# Patient Record
Sex: MALE | Race: WHITE | NOT HISPANIC OR LATINO | Employment: FULL TIME | URBAN - METROPOLITAN AREA
[De-identification: names, ages, dates, MRNs, and addresses within clinical notes are randomized per-mention and may not be internally consistent; named-entity substitution may affect disease eponyms.]

---

## 2017-08-31 ENCOUNTER — APPOINTMENT (OUTPATIENT)
Dept: GENERAL RADIOLOGY | Facility: HOSPITAL | Age: 27
End: 2017-08-31

## 2017-08-31 ENCOUNTER — APPOINTMENT (OUTPATIENT)
Dept: CT IMAGING | Facility: HOSPITAL | Age: 27
End: 2017-08-31

## 2017-08-31 ENCOUNTER — HOSPITAL ENCOUNTER (EMERGENCY)
Facility: HOSPITAL | Age: 27
Discharge: HOME | End: 2017-08-31
Attending: EMERGENCY MEDICINE | Admitting: EMERGENCY MEDICINE

## 2017-08-31 VITALS
DIASTOLIC BLOOD PRESSURE: 69 MMHG | TEMPERATURE: 97.1 F | SYSTOLIC BLOOD PRESSURE: 128 MMHG | WEIGHT: 247 LBS | HEIGHT: 65 IN | HEART RATE: 82 BPM | BODY MASS INDEX: 41.15 KG/M2 | RESPIRATION RATE: 16 BRPM | OXYGEN SATURATION: 96 %

## 2017-08-31 DIAGNOSIS — IMO0001 ELEVATED BLOOD PRESSURE: Primary | ICD-10-CM

## 2017-08-31 LAB
ALBUMIN SERPL-MCNC: 4.5 G/DL (ref 3.2–4.8)
ALBUMIN/GLOB SERPL: 1.4 G/DL (ref 1.5–2.5)
ALP SERPL-CCNC: 87 U/L (ref 25–100)
ALT SERPL W P-5'-P-CCNC: 39 U/L (ref 7–40)
ANION GAP SERPL CALCULATED.3IONS-SCNC: 6 MMOL/L (ref 3–11)
AST SERPL-CCNC: 27 U/L (ref 0–33)
BASOPHILS # BLD AUTO: 0.05 10*3/MM3 (ref 0–0.2)
BASOPHILS NFR BLD AUTO: 0.4 % (ref 0–1)
BILIRUB SERPL-MCNC: 0.4 MG/DL (ref 0.3–1.2)
BUN BLD-MCNC: 18 MG/DL (ref 9–23)
BUN/CREAT SERPL: 22.5 (ref 7–25)
CALCIUM SPEC-SCNC: 9.8 MG/DL (ref 8.7–10.4)
CHLORIDE SERPL-SCNC: 101 MMOL/L (ref 99–109)
CO2 SERPL-SCNC: 29 MMOL/L (ref 20–31)
CREAT BLD-MCNC: 0.8 MG/DL (ref 0.6–1.3)
DEPRECATED RDW RBC AUTO: 38.6 FL (ref 37–54)
EOSINOPHIL # BLD AUTO: 0.35 10*3/MM3 (ref 0–0.3)
EOSINOPHIL NFR BLD AUTO: 3 % (ref 0–3)
ERYTHROCYTE [DISTWIDTH] IN BLOOD BY AUTOMATED COUNT: 12.8 % (ref 11.3–14.5)
GFR SERPL CREATININE-BSD FRML MDRD: 118 ML/MIN/1.73
GLOBULIN UR ELPH-MCNC: 3.2 GM/DL
GLUCOSE BLD-MCNC: 95 MG/DL (ref 70–100)
HCT VFR BLD AUTO: 47.2 % (ref 38.9–50.9)
HGB BLD-MCNC: 16.1 G/DL (ref 13.1–17.5)
IMM GRANULOCYTES # BLD: 0.1 10*3/MM3 (ref 0–0.03)
IMM GRANULOCYTES NFR BLD: 0.9 % (ref 0–0.6)
LYMPHOCYTES # BLD AUTO: 2.69 10*3/MM3 (ref 0.6–4.8)
LYMPHOCYTES NFR BLD AUTO: 23.2 % (ref 24–44)
MCH RBC QN AUTO: 28 PG (ref 27–31)
MCHC RBC AUTO-ENTMCNC: 34.1 G/DL (ref 32–36)
MCV RBC AUTO: 82.2 FL (ref 80–99)
MONOCYTES # BLD AUTO: 0.73 10*3/MM3 (ref 0–1)
MONOCYTES NFR BLD AUTO: 6.3 % (ref 0–12)
NEUTROPHILS # BLD AUTO: 7.66 10*3/MM3 (ref 1.5–8.3)
NEUTROPHILS NFR BLD AUTO: 66.2 % (ref 41–71)
PLATELET # BLD AUTO: 177 10*3/MM3 (ref 150–450)
PMV BLD AUTO: 13.1 FL (ref 6–12)
POTASSIUM BLD-SCNC: 3.7 MMOL/L (ref 3.5–5.5)
PROT SERPL-MCNC: 7.7 G/DL (ref 5.7–8.2)
RBC # BLD AUTO: 5.74 10*6/MM3 (ref 4.2–5.76)
SODIUM BLD-SCNC: 136 MMOL/L (ref 132–146)
TROPONIN I SERPL-MCNC: 0 NG/ML (ref 0–0.07)
WBC NRBC COR # BLD: 11.58 10*3/MM3 (ref 3.5–10.8)

## 2017-08-31 PROCEDURE — 99284 EMERGENCY DEPT VISIT MOD MDM: CPT

## 2017-08-31 PROCEDURE — 36415 COLL VENOUS BLD VENIPUNCTURE: CPT

## 2017-08-31 PROCEDURE — 71020 HC CHEST PA AND LATERAL: CPT

## 2017-08-31 PROCEDURE — 80053 COMPREHEN METABOLIC PANEL: CPT | Performed by: PHYSICIAN ASSISTANT

## 2017-08-31 PROCEDURE — 85025 COMPLETE CBC W/AUTO DIFF WBC: CPT | Performed by: PHYSICIAN ASSISTANT

## 2017-08-31 PROCEDURE — 84484 ASSAY OF TROPONIN QUANT: CPT

## 2017-08-31 PROCEDURE — 93005 ELECTROCARDIOGRAM TRACING: CPT | Performed by: PHYSICIAN ASSISTANT

## 2017-08-31 PROCEDURE — 70450 CT HEAD/BRAIN W/O DYE: CPT

## 2017-08-31 RX ORDER — DULOXETIN HYDROCHLORIDE 60 MG/1
60 CAPSULE, DELAYED RELEASE ORAL DAILY
COMMUNITY
End: 2020-11-06

## 2017-08-31 RX ORDER — QUETIAPINE FUMARATE 200 MG/1
200 TABLET, FILM COATED ORAL NIGHTLY
COMMUNITY

## 2017-08-31 RX ORDER — ATENOLOL 25 MG/1
10 TABLET ORAL DAILY
COMMUNITY
End: 2020-11-06 | Stop reason: SDUPTHER

## 2020-11-06 ENCOUNTER — APPOINTMENT (OUTPATIENT)
Dept: GENERAL RADIOLOGY | Facility: HOSPITAL | Age: 30
End: 2020-11-06

## 2020-11-06 ENCOUNTER — HOSPITAL ENCOUNTER (EMERGENCY)
Facility: HOSPITAL | Age: 30
Discharge: HOME | End: 2020-11-06
Attending: EMERGENCY MEDICINE | Admitting: EMERGENCY MEDICINE

## 2020-11-06 VITALS
SYSTOLIC BLOOD PRESSURE: 152 MMHG | HEIGHT: 67 IN | OXYGEN SATURATION: 94 % | HEART RATE: 96 BPM | RESPIRATION RATE: 18 BRPM | DIASTOLIC BLOOD PRESSURE: 94 MMHG | WEIGHT: 270 LBS | BODY MASS INDEX: 42.38 KG/M2 | TEMPERATURE: 99.1 F

## 2020-11-06 DIAGNOSIS — I10 HYPERTENSION, UNSPECIFIED TYPE: ICD-10-CM

## 2020-11-06 DIAGNOSIS — R07.89 ATYPICAL CHEST PAIN: Primary | ICD-10-CM

## 2020-11-06 LAB
ALBUMIN SERPL-MCNC: 4.9 G/DL (ref 3.5–5.2)
ALBUMIN/GLOB SERPL: 1.8 G/DL
ALP SERPL-CCNC: 92 U/L (ref 39–117)
ALT SERPL W P-5'-P-CCNC: 41 U/L (ref 1–41)
ANION GAP SERPL CALCULATED.3IONS-SCNC: 11 MMOL/L (ref 5–15)
AST SERPL-CCNC: 30 U/L (ref 1–40)
BASOPHILS # BLD AUTO: 0.1 10*3/MM3 (ref 0–0.2)
BASOPHILS NFR BLD AUTO: 0.8 % (ref 0–1.5)
BILIRUB SERPL-MCNC: 0.4 MG/DL (ref 0–1.2)
BUN SERPL-MCNC: 21 MG/DL (ref 6–20)
BUN/CREAT SERPL: 25.9 (ref 7–25)
CALCIUM SPEC-SCNC: 10.1 MG/DL (ref 8.6–10.5)
CHLORIDE SERPL-SCNC: 105 MMOL/L (ref 98–107)
CO2 SERPL-SCNC: 25 MMOL/L (ref 22–29)
CREAT SERPL-MCNC: 0.81 MG/DL (ref 0.76–1.27)
D DIMER PPP FEU-MCNC: <0.27 MCGFEU/ML (ref 0–0.56)
DEPRECATED RDW RBC AUTO: 39.1 FL (ref 37–54)
EOSINOPHIL # BLD AUTO: 0.5 10*3/MM3 (ref 0–0.4)
EOSINOPHIL NFR BLD AUTO: 4 % (ref 0.3–6.2)
ERYTHROCYTE [DISTWIDTH] IN BLOOD BY AUTOMATED COUNT: 13.3 % (ref 12.3–15.4)
GFR SERPL CREATININE-BSD FRML MDRD: 113 ML/MIN/1.73
GLOBULIN UR ELPH-MCNC: 2.7 GM/DL
GLUCOSE SERPL-MCNC: 100 MG/DL (ref 65–99)
HCT VFR BLD AUTO: 50 % (ref 37.5–51)
HGB BLD-MCNC: 16.2 G/DL (ref 13–17.7)
HOLD SPECIMEN: NORMAL
HOLD SPECIMEN: NORMAL
IMM GRANULOCYTES # BLD AUTO: 0.15 10*3/MM3 (ref 0–0.05)
IMM GRANULOCYTES NFR BLD AUTO: 1.2 % (ref 0–0.5)
LYMPHOCYTES # BLD AUTO: 2.38 10*3/MM3 (ref 0.7–3.1)
LYMPHOCYTES NFR BLD AUTO: 19 % (ref 19.6–45.3)
MCH RBC QN AUTO: 26.9 PG (ref 26.6–33)
MCHC RBC AUTO-ENTMCNC: 32.4 G/DL (ref 31.5–35.7)
MCV RBC AUTO: 82.9 FL (ref 79–97)
MONOCYTES # BLD AUTO: 0.91 10*3/MM3 (ref 0.1–0.9)
MONOCYTES NFR BLD AUTO: 7.3 % (ref 5–12)
NEUTROPHILS NFR BLD AUTO: 67.7 % (ref 42.7–76)
NEUTROPHILS NFR BLD AUTO: 8.47 10*3/MM3 (ref 1.7–7)
NRBC BLD AUTO-RTO: 0 /100 WBC (ref 0–0.2)
PLATELET # BLD AUTO: 207 10*3/MM3 (ref 140–450)
PMV BLD AUTO: 12.3 FL (ref 6–12)
POTASSIUM SERPL-SCNC: 4.3 MMOL/L (ref 3.5–5.2)
PROT SERPL-MCNC: 7.6 G/DL (ref 6–8.5)
RBC # BLD AUTO: 6.03 10*6/MM3 (ref 4.14–5.8)
SODIUM SERPL-SCNC: 141 MMOL/L (ref 136–145)
TROPONIN T SERPL-MCNC: <0.01 NG/ML (ref 0–0.03)
TROPONIN T SERPL-MCNC: <0.01 NG/ML (ref 0–0.03)
WBC # BLD AUTO: 12.51 10*3/MM3 (ref 3.4–10.8)
WHOLE BLOOD HOLD SPECIMEN: NORMAL
WHOLE BLOOD HOLD SPECIMEN: NORMAL

## 2020-11-06 PROCEDURE — 71045 X-RAY EXAM CHEST 1 VIEW: CPT

## 2020-11-06 PROCEDURE — 85379 FIBRIN DEGRADATION QUANT: CPT | Performed by: PHYSICIAN ASSISTANT

## 2020-11-06 PROCEDURE — 99284 EMERGENCY DEPT VISIT MOD MDM: CPT

## 2020-11-06 PROCEDURE — 93005 ELECTROCARDIOGRAM TRACING: CPT | Performed by: PHYSICIAN ASSISTANT

## 2020-11-06 PROCEDURE — 85025 COMPLETE CBC W/AUTO DIFF WBC: CPT | Performed by: PHYSICIAN ASSISTANT

## 2020-11-06 PROCEDURE — 80053 COMPREHEN METABOLIC PANEL: CPT | Performed by: PHYSICIAN ASSISTANT

## 2020-11-06 PROCEDURE — 84484 ASSAY OF TROPONIN QUANT: CPT | Performed by: PHYSICIAN ASSISTANT

## 2020-11-06 PROCEDURE — 93005 ELECTROCARDIOGRAM TRACING: CPT | Performed by: EMERGENCY MEDICINE

## 2020-11-06 RX ORDER — LURASIDONE HYDROCHLORIDE 40 MG/1
40 TABLET, FILM COATED ORAL DAILY
COMMUNITY
End: 2022-03-15

## 2020-11-06 RX ORDER — ATENOLOL 25 MG/1
25 TABLET ORAL DAILY
Qty: 30 TABLET | Refills: 0 | Status: SHIPPED | OUTPATIENT
Start: 2020-11-06 | End: 2020-12-22 | Stop reason: SDUPTHER

## 2020-11-06 RX ORDER — SODIUM CHLORIDE 0.9 % (FLUSH) 0.9 %
10 SYRINGE (ML) INJECTION AS NEEDED
Status: DISCONTINUED | OUTPATIENT
Start: 2020-11-06 | End: 2020-11-06 | Stop reason: HOSPADM

## 2020-11-06 RX ORDER — ASPIRIN 81 MG/1
324 TABLET, CHEWABLE ORAL ONCE
Status: DISCONTINUED | OUTPATIENT
Start: 2020-11-06 | End: 2020-11-06

## 2020-11-06 NOTE — ED PROVIDER NOTES
Subjective   Mr. Grey is a 22-year-old male comes to the [facility] today complaining of having chest pain.  Patient reports about an hour before arriving here had a what felt like a muscle spasm the left side of his chest felt like a muscle spasm up and used had a lot of pain.  He did not radiate into his neck or down to his arm.  He denies any shortness of breath no nausea vomiting fevers chills.  States he is not lost smell or taste.  States he has no other complaints.  He had a previous stress test about 5 ago that was negative.  He has a history of hypertension, and a family history as his father had a heart attack in his 30s.  He does not smoke he does not drink does not use recreational drugs.      History provided by:  Patient   used: No    Chest Pain  Pain location:  L chest  Pain quality comment:  Spasm  Pain radiates to:  Does not radiate  Pain severity:  Severe  Onset quality:  Sudden  Timing:  Constant  Progression:  Resolved  Chronicity:  New  Context: not breathing, not drug use, not eating, not intercourse, not lifting, not movement, not raising an arm, not at rest, not stress and not trauma    Relieved by:  Nothing  Worsened by:  Nothing  Ineffective treatments:  None tried  Associated symptoms: no abdominal pain, no AICD problem, no anxiety, no back pain, no claudication, no cough, no diaphoresis, no dysphagia, no fatigue, no headache, no heartburn, no lower extremity edema, no nausea, no near-syncope, no orthopnea, no palpitations, no shortness of breath, no syncope, no vomiting and no weakness    Risk factors: hypertension and male sex    Risk factors: no aortic disease, no coronary artery disease, no diabetes mellitus, no Daily-Danlos syndrome, no immobilization, no Marfan's syndrome, not obese, no prior DVT/PE, no smoking and no surgery        Review of Systems   Constitutional: Negative for diaphoresis and fatigue.   HENT: Negative for trouble swallowing.    Respiratory:  Negative for cough and shortness of breath.    Cardiovascular: Positive for chest pain. Negative for palpitations, orthopnea, claudication, syncope and near-syncope.   Gastrointestinal: Negative for abdominal pain, heartburn, nausea and vomiting.   Musculoskeletal: Negative for back pain.   Neurological: Negative for weakness and headaches.   All other systems reviewed and are negative.      Past Medical History:   Diagnosis Date   • Bipolar 1 disorder (CMS/HCC)    • Hypertension    • Schizophrenia, schizo-affective (CMS/HCC)        Allergies   Allergen Reactions   • Albuterol Palpitations and Other (See Comments)       Past Surgical History:   Procedure Laterality Date   • EAR TUBES     • EXTERNAL FIXATION WRIST FRACTURE     • MOUTH SURGERY         History reviewed. No pertinent family history.    Social History     Socioeconomic History   • Marital status: Single     Spouse name: Not on file   • Number of children: Not on file   • Years of education: Not on file   • Highest education level: Not on file   Tobacco Use   • Smoking status: Never Smoker   • Smokeless tobacco: Never Used   Substance and Sexual Activity   • Alcohol use: No   • Drug use: No   • Sexual activity: Defer           Objective   Physical Exam  Vitals signs and nursing note reviewed.   Constitutional:       Appearance: He is well-developed.      Comments: Warm pink dry nontoxic afebrile   HENT:      Head: Normocephalic and atraumatic.      Right Ear: External ear normal.      Left Ear: External ear normal.      Nose: Nose normal.   Eyes:      General: No scleral icterus.     Conjunctiva/sclera: Conjunctivae normal.      Pupils: Pupils are equal, round, and reactive to light.   Neck:      Musculoskeletal: Normal range of motion.      Thyroid: No thyromegaly.   Cardiovascular:      Rate and Rhythm: Normal rate and regular rhythm.      Heart sounds: Normal heart sounds.      Comments: Chest wall is tender to palpation there is no rash no lesions no  crepitus.  Pulmonary:      Effort: Pulmonary effort is normal. No respiratory distress.      Breath sounds: Normal breath sounds. No wheezing or rales.   Chest:      Chest wall: No tenderness.   Abdominal:      General: Bowel sounds are normal. There is no distension.      Palpations: Abdomen is soft.      Tenderness: There is no abdominal tenderness.   Musculoskeletal: Normal range of motion.   Lymphadenopathy:      Cervical: No cervical adenopathy.   Skin:     General: Skin is warm and dry.   Neurological:      Mental Status: He is alert and oriented to person, place, and time.      Cranial Nerves: No cranial nerve deficit.      Coordination: Coordination normal.      Deep Tendon Reflexes: Reflexes are normal and symmetric. Reflexes normal.   Psychiatric:         Behavior: Behavior normal.         Thought Content: Thought content normal.         Judgment: Judgment normal.         Procedures           ED Course                                   Recent Results (from the past 24 hour(s))   Troponin    Collection Time: 11/06/20  2:37 PM    Specimen: Blood   Result Value Ref Range    Troponin T <0.010 0.000 - 0.030 ng/mL     Note: In addition to lab results from this visit, the labs listed above may include labs taken at another facility or during a different encounter within the last 24 hours. Please correlate lab times with ED admission and discharge times for further clarification of the services performed during this visit.    XR Chest 1 View   Final Result   No acute cardiopulmonary disease.       D:  11/06/2020   E:  11/06/2020       This report was finalized on 11/6/2020 3:18 PM by Dr. Rohini King MD.            Vitals:    11/06/20 1330 11/06/20 1400 11/06/20 1415 11/06/20 1517   BP: 160/92 152/94     BP Location:       Patient Position:       Pulse: 102 105 103 96   Resp:       Temp:       TempSrc:       SpO2: 93% 95% 95% 94%   Weight:       Height:         Medications - No data to display  ECG/EMG  Results (last 24 hours)     Procedure Component Value Units Date/Time    ECG 12 Lead [[id number]] Collected: 11/06/20 1209     Updated: 11/06/20 1232    ECG 12 Lead [[id number]] Collected: 11/06/20 1422     Updated: 11/06/20 1430        ECG 12 Lead         ECG 12 Lead         ECG 12 Lead    (Results Pending)     Discussed the patient's findings.  Also discussed the follow-up and likely this is costochondritis with his pain being reproducible with palpation.  We discussed his laboratory data as well as x-rays.  He verbalized understanding of these.  Recent Results (from the past 24 hour(s))   Troponin    Collection Time: 11/06/20  2:37 PM    Specimen: Blood   Result Value Ref Range    Troponin T <0.010 0.000 - 0.030 ng/mL     Note: In addition to lab results from this visit, the labs listed above may include labs taken at another facility or during a different encounter within the last 24 hours. Please correlate lab times with ED admission and discharge times for further clarification of the services performed during this visit.    XR Chest 1 View   Final Result   No acute cardiopulmonary disease.       D:  11/06/2020   E:  11/06/2020       This report was finalized on 11/6/2020 3:18 PM by Dr. Rohini King MD.            Vitals:    11/06/20 1330 11/06/20 1400 11/06/20 1415 11/06/20 1517   BP: 160/92 152/94     BP Location:       Patient Position:       Pulse: 102 105 103 96   Resp:       Temp:       TempSrc:       SpO2: 93% 95% 95% 94%   Weight:       Height:         Medications - No data to display  ECG/EMG Results (last 24 hours)     Procedure Component Value Units Date/Time    ECG 12 Lead [[id number]] Collected: 11/06/20 1209     Updated: 11/06/20 1232    ECG 12 Lead [[id number]] Collected: 11/06/20 1422     Updated: 11/06/20 1430        ECG 12 Lead         ECG 12 Lead         ECG 12 Lead    (Results Pending)               HEART Score (for prediction of 6-week risk of major adverse cardiac event) reviewed  and/or performed as part of the patient evaluation and treatment planning process.  The result associated with this review/performance is: 1       MDM  Number of Diagnoses or Management Options  Atypical chest pain: new and requires workup  Hypertension, unspecified type: new and requires workup     Amount and/or Complexity of Data Reviewed  Clinical lab tests: reviewed and ordered  Tests in the radiology section of CPT®: reviewed and ordered  Tests in the medicine section of CPT®: ordered and reviewed  Discuss the patient with other providers: yes    Patient Progress  Patient progress: stable      Final diagnoses:   Atypical chest pain   Hypertension, unspecified type            Ant Byers PA  11/07/20 1110       Ant Byers PA  11/07/20 1221

## 2020-11-09 LAB
QT INTERVAL: 366 MS
QT INTERVAL: 380 MS
QTC INTERVAL: 469 MS
QTC INTERVAL: 482 MS

## 2020-11-10 ENCOUNTER — OFFICE VISIT (OUTPATIENT)
Dept: CARDIOLOGY | Facility: HOSPITAL | Age: 30
End: 2020-11-10

## 2020-11-10 VITALS
WEIGHT: 275.5 LBS | BODY MASS INDEX: 43.24 KG/M2 | DIASTOLIC BLOOD PRESSURE: 75 MMHG | HEART RATE: 82 BPM | TEMPERATURE: 98.2 F | RESPIRATION RATE: 22 BRPM | OXYGEN SATURATION: 97 % | HEIGHT: 67 IN | SYSTOLIC BLOOD PRESSURE: 127 MMHG

## 2020-11-10 DIAGNOSIS — R07.89 CHEST PAIN, ATYPICAL: Primary | ICD-10-CM

## 2020-11-10 DIAGNOSIS — I10 ESSENTIAL HYPERTENSION: ICD-10-CM

## 2020-11-10 DIAGNOSIS — Z82.49 FAMILY HISTORY OF PREMATURE CAD: ICD-10-CM

## 2020-11-10 DIAGNOSIS — R00.2 PALPITATIONS: ICD-10-CM

## 2020-11-10 PROCEDURE — 99214 OFFICE O/P EST MOD 30 MIN: CPT | Performed by: NURSE PRACTITIONER

## 2020-11-10 RX ORDER — ATOMOXETINE 80 MG/1
80 CAPSULE ORAL DAILY
COMMUNITY
Start: 2020-11-09

## 2020-11-10 NOTE — PROGRESS NOTES
[facility]  [facility]      Encounter Date:11/10/2020     Gregoriobeto ALBA Que  [address] [place] [address]  [unfilled]    1990    Sam Shepard MD    Stef Grey is a 30 y.o. male.      Subjective:     Chief Complaint:  Chest Pain (ED follow up.)       HPI     30-year-old male presented to [facility] ED on 11/6/2020 with complaints of left-sided chest pain.  At times can radiate to left arm.  Can occur 1-2 times per week.  Noted at rest and exertion.  Duration, seconds to minutes.    Mild worsening dyspnea  Occasional palpitations.  Worse  over the last 3 months.   Denies nausea, vomiting, diarrhea, abdominal pain, diaphoresis.  Denies  near syncope, syncope, palpitations.  Patient had a previous stress test reported several years ago ([facility]).  Hx of a heart murmur.  History of hypertension.  Reports history of father having a heart attack in his 30s.    There are no active problems to display for this patient.          Past Surgical History:   Procedure Laterality Date   • EAR TUBES     • EXTERNAL FIXATION WRIST FRACTURE      x2   • MOUTH SURGERY         Allergies   Allergen Reactions   • Albuterol Palpitations and Other (See Comments)         Current Outpatient Medications:   •  Acetaminophen 325 MG capsule, Take 650 mg by mouth As Needed., Disp: , Rfl:   •  atenolol (TENORMIN) 25 MG tablet, Take 1 tablet by mouth Daily., Disp: 30 tablet, Rfl: 0  •  atomoxetine (STRATTERA) 80 MG capsule, Take 80 mg by mouth Daily., Disp: , Rfl:   •  lurasidone (LATUDA) 40 MG tablet tablet, Take 40 mg by mouth Daily., Disp: , Rfl:   •  QUEtiapine (SEROQUEL) 200 MG tablet, Take 200 mg by mouth Every Night., Disp: , Rfl:     The following portions of the patient's history were reviewed and updated today during office visit as appropriate: allergies, current medications, past family history, past medical history, past social history, past surgical history and  "problem list.    Review of Systems   Cardiovascular: Positive for chest pain and palpitations.   Respiratory: Positive for shortness of breath.    All other systems reviewed and are negative.      Objective:     Vitals:    11/10/20 1402 11/10/20 1407 11/10/20 1408   BP: 130/72 132/79 127/75   BP Location: Right arm Left arm Left arm   Patient Position: Sitting Standing Sitting   Cuff Size: Large Adult Large Adult Large Adult   Pulse: 82 87 82   Resp:   22   Temp:   98.2 °F (36.8 °C)   TempSrc:   Temporal   SpO2: 97% 97% 97%   Weight:   125 kg (275 lb 8 oz)   Height:   170.2 cm (67\")     Body mass index is 43.15 kg/m².      Vitals signs reviewed.   Constitutional:       General: Not in acute distress.     Appearance: Well-developed and not in distress. Morbidly obese.   Eyes:      General: No scleral icterus.     Conjunctiva/sclera: Conjunctivae normal.   HENT:      Head: Normocephalic and atraumatic.   Neck:      Thyroid: No thyromegaly.      Vascular: No carotid bruit, hepatojugular reflux or JVD.      Trachea: No tracheal deviation.      Lymphadenopathy: No cervical adenopathy.   Pulmonary:      Effort: Pulmonary effort is normal.      Breath sounds: Normal breath sounds.   Cardiovascular:      Normal rate. Regular rhythm.   Pulses:     Intact distal pulses.   Edema:     Peripheral edema absent.   Abdominal:      General: Bowel sounds are normal. There is no distension.      Palpations: Abdomen is soft.      Tenderness: There is no abdominal tenderness.   Skin:     General: Skin is warm and dry. There is no cyanosis.      Coloration: Skin is not pale.      Findings: No erythema or rash.   Neurological:      Mental Status: Alert, oriented to person, place, and time and oriented to person, place and time.   Psychiatric:         Behavior: Behavior normal.         Thought Content: Thought content normal.         Lab and Diagnostic Review:  Lab Results   Component Value Date    WBC 12.51 (H) 11/06/2020    HGB 16.2 " 11/06/2020    HCT 50.0 11/06/2020    MCV 82.9 11/06/2020     11/06/2020     Lab Results   Component Value Date    GLUCOSE 100 (H) 11/06/2020    CALCIUM 10.1 11/06/2020     11/06/2020    K 4.3 11/06/2020    CO2 25.0 11/06/2020     11/06/2020    BUN 21 (H) 11/06/2020    CREATININE 0.81 11/06/2020    EGFRIFNONA 113 11/06/2020    BCR 25.9 (H) 11/06/2020    ANIONGAP 11.0 11/06/2020       Assessment and Plan:         1. Chest pain, atypical  Obesity, HTN, family hx of premature CAD    - Treadmill Stress Test; Future    2. Palpitations  May need holter if s/s continue  Currently on BB    3.  Family hx of premature CAD.   Reported father having MI 30's.     4.  HTN  On Attenolol    F/u 6 weeks or sooner if needed.        *Please note that portions of this note were completed with a voice recognition program. Efforts were made to edit the dictations, but occasionally words are mistranscribed.

## 2020-11-13 ENCOUNTER — APPOINTMENT (OUTPATIENT)
Dept: PREADMISSION TESTING | Facility: HOSPITAL | Age: 30
End: 2020-11-13

## 2020-11-13 PROCEDURE — C9803 HOPD COVID-19 SPEC COLLECT: HCPCS

## 2020-11-13 PROCEDURE — U0004 COV-19 TEST NON-CDC HGH THRU: HCPCS

## 2020-11-14 LAB — SARS-COV-2 RNA RESP QL NAA+PROBE: NOT DETECTED

## 2020-11-16 ENCOUNTER — HOSPITAL ENCOUNTER (OUTPATIENT)
Dept: CARDIOLOGY | Facility: HOSPITAL | Age: 30
Discharge: HOME | End: 2020-11-16
Admitting: NURSE PRACTITIONER

## 2020-11-16 VITALS
BODY MASS INDEX: 43.16 KG/M2 | HEART RATE: 90 BPM | DIASTOLIC BLOOD PRESSURE: 82 MMHG | HEIGHT: 67 IN | OXYGEN SATURATION: 97 % | SYSTOLIC BLOOD PRESSURE: 138 MMHG | WEIGHT: 275 LBS

## 2020-11-16 DIAGNOSIS — R07.89 CHEST PAIN, ATYPICAL: ICD-10-CM

## 2020-11-16 LAB
BH CV STRESS BP STAGE 1: NORMAL
BH CV STRESS BP STAGE 2: NORMAL
BH CV STRESS DURATION MIN STAGE 1: 3
BH CV STRESS DURATION MIN STAGE 2: 3
BH CV STRESS DURATION SEC STAGE 1: 0
BH CV STRESS DURATION SEC STAGE 2: 0
BH CV STRESS DURATION SEC STAGE 3: 22
BH CV STRESS GRADE STAGE 1: 10
BH CV STRESS GRADE STAGE 2: 12
BH CV STRESS GRADE STAGE 3: 14
BH CV STRESS HR STAGE 1: 136
BH CV STRESS HR STAGE 2: 166
BH CV STRESS HR STAGE 3: 166
BH CV STRESS METS STAGE 1: 5
BH CV STRESS METS STAGE 2: 7.5
BH CV STRESS METS STAGE 3: 10
BH CV STRESS O2 STAGE 1: 96
BH CV STRESS O2 STAGE 2: 96
BH CV STRESS O2 STAGE 3: 97
BH CV STRESS PROTOCOL 1: NORMAL
BH CV STRESS RECOVERY BP: NORMAL MMHG
BH CV STRESS RECOVERY HR: 107 BPM
BH CV STRESS RECOVERY O2: 97 %
BH CV STRESS SPEED STAGE 1: 1.7
BH CV STRESS SPEED STAGE 2: 2.5
BH CV STRESS SPEED STAGE 3: 3.4
BH CV STRESS STAGE 1: 1
BH CV STRESS STAGE 2: 2
BH CV STRESS STAGE 3: 3
MAXIMAL PREDICTED HEART RATE: 192 BPM
PERCENT MAX PREDICTED HR: 98.96 %
STRESS BASELINE BP: NORMAL MMHG
STRESS BASELINE HR: 90 BPM
STRESS O2 SAT REST: 97 %
STRESS PERCENT HR: 116 %
STRESS POST EXERCISE DUR MIN: 6 MIN
STRESS POST EXERCISE DUR SEC: 22 SEC
STRESS POST O2 SAT PEAK: 97 %
STRESS POST PEAK BP: NORMAL MMHG
STRESS POST PEAK HR: 190 BPM
STRESS TARGET HR: 163 BPM

## 2020-11-16 PROCEDURE — 93018 CV STRESS TEST I&R ONLY: CPT | Performed by: INTERNAL MEDICINE

## 2020-11-16 PROCEDURE — 93017 CV STRESS TEST TRACING ONLY: CPT

## 2020-11-17 ENCOUNTER — TELEPHONE (OUTPATIENT)
Dept: CARDIOLOGY | Facility: HOSPITAL | Age: 30
End: 2020-11-17

## 2020-11-17 NOTE — TELEPHONE ENCOUNTER
Attempted to call patient to review treadmill stress test results.Unable to reach patient. Will try at a later time.

## 2020-11-18 ENCOUNTER — TELEPHONE (OUTPATIENT)
Dept: CARDIOLOGY | Facility: HOSPITAL | Age: 30
End: 2020-11-18

## 2020-11-18 NOTE — TELEPHONE ENCOUNTER
Reviewed stress test with patient. Keep scheduled appointment in [facility] December 2020.

## 2020-11-19 PROBLEM — R07.9 CHEST PAIN: Status: ACTIVE | Noted: 2020-11-19

## 2020-12-22 ENCOUNTER — TELEMEDICINE (OUTPATIENT)
Dept: CARDIOLOGY | Facility: HOSPITAL | Age: 30
End: 2020-12-22

## 2020-12-22 VITALS
SYSTOLIC BLOOD PRESSURE: 133 MMHG | BODY MASS INDEX: 42.38 KG/M2 | HEART RATE: 88 BPM | DIASTOLIC BLOOD PRESSURE: 97 MMHG | HEIGHT: 67 IN | WEIGHT: 270 LBS

## 2020-12-22 DIAGNOSIS — R07.89 CHEST PAIN, ATYPICAL: Primary | ICD-10-CM

## 2020-12-22 DIAGNOSIS — I10 ESSENTIAL HYPERTENSION: ICD-10-CM

## 2020-12-22 DIAGNOSIS — R00.2 PALPITATIONS: ICD-10-CM

## 2020-12-22 PROCEDURE — 99214 OFFICE O/P EST MOD 30 MIN: CPT | Performed by: NURSE PRACTITIONER

## 2020-12-22 RX ORDER — ATENOLOL 25 MG/1
25 TABLET ORAL 2 TIMES DAILY
Qty: 60 TABLET | Refills: 3 | Status: SHIPPED | OUTPATIENT
Start: 2020-12-22 | End: 2021-03-31 | Stop reason: SDUPTHER

## 2020-12-22 NOTE — PROGRESS NOTES
[facility]  Heart and Valve Center      Encounter Date:12/22/2020     Stef Grey  [address] [place] [address]  [unfilled]    1990    Sam Shepard MD    Stef Grey is a 30 y.o. male.    This was an audio and video enabled telemedicine encounter.    Subjective:     Chief Complaint:  Chest Pain, Palpitations, and Follow-up       HPI     30-year-old male follow-up on chest discomfort and palpitations.  Treadmill stress test completed on 11/16/2020: No evidence of ischemia.  History of obesity, hypertension, family history of premature CAD.  Reported father having MI in his 30s.  Hypertension managed with atenolol.    Pt reports chest discomfort intermittent, but improved on atenolol.  NO dyspnea, dizziness, syncope, edema.  NO fatigue on BB.  Home log 130-140/90's, no HA, vision changes, TIA/CVA like s/s.     Patient Active Problem List    Diagnosis Date Noted   • Chest pain 11/19/2020     Note Last Updated: 11/19/2020     · Treadmill stress test 11/16/2020: No evidence of ischemia.  Significant exercise impairment.           Past Surgical History:   Procedure Laterality Date   • EAR TUBES     • EXTERNAL FIXATION WRIST FRACTURE      x2   • MOUTH SURGERY         Allergies   Allergen Reactions   • Albuterol Palpitations and Other (See Comments)         Current Outpatient Medications:   •  Acetaminophen 325 MG capsule, Take 650 mg by mouth As Needed., Disp: , Rfl:   •  atenolol (TENORMIN) 25 MG tablet, Take 1 tablet by mouth 2 (two) times a day., Disp: 60 tablet, Rfl: 3  •  atomoxetine (STRATTERA) 80 MG capsule, Take 80 mg by mouth Daily., Disp: , Rfl:   •  QUEtiapine (SEROQUEL) 200 MG tablet, Take 200 mg by mouth Every Night., Disp: , Rfl:   •  lurasidone (LATUDA) 40 MG tablet tablet, Take 40 mg by mouth Daily., Disp: , Rfl:     The following portions of the patient's history were reviewed and updated today during office visit as appropriate:  "allergies, current medications, past family history, past medical history, past social history, past surgical history and problem list.    Review of Systems   Cardiovascular: Positive for chest pain and palpitations.   Respiratory: Positive for shortness of breath.    All other systems reviewed and are negative.      Objective:     Vitals:    12/22/20 0805   BP: 133/97   BP Location: Left arm   Patient Position: Sitting   Cuff Size: Adult   Pulse: 88   Weight: 122 kg (270 lb)   Height: 170.2 cm (67\")       Body mass index is 42.29 kg/m².      Vitals signs reviewed.   Constitutional:       Appearance: Not in distress. Morbidly obese.   Pulmonary:      Effort: Pulmonary effort is normal.   Skin:     Coloration: Skin is not pale.   Neurological:      Mental Status: Alert and oriented to person, place and time.   Psychiatric:         Behavior: Behavior is cooperative.         Lab and Diagnostic Review:    Treadmill stress test 11/16/2020: No evidence of ischemia.  Decreased exercise impairment.  No arrhythmias.  Assessment and Plan:         1. Chest pain, atypical  Improved on BB    Acceptable treadmill stress    Increase physical activity and modify diet for weight loss    2. Palpitations  ocassional  No arrythmias on stress  Improved on BB    3. Essential hypertension  Increase atenolol 25 mg BID    Fu 6 weeks, telehealth visit.             *Please note that portions of this note were completed with a voice recognition program. Efforts were made to edit the dictations, but occasionally words are mistranscribed.    "

## 2021-04-01 RX ORDER — ATENOLOL 25 MG/1
25 TABLET ORAL 2 TIMES DAILY
Qty: 60 TABLET | Refills: 3 | Status: SHIPPED | OUTPATIENT
Start: 2021-04-01 | End: 2021-12-08 | Stop reason: SDUPTHER

## 2021-04-01 NOTE — TELEPHONE ENCOUNTER
Pt will need to scheduled a f/u in [facility] for continued management and refills or f/u with PCP

## 2021-12-08 RX ORDER — ATENOLOL 25 MG/1
25 TABLET ORAL 2 TIMES DAILY
Qty: 60 TABLET | Refills: 0 | Status: SHIPPED | OUTPATIENT
Start: 2021-12-08 | End: 2021-12-23

## 2021-12-08 NOTE — TELEPHONE ENCOUNTER
I have given 1 month supply.  Pt will need a f/u office visit in [facility] for continued refills or he may choose to see his PCP for continued management.

## 2021-12-23 RX ORDER — ATENOLOL 25 MG/1
TABLET ORAL
Qty: 60 TABLET | Refills: 0 | Status: SHIPPED | OUTPATIENT
Start: 2021-12-23 | End: 2022-03-15 | Stop reason: SDUPTHER

## 2022-01-28 RX ORDER — ATENOLOL 25 MG/1
25 TABLET ORAL 2 TIMES DAILY
Qty: 60 TABLET | Refills: 0 | OUTPATIENT
Start: 2022-01-28

## 2022-02-24 RX ORDER — ATENOLOL 25 MG/1
TABLET ORAL
Qty: 60 TABLET | Refills: 0 | OUTPATIENT
Start: 2022-02-24

## 2022-02-24 RX ORDER — ATENOLOL 25 MG/1
25 TABLET ORAL 2 TIMES DAILY
Qty: 60 TABLET | Refills: 0 | OUTPATIENT
Start: 2022-02-24

## 2022-03-15 ENCOUNTER — OFFICE VISIT (OUTPATIENT)
Dept: CARDIOLOGY | Facility: HOSPITAL | Age: 32
End: 2022-03-15

## 2022-03-15 ENCOUNTER — HOSPITAL ENCOUNTER (OUTPATIENT)
Dept: CARDIOLOGY | Facility: HOSPITAL | Age: 32
Discharge: HOME | End: 2022-03-15

## 2022-03-15 VITALS
WEIGHT: 270.13 LBS | RESPIRATION RATE: 19 BRPM | OXYGEN SATURATION: 94 % | HEIGHT: 65 IN | HEART RATE: 77 BPM | DIASTOLIC BLOOD PRESSURE: 70 MMHG | SYSTOLIC BLOOD PRESSURE: 125 MMHG | TEMPERATURE: 98.3 F | BODY MASS INDEX: 45.01 KG/M2

## 2022-03-15 DIAGNOSIS — I10 ESSENTIAL HYPERTENSION: ICD-10-CM

## 2022-03-15 DIAGNOSIS — R00.2 PALPITATIONS: ICD-10-CM

## 2022-03-15 DIAGNOSIS — R07.89 CHEST PRESSURE: ICD-10-CM

## 2022-03-15 DIAGNOSIS — R06.83 SNORING: ICD-10-CM

## 2022-03-15 PROCEDURE — 99214 OFFICE O/P EST MOD 30 MIN: CPT | Performed by: NURSE PRACTITIONER

## 2022-03-15 PROCEDURE — 93246 EXT ECG>7D<15D RECORDING: CPT

## 2022-03-15 RX ORDER — ATENOLOL 25 MG/1
25 TABLET ORAL 2 TIMES DAILY
Qty: 60 TABLET | Refills: 6 | Status: SHIPPED | OUTPATIENT
Start: 2022-03-15

## 2022-03-15 NOTE — PROGRESS NOTES
"[facility] Heart [facility] Vascular    Chief Complaint  Hypertension    Subjective    History of Present Illness {CC  Problem List  Visit  Diagnosis   Encounters  Notes  Medications  Labs  Result Review Imaging  Media :23}     Stef Grey presents to [facility] CARDIOLOGY for   History of Present Illness       32-year-old male with history of palpitations, chest discomfort.  Treadmill stress test completed 11/16/2020: No ischemia.  History of obesity, hypertension, family history of premature CAD reporting father having MIs in his 30s.    Last seen in the heart valve center on 12/22/2020.  At that time atenolol has been increased to twice a day dosing.  Patient's palpitations and blood pressure had been better controlled.  Patient presents today for follow-up of blood pressure.    Pt reports waking from sleep with chest pain.  Pt went to ED ([facility]).  Patient reports CP improved with NTG.  BP was elevated which he reports improved.  NO further CP.  NO change in dyspnea, dizziness, syncope.  Pt reports mild intermittent palpitations.      Reports a hx of snoring.  Daytime fatigue felt related to Seroquel.      No acid reflux.        Objective     Vital Signs:   Vitals:    03/15/22 1224   BP: 125/70   BP Location: Left arm   Patient Position: Sitting   Cuff Size: Adult   Pulse: 77   Resp: 19   Temp: 98.3 °F (36.8 °C)   TempSrc: Temporal   SpO2: 94%   Weight: 123 kg (270 lb 2 oz)   Height: 165.1 cm (65\")     Body mass index is 44.95 kg/m².  Physical Exam  Vitals reviewed.   Constitutional:       General: He is not in acute distress.     Appearance: Normal appearance. He is morbidly obese.   Cardiovascular:      Rate and Rhythm: Normal rate and regular rhythm.      Pulses:           Radial pulses are 2+ on the right side.        Dorsalis pedis pulses are 2+ on the right side.        Posterior tibial pulses are 2+ on the right side.      Heart sounds: " Normal heart sounds.   Pulmonary:      Effort: Pulmonary effort is normal.      Breath sounds: Normal breath sounds.   Abdominal:      Palpations: Abdomen is soft.      Tenderness: There is no abdominal tenderness.   Musculoskeletal:      Right lower leg: No edema.      Left lower leg: No edema.   Skin:     General: Skin is warm and dry.   Neurological:      Mental Status: He is alert.   Psychiatric:         Mood and Affect: Mood normal.         Behavior: Behavior is cooperative.              Result Review  Data Reviewed:{ Labs  Result Review  Imaging  Med Tab  Media :23}   · Treadmill stress test 11/16/2020: No evidence of ischemia.  Significant exercise impairment.      Assessment and Plan {CC Problem List  Visit Diagnosis  ROS  Review (Popup)  Health Maintenance  Quality  BestPractice  Medications  SmartSets  SnapShot Encounters  Media :23}   1. Essential hypertension  Controlled today  Refill:  - atenolol (TENORMIN) 25 MG tablet; Take 1 tablet by mouth 2 (Two) Times a Day.  Dispense: 60 tablet; Refill: 6    2. Palpitations    - atenolol (TENORMIN) 25 MG tablet; Take 1 tablet by mouth 2 (Two) Times a Day.  Dispense: 60 tablet; Refill: 6  - Holter Monitor - 72 Hour Up To 15 Days; Future  - Ambulatory Referral to Sleep Medicine    3. Snoring  Body mass index is 44.95 kg/m².    - Ambulatory Referral to Sleep Medicine    4. Chest pressure  X1 that woke patient from sleep  Pt at risk for IFEANYI to be scheduled for sleep study  Pt denies exertional CP or pressure  Normal stress 2020  If s/s continue will consider repeat stress or coronary artery CTA.           Follow Up {Instructions Charge Capture  Follow-up Communications :23}   Return in about 6 weeks (around 4/26/2022) for Office visit, monitor results, HTN.    Patient was given instructions and counseling regarding his condition or for health maintenance advice. Please see specific information pulled into the AVS if appropriate.  Patient was  instructed to call the [facility] with any questions, concerns, or worsening symptoms.

## 2022-03-15 NOTE — PROGRESS NOTES
[facility] Heart Monitor Documentation    Stef Grey  1990  [id number]  03/15/22      [] ZIO XT Patch  Model [id number] Prescribed for N/A Days    · Serial Number: (N + 9 Digits) N   · Apply-By Date on Box:   · USPS Tracking Number:   · USPS Tracking        [] [employer] MINI PLUS Mobile Cardiac Telemetry  Model BGMINIPLUS Prescribed for N/A Days    · Serial Number: (BGM + 7 Digits) BGM  · Shipped-By Date on Box:   · UPS Tracking Number: [id number]  · UPS Tracking      [] [employer] MINI Holter Monitor  Model BGMINIEL Prescribed for 14 Days    · Serial Number: (7 Digits) [id number]  · Shipped-By Date on Box: [id number]  · UPS Tracking Number: [id number]  · UPS Tracking        This monitor was applied to the patient's chest and checked for proper functioning.  Mr. Stef Grey was instructed in the proper use of this monitor.  He was given the opportunity to ask questions and left the office with the device 's instruction manual.    Judit Mcdaniel MA, 13:19 EDT, 03/15/22                  [facility] 8.8.2019

## 2022-05-04 ENCOUNTER — TELEMEDICINE (OUTPATIENT)
Dept: CARDIOLOGY | Facility: HOSPITAL | Age: 32
End: 2022-05-04

## 2022-05-04 VITALS
DIASTOLIC BLOOD PRESSURE: 70 MMHG | WEIGHT: 272 LBS | HEIGHT: 65 IN | HEART RATE: 89 BPM | SYSTOLIC BLOOD PRESSURE: 125 MMHG | BODY MASS INDEX: 45.32 KG/M2

## 2022-05-04 DIAGNOSIS — R07.89 CHEST PAIN, ATYPICAL: ICD-10-CM

## 2022-05-04 DIAGNOSIS — R00.2 PALPITATIONS: Primary | ICD-10-CM

## 2022-05-04 DIAGNOSIS — I10 ESSENTIAL HYPERTENSION: ICD-10-CM

## 2022-05-04 DIAGNOSIS — E66.01 MORBID OBESITY WITH BMI OF 40.0-44.9, ADULT: ICD-10-CM

## 2022-05-04 DIAGNOSIS — R06.83 SNORING: ICD-10-CM

## 2022-05-04 PROCEDURE — 99213 OFFICE O/P EST LOW 20 MIN: CPT | Performed by: NURSE PRACTITIONER

## 2022-05-04 NOTE — PROGRESS NOTES
[facility], [facility] Heart and Vascular    This was an audio and video enabled telemedicine encounter.    You have chosen to receive care through the use of telemedicine. Telemedicine enables health care providers at different locations to provide safe, effective, and convenient care through the use of technology. As with any health care service, there are risks associated with the use of telemedicine, including equipment failure, poor connections, and  issues.    • Do you understand the risks and benefits of telemedicine as I have explained them to you? Yes  • Have your questions regarding telemedicine been answered? Yes  • Trouble with heartburn or indigestion do you consent to the use of telemedicine in your medical care today? Yes    Chief Complaint  No chief complaint on file.    Subjective    History of Present Illness {CC  Problem List  Visit  Diagnosis   Encounters  Notes  Medications  Labs  Result Review Imaging  Media :23}     Stef Grey presents to [facility] CARDIOLOGY for   History of Present Illness     32-year-old male with history of palpitation, chest discomfort.  History of morbid obesity, hypertension, family history of premature CAD reporting father had MIs in his 30s    History of treadmill stress test 11/16/2020: No ischemia.    Patient with palpitations.  Currently on atenolol.    Patient with history of snoring.  Had awakened at night with chest pressure while sleeping.  Referred for sleep medicine.       Had discussed his symptoms continued with normal stress test in 2020 to consider coronary artery CTA or repeat stress.    Extended Holter 3/15/2022: Duration 13 days.  Analyzable data 9 days.  Average heart rate 89 bpm, PACs and PVCs less than 1%.  Patient symptoms associated with sinus tachycardia and sinus.  Heart rate controlled 80%, 20% tachycardia.  No concerning arrhythmias.    No further CP or  "pressure having any unusual shortness of breath.  No dizziness, near syncope, syncope.  Occassional palpitations.      Pt reports he is still interested in sleep study but having difficulty contacting office to scheduled.       Objective     Vital Signs:   Vitals:    05/04/22 0939   BP: 125/70   Pulse: 89   Weight: 123 kg (272 lb)   Height: 165.1 cm (65\")     Body mass index is 45.26 kg/m².  Physical Exam  Vitals reviewed.   Constitutional:       General: He is not in acute distress.     Appearance: He is morbidly obese.   Pulmonary:      Effort: Pulmonary effort is normal.   Skin:     Coloration: Skin is not pale.   Neurological:      Mental Status: He is alert.   Psychiatric:         Mood and Affect: Mood normal.         Behavior: Behavior normal. Behavior is cooperative.              Result Review  Data Reviewed:{ Labs  Result Review  Imaging  Med Tab  Media :23}   · Treadmill stress test 11/16/2020: No evidence of ischemia.  Significant exercise impairment.    Extended Holter 3/15/2022: Duration 13 days.  Analyzable data 9 days.  Average heart rate 89 bpm, PACs and PVCs less than 1%.  Patient symptoms associated with sinus tachycardia and sinus.  Heart rate controlled 80%, 20% tachycardia.  No concerning arrhythmias.      Assessment and Plan {CC Problem List  Visit Diagnosis  ROS  Review (Popup)  Health Maintenance  Quality  BestPractice  Medications  SmartSets  SnapShot Encounters  Media :23}   1. Palpitations  Heart monitor acceptable.  No concerning arrhythmias.  Continue atenolol      2. Essential hypertension  Blood pressure has been well controlled on atenolol.  Continue to monitor    3. Chest pain, atypical  Normal stress in 2020.  No further chest discomfort.  Family history of premature CAD.  Continue to monitor at this time.  If symptoms worsen or continue we will discussed repeat stress test versus coronary artery CTA.    4. Snoring  Patient still interested in sleep study.  Unable to " "make contact with sleep center to schedule.  \"Missing calls\"  Heart valve  will attempt to assist patient with scheduling sleep study.  5. Morbid obesity with BMI of 40.0-44.9, adult (HCC)  Body mass index is 45.26 kg/m².  Diet and exercise modifications for weight loss.      I spent 20 minutes caring for Stef on this date of service. This time includes time spent by me in the following activities:preparing for the visit, reviewing tests, performing a medically appropriate examination and/or evaluation , counseling and educating the patient/family/caregiver, documenting information in the medical record and care coordination    Follow Up {Instructions Charge Capture  Follow-up Communications :23}   Return in about 3 months (around 8/4/2022) for Video visit, palpitations, Chest pain.    Patient was given instructions and counseling regarding his condition or for health maintenance advice. Please see specific information pulled into the AVS if appropriate.  Patient was instructed to call the [facility] with any questions, concerns, or worsening symptoms.  "

## 2022-05-19 PROCEDURE — 93248 EXT ECG>7D<15D REV&INTERPJ: CPT | Performed by: INTERNAL MEDICINE

## 2022-06-02 ENCOUNTER — OFFICE VISIT (OUTPATIENT)
Dept: SLEEP MEDICINE | Facility: HOSPITAL | Age: 32
End: 2022-06-02

## 2022-06-02 VITALS
SYSTOLIC BLOOD PRESSURE: 148 MMHG | WEIGHT: 282.8 LBS | BODY MASS INDEX: 47.12 KG/M2 | DIASTOLIC BLOOD PRESSURE: 76 MMHG | HEART RATE: 86 BPM | OXYGEN SATURATION: 97 % | HEIGHT: 65 IN

## 2022-06-02 DIAGNOSIS — E66.01 MORBID (SEVERE) OBESITY DUE TO EXCESS CALORIES: ICD-10-CM

## 2022-06-02 DIAGNOSIS — R06.83 SNORING: Primary | ICD-10-CM

## 2022-06-02 DIAGNOSIS — G47.33 OBSTRUCTIVE SLEEP APNEA, ADULT: ICD-10-CM

## 2022-06-02 PROCEDURE — 99203 OFFICE O/P NEW LOW 30 MIN: CPT | Performed by: INTERNAL MEDICINE

## 2022-06-21 NOTE — PROGRESS NOTES
Chief Complaint  Snoring and possible sleep disordered breathing    Subjective        Stef Grey presents to [facility] SLEEP MEDICINE for the evaluation of snoring and possible sleep disordered breathing. he is referred by Albin SALAS.  His primary care physician is Dr. Shepard.  He is seen in person in the sleep clinic.  History of Present Illness  Patient's had palpitations noted for 5 years.  He has been seen by cardiology and referred for evaluation of possible sleep disordered breathing as a contributing factor.  He says he has had snoring noted since he was a teenager.  He denies any noted apneas.  He has awaken gasping for breath but says is taken to sleep medications.  He says he usually is rested on arising in the morning.  He may have a morning headache but only once or twice during the year.    He denies awakening with a dry mouth.  He denies breaking his nose or having trouble breathing through his nose.  He will occasionally fall asleep with he sitting quietly during the day is very tired.  He denies any problems driving.  He denies kicking or jerking of his legs at night.  He has a history of scoliosis and has occasional back pain.    Goes to bed about 9 PM.  He will fall asleep in 15 to 30 minutes.  He awakens once or twice during the night.  He thinks he gets 8 to 9 hours of sleep and says he usually feels rested.  He has had hypertension known for 13 years.  He denies any history of diabetes.  He is has a history of palpitations.  He denies any history of coronary artery disease.    Past medical history:    Allergies: Albuterol    Habits: Smoking: Without    Ethanol: Without    Caffeine: Without    Medical illnesses: He has a history of palpitations, hypertension, schizophrenia.    Medications: He is on acetaminophen, atenolol, Strattera, Seroquel.    Surgeries: Had wisdom teeth extraction, right arm fracture repair    Family history: Positives include  "diabetes, hypertension, heart disease, stroke, COPD, asthma, cancer.    Review of systems: Positives include dental problems, hearing loss, back pain, hallucinations.  Other systems were reviewed and reported as negative.    Dailey score is 2/24  Objective   Vital Signs:  /76   Pulse 86   Ht 165.1 cm (65\")   Wt 128 kg (282 lb 12.8 oz)   SpO2 97%   BMI 47.06 kg/m²   Estimated body mass index is 47.06 kg/m² as calculated from the following:    Height as of this encounter: 165.1 cm (65\").    Weight as of this encounter: 128 kg (282 lb 12.8 oz).          Physical Exam patient appears to be awake and alert.  He does not appear to be in acute respiratory distress.    He is normocephalic.  He has Mallampati class IV anatomy.    Lungs are clear.    Cardiac exam revealed normal S1-S2.    Extremities showed no edema.  Result Review :         Assessment and Plan   Diagnoses and all orders for this visit:    1. Snoring (Primary)  -     Home Sleep Study; Future    2. Obstructive sleep apnea, adult  -     Home Sleep Study; Future    3. Morbid (severe) obesity due to excess calories (HCC)  -     Ambulatory Referral to Nutrition Services    Patient has a history of snoring and sometimes nonrestorative sleep.  He has a fair history for obstructive sleep apnea.  We will plan to proceed to home sleep testing.  We have discussed potential therapies including CPAP, weight control, oral appliance, and surgery.  We have discussed the consequences of long-term untreated obstructive sleep apnea.  These include hypertension, diabetes, heart disease, stroke, and dementia.  He is encouraged to lose weight.  He is encouraged to avoid alcohol and sedatives close to bedtime.  He is encouraged to practice lateral position sleep.  We will plan to see him back after his study.  He is willing to have a discussion with the dietitian regarding weight loss.       I spent 35 minutes caring for Stef on this date of service. This time " includes time spent by me in the following activities:obtaining and/or reviewing a separately obtained history, performing a medically appropriate examination and/or evaluation , counseling and educating the patient/family/caregiver, ordering medications, tests, or procedures and documenting information in the medical record  Follow Up   Return for Follow up after study, Next scheduled follow-up.  Patient was given instructions and counseling regarding his condition or for health maintenance advice. Please see specific information pulled into the AVS if appropriate.   Ant Dan MD [facility]  Sleep Medicine  Pulmonary and Critical Care Medicine

## 2022-08-10 ENCOUNTER — TELEMEDICINE (OUTPATIENT)
Dept: CARDIOLOGY | Facility: HOSPITAL | Age: 32
End: 2022-08-10

## 2022-08-10 VITALS — DIASTOLIC BLOOD PRESSURE: 74 MMHG | SYSTOLIC BLOOD PRESSURE: 128 MMHG | HEART RATE: 77 BPM

## 2022-08-10 DIAGNOSIS — E66.01 MORBID OBESITY WITH BMI OF 40.0-44.9, ADULT: ICD-10-CM

## 2022-08-10 DIAGNOSIS — R00.2 PALPITATIONS: Primary | ICD-10-CM

## 2022-08-10 DIAGNOSIS — R07.89 CHEST PAIN, ATYPICAL: ICD-10-CM

## 2022-08-10 DIAGNOSIS — R06.83 SNORING: ICD-10-CM

## 2022-08-10 DIAGNOSIS — I10 ESSENTIAL HYPERTENSION: ICD-10-CM

## 2022-08-10 PROCEDURE — 99213 OFFICE O/P EST LOW 20 MIN: CPT | Performed by: NURSE PRACTITIONER

## 2022-08-10 NOTE — PROGRESS NOTES
[facility], [facility] Heart and Vascular    This was an audio and video enabled telemedicine encounter.    You have chosen to receive care through the use of telemedicine. Telemedicine enables health care providers at different locations to provide safe, effective, and convenient care through the use of technology. As with any health care service, there are risks associated with the use of telemedicine, including equipment failure, poor connections, and  issues.    • Do you understand the risks and benefits of telemedicine as I have explained them to you? Yes  • Have your questions regarding telemedicine been answered? Yes  • Do you consent to the use of telemedicine in your medical care today? Yes    Chief Complaint  No chief complaint on file.    Subjective    History of Present Illness {CC  Problem List  Visit  Diagnosis   Encounters  Notes  Medications  Labs  Result Review Imaging  Media :23}     Stef Grey presents to [facility] CARDIOLOGY for   History of Present Illness     32-year-old male with history of palpitations, chest discomfort, morbid obesity, hypertension, atypical chest pain, family history of premature CAD reporting father had MIs in his 30s.    Treadmill stress test completed 11/16/202020: No ischemia    Palpitations currently treated with atenolol.    Referral been completed to sleep medicine for history of snoring and nighttime awakening.    Extended Holter 3/15/2022: Duration 13 days.  Analyzable data 9 days.  Average heart rate 89 bpm, PACs and PVCs less than 1%.  Patient symptoms associated with sinus tachycardia and sinus.  Heart rate controlled 80%, 20% tachycardia.  No concerning arrhythmias    Patient reports he is doing well.  He has been less anxious about his palpitations.  They are fairly well controlled with his beta-blocker.  He has not had any chest pain or pressure, worsening dyspnea,  dizziness, near syncope, syncope.  He is waiting to have a at home sleep study being arranged by the sleep center.  No concerns reported today.      Objective     Vital Signs:   Vitals:    08/10/22 1109   BP: 128/74   Pulse: 77     There is no height or weight on file to calculate BMI.  Physical Exam  Vitals reviewed.   Constitutional:       General: He is not in acute distress.  Pulmonary:      Effort: Pulmonary effort is normal.   Skin:     Coloration: Skin is not pale.   Neurological:      Mental Status: He is alert.   Psychiatric:         Mood and Affect: Mood normal.         Behavior: Behavior normal. Behavior is cooperative.              Result Review  Data Reviewed:{ Labs  Result Review  Imaging  Med Tab  Media :23}   · Treadmill stress test 11/16/2020: No evidence of ischemia.  Significant exercise impairment.     Extended Holter 3/15/2022: Duration 13 days.  Analyzable data 9 days.  Average heart rate 89 bpm, PACs and PVCs less than 1%.  Patient symptoms associated with sinus tachycardia and sinus.  Heart rate controlled 80%, 20% tachycardia.  No concerning arrhythmias              Assessment and Plan {CC Problem List  Visit Diagnosis  ROS  Review (Popup)  Health Maintenance  Quality  BestPractice  Medications  SmartSets  SnapShot Encounters  Media :23}   1. Palpitations  Occasional, fairly well controlled on beta-blocker.  No concerns at this time    2. Essential hypertension  Reported home blood pressure log well-controlled.    3. Chest pain, atypical  No further chest pain, pressure, worsening dyspnea on exertion    4. Snoring  Plans to complete home sleep study being arranged by the sleep medicine    5. Morbid obesity with BMI of 40.0-44.9, adult (HCC)  Continue diet and exercise modifications for weight loss.    There is no height or weight on file to calculate BMI.        I spent 15 minutes caring for Stef on this date of service. This time includes time spent by me in the  following activities:preparing for the visit, reviewing tests, performing a medically appropriate examination and/or evaluation , counseling and educating the patient/family/caregiver and documenting information in the medical record    Follow Up {Instructions Charge Capture  Follow-up Communications :23}   Return if symptoms worsen or fail to improve.    Patient was given instructions and counseling regarding his condition or for health maintenance advice. Please see specific information pulled into the AVS if appropriate.  Patient was instructed to call the [facility] with any questions, concerns, or worsening symptoms.

## 2023-10-02 ENCOUNTER — APPOINTMENT (OUTPATIENT)
Dept: GENERAL RADIOLOGY | Facility: HOSPITAL | Age: 33
End: 2023-10-02
Payer: COMMERCIAL

## 2023-10-02 ENCOUNTER — HOSPITAL ENCOUNTER (OUTPATIENT)
Facility: HOSPITAL | Age: 33
Discharge: NURSING, REHAB OR LONG-TERM CARE | End: 2023-10-08
Attending: EMERGENCY MEDICINE | Admitting: ORTHOPAEDIC SURGERY
Payer: COMMERCIAL

## 2023-10-02 ENCOUNTER — APPOINTMENT (OUTPATIENT)
Dept: CT IMAGING | Facility: HOSPITAL | Age: 33
End: 2023-10-02
Payer: COMMERCIAL

## 2023-10-02 DIAGNOSIS — S80.01XA CONTUSION OF RIGHT KNEE, INITIAL ENCOUNTER: ICD-10-CM

## 2023-10-02 DIAGNOSIS — G47.34 NOCTURNAL HYPOXIA: ICD-10-CM

## 2023-10-02 DIAGNOSIS — S62.102A CLOSED FRACTURE OF LEFT WRIST, INITIAL ENCOUNTER: Primary | ICD-10-CM

## 2023-10-02 DIAGNOSIS — I10 ESSENTIAL HYPERTENSION: ICD-10-CM

## 2023-10-02 DIAGNOSIS — S52.572A INTRA-ARTICULAR FRACTURE OF DISTAL END OF LEFT RADIUS WITH VOLAR ANGULATION, INITIAL ENCOUNTER: ICD-10-CM

## 2023-10-02 DIAGNOSIS — R00.2 PALPITATIONS: ICD-10-CM

## 2023-10-02 DIAGNOSIS — S50.811A ABRASION OF RIGHT FOREARM, INITIAL ENCOUNTER: ICD-10-CM

## 2023-10-02 DIAGNOSIS — S82.301A CLOSED EXTRA-ARTICULAR FRACTURE OF DISTAL TIBIA, RIGHT, INITIAL ENCOUNTER: ICD-10-CM
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
## 2023-10-02 DIAGNOSIS — S82.209A TIBIAL FRACTURE: ICD-10-CM

## 2023-10-02 DIAGNOSIS — S80.01XA TRAUMATIC HEMATOMA OF RIGHT KNEE, INITIAL ENCOUNTER: ICD-10-CM

## 2023-10-02 PROBLEM — S52.209A ULNAR FRACTURE: Status: ACTIVE | Noted: 2023-10-02

## 2023-10-02 PROBLEM — F20.9 SCHIZOPHRENIA: Status: ACTIVE | Noted: 2023-10-02

## 2023-10-02 PROBLEM — S52.90XA RADIAL FRACTURE: Status: ACTIVE | Noted: 2023-10-02

## 2023-10-02 LAB
ABO GROUP BLD: NORMAL
ABO GROUP BLD: NORMAL
BLD GP AB SCN SERPL QL: NEGATIVE
RH BLD: POSITIVE
RH BLD: POSITIVE
T&S EXPIRATION DATE: NORMAL

## 2023-10-02 PROCEDURE — 86900 BLOOD TYPING SEROLOGIC ABO: CPT

## 2023-10-02 PROCEDURE — 99284 EMERGENCY DEPT VISIT MOD MDM: CPT

## 2023-10-02 PROCEDURE — 86850 RBC ANTIBODY SCREEN: CPT

## 2023-10-02 PROCEDURE — 25010000002 ONDANSETRON PER 1 MG: Performed by: EMERGENCY MEDICINE

## 2023-10-02 PROCEDURE — 73560 X-RAY EXAM OF KNEE 1 OR 2: CPT

## 2023-10-02 PROCEDURE — 85610 PROTHROMBIN TIME: CPT

## 2023-10-02 PROCEDURE — 73700 CT LOWER EXTREMITY W/O DYE: CPT

## 2023-10-02 PROCEDURE — G0378 HOSPITAL OBSERVATION PER HR: HCPCS

## 2023-10-02 PROCEDURE — 96376 TX/PRO/DX INJ SAME DRUG ADON: CPT

## 2023-10-02 PROCEDURE — 73100 X-RAY EXAM OF WRIST: CPT

## 2023-10-02 PROCEDURE — 96375 TX/PRO/DX INJ NEW DRUG ADDON: CPT

## 2023-10-02 PROCEDURE — 73610 X-RAY EXAM OF ANKLE: CPT

## 2023-10-02 PROCEDURE — 86901 BLOOD TYPING SEROLOGIC RH(D): CPT

## 2023-10-02 PROCEDURE — 25010000002 MORPHINE PER 10 MG: Performed by: EMERGENCY MEDICINE

## 2023-10-02 PROCEDURE — 85025 COMPLETE CBC W/AUTO DIFF WBC: CPT

## 2023-10-02 PROCEDURE — 73110 X-RAY EXAM OF WRIST: CPT

## 2023-10-02 PROCEDURE — 80048 BASIC METABOLIC PNL TOTAL CA: CPT

## 2023-10-02 PROCEDURE — 25010000002 HYDROMORPHONE PER 4 MG: Performed by: INTERNAL MEDICINE

## 2023-10-02 PROCEDURE — 83735 ASSAY OF MAGNESIUM: CPT

## 2023-10-02 PROCEDURE — 96374 THER/PROPH/DIAG INJ IV PUSH: CPT

## 2023-10-02 RX ORDER — HYDROCODONE BITARTRATE AND ACETAMINOPHEN 5; 325 MG/1; MG/1
1 TABLET ORAL EVERY 6 HOURS PRN
Status: DISCONTINUED | OUTPATIENT
Start: 2023-10-02 | End: 2023-10-05 | Stop reason: ALTCHOICE

## 2023-10-02 RX ORDER — SODIUM CHLORIDE 9 MG/ML
75 INJECTION, SOLUTION INTRAVENOUS CONTINUOUS
Status: ACTIVE | OUTPATIENT
Start: 2023-10-03 | End: 2023-10-03

## 2023-10-02 RX ORDER — HYDROMORPHONE HYDROCHLORIDE 1 MG/ML
0.5 INJECTION, SOLUTION INTRAMUSCULAR; INTRAVENOUS; SUBCUTANEOUS EVERY 4 HOURS PRN
Status: DISCONTINUED | OUTPATIENT
Start: 2023-10-02 | End: 2023-10-03

## 2023-10-02 RX ORDER — ACETAMINOPHEN 650 MG/1
650 SUPPOSITORY RECTAL EVERY 4 HOURS PRN
Status: DISCONTINUED | OUTPATIENT
Start: 2023-10-02 | End: 2023-10-05 | Stop reason: ALTCHOICE

## 2023-10-02 RX ORDER — ATENOLOL 25 MG/1
25 TABLET ORAL 2 TIMES DAILY
Status: DISCONTINUED | OUTPATIENT
Start: 2023-10-02 | End: 2023-10-07

## 2023-10-02 RX ORDER — LIDOCAINE HYDROCHLORIDE 10 MG/ML
30 INJECTION, SOLUTION EPIDURAL; INFILTRATION; INTRACAUDAL; PERINEURAL ONCE
Status: COMPLETED | OUTPATIENT
Start: 2023-10-02 | End: 2023-10-02

## 2023-10-02 RX ORDER — HYDROCODONE BITARTRATE AND ACETAMINOPHEN 5; 325 MG/1; MG/1
1 TABLET ORAL ONCE
Status: COMPLETED | OUTPATIENT
Start: 2023-10-02 | End: 2023-10-02

## 2023-10-02 RX ORDER — SODIUM CHLORIDE 0.9 % (FLUSH) 0.9 %
10 SYRINGE (ML) INJECTION EVERY 12 HOURS SCHEDULED
Status: DISCONTINUED | OUTPATIENT
Start: 2023-10-02 | End: 2023-10-08 | Stop reason: HOSPADM

## 2023-10-02 RX ORDER — QUETIAPINE FUMARATE 100 MG/1
200 TABLET, FILM COATED ORAL NIGHTLY
Status: DISCONTINUED | OUTPATIENT
Start: 2023-10-02 | End: 2023-10-08 | Stop reason: HOSPADM

## 2023-10-02 RX ORDER — ACETAMINOPHEN 160 MG/5ML
650 SOLUTION ORAL EVERY 4 HOURS PRN
Status: DISCONTINUED | OUTPATIENT
Start: 2023-10-02 | End: 2023-10-05 | Stop reason: ALTCHOICE

## 2023-10-02 RX ORDER — ACETAMINOPHEN 325 MG/1
650 TABLET ORAL EVERY 4 HOURS PRN
Status: DISCONTINUED | OUTPATIENT
Start: 2023-10-02 | End: 2023-10-05 | Stop reason: ALTCHOICE

## 2023-10-02 RX ORDER — ONDANSETRON 2 MG/ML
4 INJECTION INTRAMUSCULAR; INTRAVENOUS
Status: DISCONTINUED | OUTPATIENT
Start: 2023-10-02 | End: 2023-10-08 | Stop reason: HOSPADM

## 2023-10-02 RX ORDER — DIAPER,BRIEF,INFANT-TODD,DISP
1 EACH MISCELLANEOUS EVERY 12 HOURS SCHEDULED
Status: DISCONTINUED | OUTPATIENT
Start: 2023-10-02 | End: 2023-10-08 | Stop reason: HOSPADM

## 2023-10-02 RX ORDER — SODIUM CHLORIDE 9 MG/ML
40 INJECTION, SOLUTION INTRAVENOUS AS NEEDED
Status: DISCONTINUED | OUTPATIENT
Start: 2023-10-02 | End: 2023-10-06 | Stop reason: SDUPTHER

## 2023-10-02 RX ORDER — MORPHINE SULFATE 4 MG/ML
4 INJECTION, SOLUTION INTRAMUSCULAR; INTRAVENOUS
Status: DISCONTINUED | OUTPATIENT
Start: 2023-10-02 | End: 2023-10-02

## 2023-10-02 RX ORDER — SODIUM CHLORIDE 0.9 % (FLUSH) 0.9 %
10 SYRINGE (ML) INJECTION AS NEEDED
Status: DISCONTINUED | OUTPATIENT
Start: 2023-10-02 | End: 2023-10-06 | Stop reason: SDUPTHER

## 2023-10-02 RX ADMIN — Medication 10 ML: at 21:54

## 2023-10-02 RX ADMIN — QUETIAPINE FUMARATE 200 MG: 100 TABLET ORAL at 21:54

## 2023-10-02 RX ADMIN — ONDANSETRON 4 MG: 2 INJECTION INTRAMUSCULAR; INTRAVENOUS at 20:16

## 2023-10-02 RX ADMIN — HYDROCODONE BITARTRATE AND ACETAMINOPHEN 1 TABLET: 5; 325 TABLET ORAL at 13:40

## 2023-10-02 RX ADMIN — HYDROCODONE BITARTRATE AND ACETAMINOPHEN 1 TABLET: 5; 325 TABLET ORAL at 15:26

## 2023-10-02 RX ADMIN — MORPHINE SULFATE 4 MG: 4 INJECTION, SOLUTION INTRAMUSCULAR; INTRAVENOUS at 19:35

## 2023-10-02 RX ADMIN — BACITRACIN 0.9 G: 500 OINTMENT TOPICAL at 21:54

## 2023-10-02 RX ADMIN — LIDOCAINE HYDROCHLORIDE 30 ML: 10 INJECTION, SOLUTION EPIDURAL; INFILTRATION; INTRACAUDAL; PERINEURAL at 20:01

## 2023-10-02 RX ADMIN — ATENOLOL 25 MG: 25 TABLET ORAL at 21:54

## 2023-10-02 RX ADMIN — HYDROMORPHONE HYDROCHLORIDE 0.5 MG: 1 INJECTION, SOLUTION INTRAMUSCULAR; INTRAVENOUS; SUBCUTANEOUS at 20:16

## 2023-10-02 RX ADMIN — ONDANSETRON 4 MG: 2 INJECTION INTRAMUSCULAR; INTRAVENOUS at 19:35
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
## 2023-10-03 ENCOUNTER — APPOINTMENT (OUTPATIENT)
Dept: CT IMAGING | Facility: HOSPITAL | Age: 33
End: 2023-10-03
Payer: COMMERCIAL

## 2023-10-03 LAB
ANION GAP SERPL CALCULATED.3IONS-SCNC: 10 MMOL/L (ref 5–15)
ANION GAP SERPL CALCULATED.3IONS-SCNC: 10 MMOL/L (ref 5–15)
BASOPHILS # BLD AUTO: 0.06 10*3/MM3 (ref 0–0.2)
BASOPHILS # BLD AUTO: 0.09 10*3/MM3 (ref 0–0.2)
BASOPHILS NFR BLD AUTO: 0.5 % (ref 0–1.5)
BASOPHILS NFR BLD AUTO: 0.7 % (ref 0–1.5)
BUN SERPL-MCNC: 20 MG/DL (ref 6–20)
BUN SERPL-MCNC: 21 MG/DL (ref 6–20)
BUN/CREAT SERPL: 25 (ref 7–25)
BUN/CREAT SERPL: 26.9 (ref 7–25)
CALCIUM SPEC-SCNC: 8.8 MG/DL (ref 8.6–10.5)
CALCIUM SPEC-SCNC: 8.8 MG/DL (ref 8.6–10.5)
CHLORIDE SERPL-SCNC: 104 MMOL/L (ref 98–107)
CHLORIDE SERPL-SCNC: 106 MMOL/L (ref 98–107)
CO2 SERPL-SCNC: 24 MMOL/L (ref 22–29)
CO2 SERPL-SCNC: 25 MMOL/L (ref 22–29)
CREAT SERPL-MCNC: 0.78 MG/DL (ref 0.76–1.27)
CREAT SERPL-MCNC: 0.8 MG/DL (ref 0.76–1.27)
DEPRECATED RDW RBC AUTO: 40 FL (ref 37–54)
DEPRECATED RDW RBC AUTO: 41.6 FL (ref 37–54)
EGFRCR SERPLBLD CKD-EPI 2021: 121.3 ML/MIN/1.73
EGFRCR SERPLBLD CKD-EPI 2021: 122.3 ML/MIN/1.73
EOSINOPHIL # BLD AUTO: 0.11 10*3/MM3 (ref 0–0.4)
EOSINOPHIL # BLD AUTO: 0.19 10*3/MM3 (ref 0–0.4)
EOSINOPHIL NFR BLD AUTO: 0.8 % (ref 0.3–6.2)
EOSINOPHIL NFR BLD AUTO: 1.5 % (ref 0.3–6.2)
ERYTHROCYTE [DISTWIDTH] IN BLOOD BY AUTOMATED COUNT: 13.7 % (ref 12.3–15.4)
ERYTHROCYTE [DISTWIDTH] IN BLOOD BY AUTOMATED COUNT: 13.9 % (ref 12.3–15.4)
GLUCOSE SERPL-MCNC: 115 MG/DL (ref 65–99)
GLUCOSE SERPL-MCNC: 134 MG/DL (ref 65–99)
HCT VFR BLD AUTO: 42.9 % (ref 37.5–51)
HCT VFR BLD AUTO: 44.5 % (ref 37.5–51)
HGB BLD-MCNC: 14.2 G/DL (ref 13–17.7)
HGB BLD-MCNC: 14.9 G/DL (ref 13–17.7)
IMM GRANULOCYTES # BLD AUTO: 0.18 10*3/MM3 (ref 0–0.05)
IMM GRANULOCYTES # BLD AUTO: 0.27 10*3/MM3 (ref 0–0.05)
IMM GRANULOCYTES NFR BLD AUTO: 1.5 % (ref 0–0.5)
IMM GRANULOCYTES NFR BLD AUTO: 2 % (ref 0–0.5)
INR PPP: 1.06 (ref 0.89–1.12)
LYMPHOCYTES # BLD AUTO: 1.84 10*3/MM3 (ref 0.7–3.1)
LYMPHOCYTES # BLD AUTO: 1.94 10*3/MM3 (ref 0.7–3.1)
LYMPHOCYTES NFR BLD AUTO: 14.3 % (ref 19.6–45.3)
LYMPHOCYTES NFR BLD AUTO: 14.9 % (ref 19.6–45.3)
MAGNESIUM SERPL-MCNC: 1.9 MG/DL (ref 1.6–2.6)
MCH RBC QN AUTO: 27.3 PG (ref 26.6–33)
MCH RBC QN AUTO: 27.5 PG (ref 26.6–33)
MCHC RBC AUTO-ENTMCNC: 33.1 G/DL (ref 31.5–35.7)
MCHC RBC AUTO-ENTMCNC: 33.5 G/DL (ref 31.5–35.7)
MCV RBC AUTO: 81.7 FL (ref 79–97)
MCV RBC AUTO: 83.1 FL (ref 79–97)
MONOCYTES # BLD AUTO: 0.96 10*3/MM3 (ref 0.1–0.9)
MONOCYTES # BLD AUTO: 0.96 10*3/MM3 (ref 0.1–0.9)
MONOCYTES NFR BLD AUTO: 7.1 % (ref 5–12)
MONOCYTES NFR BLD AUTO: 7.8 % (ref 5–12)
NEUTROPHILS NFR BLD AUTO: 10.24 10*3/MM3 (ref 1.7–7)
NEUTROPHILS NFR BLD AUTO: 73.8 % (ref 42.7–76)
NEUTROPHILS NFR BLD AUTO: 75.1 % (ref 42.7–76)
NEUTROPHILS NFR BLD AUTO: 9.08 10*3/MM3 (ref 1.7–7)
NRBC BLD AUTO-RTO: 0 /100 WBC (ref 0–0.2)
NRBC BLD AUTO-RTO: 0 /100 WBC (ref 0–0.2)
PLATELET # BLD AUTO: 184 10*3/MM3 (ref 140–450)
PLATELET # BLD AUTO: 192 10*3/MM3 (ref 140–450)
PMV BLD AUTO: 12.4 FL (ref 6–12)
PMV BLD AUTO: 12.5 FL (ref 6–12)
POTASSIUM SERPL-SCNC: 3.8 MMOL/L (ref 3.5–5.2)
POTASSIUM SERPL-SCNC: 4.3 MMOL/L (ref 3.5–5.2)
PROTHROMBIN TIME: 14 SECONDS (ref 12.2–14.5)
RBC # BLD AUTO: 5.16 10*6/MM3 (ref 4.14–5.8)
RBC # BLD AUTO: 5.45 10*6/MM3 (ref 4.14–5.8)
SODIUM SERPL-SCNC: 139 MMOL/L (ref 136–145)
SODIUM SERPL-SCNC: 140 MMOL/L (ref 136–145)
WBC NRBC COR # BLD: 12.31 10*3/MM3 (ref 3.4–10.8)
WBC NRBC COR # BLD: 13.61 10*3/MM3 (ref 3.4–10.8)

## 2023-10-03 PROCEDURE — 25010000002 HYDROMORPHONE PER 4 MG: Performed by: INTERNAL MEDICINE

## 2023-10-03 PROCEDURE — 25810000003 SODIUM CHLORIDE 0.9 % SOLUTION

## 2023-10-03 PROCEDURE — G0378 HOSPITAL OBSERVATION PER HR: HCPCS

## 2023-10-03 PROCEDURE — 85025 COMPLETE CBC W/AUTO DIFF WBC: CPT

## 2023-10-03 PROCEDURE — 80048 BASIC METABOLIC PNL TOTAL CA: CPT

## 2023-10-03 PROCEDURE — 96376 TX/PRO/DX INJ SAME DRUG ADON: CPT

## 2023-10-03 PROCEDURE — 73200 CT UPPER EXTREMITY W/O DYE: CPT

## 2023-10-03 PROCEDURE — 76377 3D RENDER W/INTRP POSTPROCES: CPT

## 2023-10-03 RX ORDER — HYDROMORPHONE HYDROCHLORIDE 1 MG/ML
0.5 INJECTION, SOLUTION INTRAMUSCULAR; INTRAVENOUS; SUBCUTANEOUS
Status: DISCONTINUED | OUTPATIENT
Start: 2023-10-03 | End: 2023-10-05 | Stop reason: ALTCHOICE

## 2023-10-03 RX ADMIN — QUETIAPINE FUMARATE 200 MG: 100 TABLET ORAL at 21:27

## 2023-10-03 RX ADMIN — ATENOLOL 25 MG: 25 TABLET ORAL at 08:40

## 2023-10-03 RX ADMIN — HYDROMORPHONE HYDROCHLORIDE 0.5 MG: 1 INJECTION, SOLUTION INTRAMUSCULAR; INTRAVENOUS; SUBCUTANEOUS at 00:08

## 2023-10-03 RX ADMIN — Medication 10 ML: at 08:40

## 2023-10-03 RX ADMIN — HYDROCODONE BITARTRATE AND ACETAMINOPHEN 1 TABLET: 5; 325 TABLET ORAL at 08:40

## 2023-10-03 RX ADMIN — HYDROCODONE BITARTRATE AND ACETAMINOPHEN 1 TABLET: 5; 325 TABLET ORAL at 22:09

## 2023-10-03 RX ADMIN — BACITRACIN 0.9 G: 500 OINTMENT TOPICAL at 21:28

## 2023-10-03 RX ADMIN — HYDROMORPHONE HYDROCHLORIDE 0.5 MG: 1 INJECTION, SOLUTION INTRAMUSCULAR; INTRAVENOUS; SUBCUTANEOUS at 04:57

## 2023-10-03 RX ADMIN — SODIUM CHLORIDE 75 ML/HR: 9 INJECTION, SOLUTION INTRAVENOUS at 00:08

## 2023-10-03 RX ADMIN — BACITRACIN 0.9 G: 500 OINTMENT TOPICAL at 08:44

## 2023-10-03 RX ADMIN — Medication 10 ML: at 21:31

## 2023-10-03 RX ADMIN — HYDROCODONE BITARTRATE AND ACETAMINOPHEN 1 TABLET: 5; 325 TABLET ORAL at 16:24

## 2023-10-03 RX ADMIN — ATENOLOL 25 MG: 25 TABLET ORAL at 21:27

## 2023-10-03 RX ADMIN — ACETAMINOPHEN 650 MG: 325 TABLET ORAL at 21:27
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
## 2023-10-03 NOTE — NURSING NOTE
Pt arrived from ER, assessment completed upon arrival, VSS, oriented patient to room and unit policies,  A&O x4, dressing noted to right lower FA abrasion- nonadherent pad with gauze CDI, splint noted to left lower arm- c/di  numbness reported per patient with no numbness or tingling, right knee swelling and bruising noted- patient refused ice, elevation, and to allow RN to wrap leg, tenderness present to lower right extremity, pt stated pain was well controlled with PRN dilaudid, IV flushed- patient refused to allow RN to change EMS IV per unit policy,  bed alarm on, call light within reach, will continue to monitor.

## 2023-10-03 NOTE — PLAN OF CARE
Goal Outcome Evaluation:   Pt resting comfortably in bed, pain well controlled with PRN medications, Npo after MN for sx in morning, fall precautions in place, splint in place to left FA, neuro checks completed q4h with numbness to left fingers present, pt continues to refuse lower extremity wrapping/elevation/ice, IVF infusing per order, will continue to monitor   Problem: Fall Injury Risk  Goal: Absence of Fall and Fall-Related Injury  Outcome: Ongoing, Not Progressing  Intervention: Identify and Manage Contributors  Recent Flowsheet Documentation  Taken 10/2/2023 2206 by Lovely Hollins RN  Medication Review/Management: medications reviewed  Intervention: Promote Injury-Free Environment  Recent Flowsheet Documentation  Taken 10/3/2023 0226 by Lovely Hollins RN  Safety Promotion/Fall Prevention: activity supervised  Taken 10/3/2023 0010 by Lovely Hollins RN  Safety Promotion/Fall Prevention:   activity supervised   safety round/check completed   toileting scheduled   lighting adjusted  Taken 10/2/2023 2206 by Lovely Hollins RN  Safety Promotion/Fall Prevention:   toileting scheduled   safety round/check completed   lighting adjusted   activity supervised  Taken 10/2/2023 2046 by Lovely Hollins RN  Safety Promotion/Fall Prevention:   activity supervised   toileting scheduled   safety round/check completed   room organization consistent   nonskid shoes/slippers when out of bed   gait belt   lighting adjusted     Problem: Pain Acute  Goal: Acceptable Pain Control and Functional Ability  Outcome: Ongoing, Not Progressing  Intervention: Prevent or Manage Pain  Recent Flowsheet Documentation  Taken 10/2/2023 2206 by Lovely Hollins RN  Medication Review/Management: medications reviewed  Taken 10/2/2023 2046 by Lovely Hollins RN  Sensory Stimulation Regulation: lighting decreased  Bowel Elimination Promotion: adequate fluid intake promoted  Intervention: Optimize Psychosocial Wellbeing  Recent Flowsheet  Documentation  Taken 10/2/2023 2046 by Lovely Hollins, RN  Supportive Measures: active listening utilized
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
## 2023-10-04 ENCOUNTER — ANESTHESIA EVENT (OUTPATIENT)
Dept: PERIOP | Facility: HOSPITAL | Age: 33
End: 2023-10-04
Payer: COMMERCIAL

## 2023-10-04 ENCOUNTER — PREP FOR SURGERY (OUTPATIENT)
Dept: OTHER | Facility: HOSPITAL | Age: 33
End: 2023-10-04
Payer: COMMERCIAL

## 2023-10-04 ENCOUNTER — APPOINTMENT (OUTPATIENT)
Dept: GENERAL RADIOLOGY | Facility: HOSPITAL | Age: 33
End: 2023-10-04
Payer: COMMERCIAL

## 2023-10-04 DIAGNOSIS — S82.209A TIBIAL FRACTURE: Primary | ICD-10-CM

## 2023-10-04 DIAGNOSIS — S52.572A INTRA-ARTICULAR FRACTURE OF DISTAL END OF LEFT RADIUS WITH VOLAR ANGULATION, INITIAL ENCOUNTER: Primary | ICD-10-CM

## 2023-10-04 LAB
ANION GAP SERPL CALCULATED.3IONS-SCNC: 10 MMOL/L (ref 5–15)
BUN SERPL-MCNC: 17 MG/DL (ref 6–20)
BUN/CREAT SERPL: 23 (ref 7–25)
CALCIUM SPEC-SCNC: 8.8 MG/DL (ref 8.6–10.5)
CHLORIDE SERPL-SCNC: 105 MMOL/L (ref 98–107)
CO2 SERPL-SCNC: 25 MMOL/L (ref 22–29)
CREAT SERPL-MCNC: 0.74 MG/DL (ref 0.76–1.27)
DEPRECATED RDW RBC AUTO: 43.2 FL (ref 37–54)
EGFRCR SERPLBLD CKD-EPI 2021: 124.2 ML/MIN/1.73
ERYTHROCYTE [DISTWIDTH] IN BLOOD BY AUTOMATED COUNT: 14 % (ref 12.3–15.4)
GLUCOSE SERPL-MCNC: 157 MG/DL (ref 65–99)
HCT VFR BLD AUTO: 43.5 % (ref 37.5–51)
HGB BLD-MCNC: 14.1 G/DL (ref 13–17.7)
MCH RBC QN AUTO: 27.5 PG (ref 26.6–33)
MCHC RBC AUTO-ENTMCNC: 32.4 G/DL (ref 31.5–35.7)
MCV RBC AUTO: 85 FL (ref 79–97)
PLATELET # BLD AUTO: 174 10*3/MM3 (ref 140–450)
PMV BLD AUTO: 12.1 FL (ref 6–12)
POTASSIUM SERPL-SCNC: 3.9 MMOL/L (ref 3.5–5.2)
RBC # BLD AUTO: 5.12 10*6/MM3 (ref 4.14–5.8)
SODIUM SERPL-SCNC: 140 MMOL/L (ref 136–145)
WBC NRBC COR # BLD: 11.94 10*3/MM3 (ref 3.4–10.8)

## 2023-10-04 PROCEDURE — 73590 X-RAY EXAM OF LOWER LEG: CPT

## 2023-10-04 PROCEDURE — G0378 HOSPITAL OBSERVATION PER HR: HCPCS

## 2023-10-04 PROCEDURE — 80048 BASIC METABOLIC PNL TOTAL CA: CPT | Performed by: HOSPITALIST

## 2023-10-04 PROCEDURE — 85027 COMPLETE CBC AUTOMATED: CPT | Performed by: HOSPITALIST

## 2023-10-04 PROCEDURE — 25010000002 HYDROMORPHONE PER 4 MG: Performed by: HOSPITALIST

## 2023-10-04 PROCEDURE — 96376 TX/PRO/DX INJ SAME DRUG ADON: CPT

## 2023-10-04 RX ORDER — FAMOTIDINE 10 MG/ML
20 INJECTION, SOLUTION INTRAVENOUS ONCE
Status: CANCELLED | OUTPATIENT
Start: 2023-10-04 | End: 2023-10-04

## 2023-10-04 RX ORDER — CEFAZOLIN SODIUM IN 0.9 % NACL 3 G/100 ML
3000 INTRAVENOUS SOLUTION, PIGGYBACK (ML) INTRAVENOUS ONCE
Status: CANCELLED | OUTPATIENT
Start: 2023-10-04 | End: 2023-10-04

## 2023-10-04 RX ORDER — LIDOCAINE HYDROCHLORIDE 10 MG/ML
0.5 INJECTION, SOLUTION EPIDURAL; INFILTRATION; INTRACAUDAL; PERINEURAL ONCE AS NEEDED
Status: CANCELLED | OUTPATIENT
Start: 2023-10-04

## 2023-10-04 RX ADMIN — HYDROMORPHONE HYDROCHLORIDE 0.5 MG: 1 INJECTION, SOLUTION INTRAMUSCULAR; INTRAVENOUS; SUBCUTANEOUS at 05:57

## 2023-10-04 RX ADMIN — BACITRACIN 0.9 G: 500 OINTMENT TOPICAL at 10:04

## 2023-10-04 RX ADMIN — HYDROCODONE BITARTRATE AND ACETAMINOPHEN 1 TABLET: 5; 325 TABLET ORAL at 10:04

## 2023-10-04 RX ADMIN — ATENOLOL 25 MG: 25 TABLET ORAL at 19:28

## 2023-10-04 RX ADMIN — BACITRACIN 0.9 G: 500 OINTMENT TOPICAL at 19:29

## 2023-10-04 RX ADMIN — HYDROCODONE BITARTRATE AND ACETAMINOPHEN 1 TABLET: 5; 325 TABLET ORAL at 17:15

## 2023-10-04 RX ADMIN — Medication 10 ML: at 19:28

## 2023-10-04 RX ADMIN — ATENOLOL 25 MG: 25 TABLET ORAL at 10:04

## 2023-10-04 RX ADMIN — QUETIAPINE FUMARATE 200 MG: 100 TABLET ORAL at 19:28

## 2023-10-04 NOTE — PLAN OF CARE
Problem: Fall Injury Risk  Goal: Absence of Fall and Fall-Related Injury  Outcome: Ongoing, Progressing  Intervention: Identify and Manage Contributors  Recent Flowsheet Documentation  Taken 10/3/2023 1944 by Lovely Hollins RN  Medication Review/Management: medications reviewed  Intervention: Promote Injury-Free Environment  Recent Flowsheet Documentation  Taken 10/4/2023 0200 by Lovely Hollins RN  Safety Promotion/Fall Prevention: activity supervised  Taken 10/4/2023 0018 by Lovely Hollins RN  Safety Promotion/Fall Prevention: activity supervised  Taken 10/3/2023 2210 by Lovely Hollins RN  Safety Promotion/Fall Prevention:   activity supervised   safety round/check completed  Taken 10/3/2023 1944 by Lovely Hollins RN  Safety Promotion/Fall Prevention: activity supervised     Problem: Pain Acute  Goal: Acceptable Pain Control and Functional Ability  Outcome: Ongoing, Progressing  Intervention: Prevent or Manage Pain  Recent Flowsheet Documentation  Taken 10/3/2023 1944 by Lovely Hollins RN  Sensory Stimulation Regulation: lighting decreased  Medication Review/Management: medications reviewed  Intervention: Develop Pain Management Plan  Recent Flowsheet Documentation  Taken 10/3/2023 2210 by Lovely Hollins RN  Pain Management Interventions: see MAR  Taken 10/3/2023 1944 by Lovely Hollins RN  Pain Management Interventions: pillow support provided   Goal Outcome Evaluation:    Pt resting comfortably in bed, pain well controlled with PRN medications, fall precautions in place, splint in place to left FA, neuro checks completed q4h with no numbness/tingling, bilat foot pump for VTE, ice to right knee, pillow elevation provided, will continue to monitor
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
## 2023-10-05 ENCOUNTER — ANESTHESIA EVENT CONVERTED (OUTPATIENT)
Dept: ANESTHESIOLOGY | Facility: HOSPITAL | Age: 33
End: 2023-10-05
Payer: COMMERCIAL

## 2023-10-05 ENCOUNTER — APPOINTMENT (OUTPATIENT)
Dept: GENERAL RADIOLOGY | Facility: HOSPITAL | Age: 33
End: 2023-10-05
Payer: COMMERCIAL

## 2023-10-05 ENCOUNTER — ANESTHESIA (OUTPATIENT)
Dept: PERIOP | Facility: HOSPITAL | Age: 33
End: 2023-10-05
Payer: COMMERCIAL

## 2023-10-05 ENCOUNTER — TELEPHONE (OUTPATIENT)
Dept: ORTHOPEDIC SURGERY | Facility: CLINIC | Age: 33
End: 2023-10-05
Payer: COMMERCIAL

## 2023-10-05 PROCEDURE — 25810000003 LACTATED RINGERS PER 1000 ML: Performed by: ANESTHESIOLOGY

## 2023-10-05 PROCEDURE — 25010000002 CEFAZOLIN IN DEXTROSE 2-4 GM/100ML-% SOLUTION: Performed by: ORTHOPAEDIC SURGERY

## 2023-10-05 PROCEDURE — 25010000002 HYDROMORPHONE 1 MG/ML SOLUTION

## 2023-10-05 PROCEDURE — 25810000003 LACTATED RINGERS PER 1000 ML: Performed by: ORTHOPAEDIC SURGERY

## 2023-10-05 PROCEDURE — C1713 ANCHOR/SCREW BN/BN,TIS/BN: HCPCS | Performed by: ORTHOPAEDIC SURGERY

## 2023-10-05 PROCEDURE — 25010000002 HYDROMORPHONE PER 4 MG: Performed by: HOSPITALIST

## 2023-10-05 PROCEDURE — 25010000002 BUPIVACAINE (PF) 0.25 % SOLUTION: Performed by: NURSE ANESTHETIST, CERTIFIED REGISTERED

## 2023-10-05 PROCEDURE — 25010000002 FENTANYL CITRATE (PF) 50 MCG/ML SOLUTION: Performed by: NURSE ANESTHETIST, CERTIFIED REGISTERED

## 2023-10-05 PROCEDURE — 76000 FLUOROSCOPY <1 HR PHYS/QHP: CPT

## 2023-10-05 PROCEDURE — 25010000002 DROPERIDOL PER 5 MG

## 2023-10-05 PROCEDURE — 96376 TX/PRO/DX INJ SAME DRUG ADON: CPT

## 2023-10-05 PROCEDURE — 25010000002 ONDANSETRON PER 1 MG: Performed by: ANESTHESIOLOGY

## 2023-10-05 PROCEDURE — 25010000002 DEXAMETHASONE SODIUM PHOSPHATE 10 MG/ML SOLUTION: Performed by: NURSE ANESTHETIST, CERTIFIED REGISTERED

## 2023-10-05 PROCEDURE — 25010000002 CEFAZOLIN PER 500 MG: Performed by: ORTHOPAEDIC SURGERY

## 2023-10-05 PROCEDURE — 25010000002 ROPIVACAINE PER 1 MG: Performed by: ORTHOPAEDIC SURGERY

## 2023-10-05 PROCEDURE — 25010000002 PROPOFOL 10 MG/ML EMULSION

## 2023-10-05 PROCEDURE — 25010000002 SUGAMMADEX 200 MG/2ML SOLUTION: Performed by: ANESTHESIOLOGY

## 2023-10-05 PROCEDURE — 25010000002 MIDAZOLAM PER 1 MG: Performed by: NURSE ANESTHETIST, CERTIFIED REGISTERED

## 2023-10-05 PROCEDURE — G0378 HOSPITAL OBSERVATION PER HR: HCPCS

## 2023-10-05 PROCEDURE — 25810000003 SODIUM CHLORIDE 0.9 % SOLUTION: Performed by: ORTHOPAEDIC SURGERY

## 2023-10-05 PROCEDURE — 25010000002 ROPIVACAINE PER 1 MG: Performed by: NURSE ANESTHETIST, CERTIFIED REGISTERED

## 2023-10-05 PROCEDURE — 25010000002 PHENYLEPHRINE 10 MG/ML SOLUTION 1 ML VIAL: Performed by: ANESTHESIOLOGY

## 2023-10-05 PROCEDURE — 25010000002 DEXAMETHASONE PER 1 MG

## 2023-10-05 DEVICE — PLT TUB LK THRD 4H: Type: IMPLANTABLE DEVICE | Site: ANKLE | Status: FUNCTIONAL

## 2023-10-05 DEVICE — IMPLANTABLE DEVICE: Type: IMPLANTABLE DEVICE | Site: WRIST | Status: FUNCTIONAL

## 2023-10-05 DEVICE — IMPLANTABLE DEVICE: Type: IMPLANTABLE DEVICE | Site: ANKLE | Status: FUNCTIONAL

## 2023-10-05 DEVICE — SCRW GEMINUS PA NL TI 3.5X12MM: Type: IMPLANTABLE DEVICE | Site: WRIST | Status: FUNCTIONAL

## 2023-10-05 DEVICE — PEG GEMINUS THRD LK TI 2.3X18MM: Type: IMPLANTABLE DEVICE | Site: WRIST | Status: FUNCTIONAL

## 2023-10-05 DEVICE — PEG GEMINUS THRD LK TI 2.3X20MM: Type: IMPLANTABLE DEVICE | Site: WRIST | Status: FUNCTIONAL

## 2023-10-05 DEVICE — SCRW GEMINUS PA NL 3TI .5X13MM: Type: IMPLANTABLE DEVICE | Site: WRIST | Status: FUNCTIONAL

## 2023-10-05 DEVICE — SCRW CORT LP SS 3.5X14MM: Type: IMPLANTABLE DEVICE | Site: ANKLE | Status: FUNCTIONAL

## 2023-10-05 DEVICE — PEG GEMINUS THRD LK TI 2.3X19MM: Type: IMPLANTABLE DEVICE | Site: WRIST | Status: FUNCTIONAL

## 2023-10-05 RX ORDER — POLYETHYLENE GLYCOL 3350 17 G/17G
17 POWDER, FOR SOLUTION ORAL AS NEEDED
Status: DISCONTINUED | OUTPATIENT
Start: 2023-10-05 | End: 2023-10-08 | Stop reason: HOSPADM

## 2023-10-05 RX ORDER — BUPIVACAINE HYDROCHLORIDE 2.5 MG/ML
INJECTION, SOLUTION EPIDURAL; INFILTRATION; INTRACAUDAL
Status: COMPLETED | OUTPATIENT
Start: 2023-10-05 | End: 2023-10-05

## 2023-10-05 RX ORDER — FENTANYL CITRATE 50 UG/ML
INJECTION, SOLUTION INTRAMUSCULAR; INTRAVENOUS
Status: DISCONTINUED | OUTPATIENT
Start: 2023-10-05 | End: 2023-10-05

## 2023-10-05 RX ORDER — DROPERIDOL 2.5 MG/ML
INJECTION, SOLUTION INTRAMUSCULAR; INTRAVENOUS
Status: COMPLETED
Start: 2023-10-05 | End: 2023-10-05

## 2023-10-05 RX ORDER — NALOXONE HCL 0.4 MG/ML
0.1 VIAL (ML) INJECTION
Status: DISCONTINUED | OUTPATIENT
Start: 2023-10-05 | End: 2023-10-08 | Stop reason: HOSPADM

## 2023-10-05 RX ORDER — AMOXICILLIN 250 MG
2 CAPSULE ORAL NIGHTLY PRN
Status: DISCONTINUED | OUTPATIENT
Start: 2023-10-05 | End: 2023-10-08 | Stop reason: HOSPADM

## 2023-10-05 RX ORDER — SODIUM CHLORIDE 0.9 % (FLUSH) 0.9 %
10 SYRINGE (ML) INJECTION EVERY 12 HOURS SCHEDULED
Status: DISCONTINUED | OUTPATIENT
Start: 2023-10-05 | End: 2023-10-08 | Stop reason: HOSPADM

## 2023-10-05 RX ORDER — CEFAZOLIN SODIUM 2 G/100ML
2000 INJECTION, SOLUTION INTRAVENOUS EVERY 8 HOURS
Status: COMPLETED | OUTPATIENT
Start: 2023-10-05 | End: 2023-10-06

## 2023-10-05 RX ORDER — ONDANSETRON 2 MG/ML
4 INJECTION INTRAMUSCULAR; INTRAVENOUS ONCE AS NEEDED
Status: DISCONTINUED | OUTPATIENT
Start: 2023-10-05 | End: 2023-10-05 | Stop reason: HOSPADM

## 2023-10-05 RX ORDER — ACETAMINOPHEN 650 MG/1
650 SUPPOSITORY RECTAL EVERY 4 HOURS PRN
Status: DISCONTINUED | OUTPATIENT
Start: 2023-10-05 | End: 2023-10-06

## 2023-10-05 RX ORDER — MAGNESIUM HYDROXIDE 1200 MG/15ML
LIQUID ORAL AS NEEDED
Status: DISCONTINUED | OUTPATIENT
Start: 2023-10-05 | End: 2023-10-05 | Stop reason: HOSPADM

## 2023-10-05 RX ORDER — ASPIRIN 81 MG/1
81 TABLET ORAL DAILY
Qty: 30 TABLET | Refills: 0 | Status: CANCELLED | OUTPATIENT
Start: 2023-10-05 | End: 2023-11-04

## 2023-10-05 RX ORDER — SODIUM CHLORIDE, SODIUM LACTATE, POTASSIUM CHLORIDE, CALCIUM CHLORIDE 600; 310; 30; 20 MG/100ML; MG/100ML; MG/100ML; MG/100ML
9 INJECTION, SOLUTION INTRAVENOUS CONTINUOUS
Status: DISCONTINUED | OUTPATIENT
Start: 2023-10-05 | End: 2023-10-08 | Stop reason: HOSPADM

## 2023-10-05 RX ORDER — ACETAMINOPHEN 325 MG/1
650 TABLET ORAL EVERY 4 HOURS PRN
Status: DISCONTINUED | OUTPATIENT
Start: 2023-10-05 | End: 2023-10-06

## 2023-10-05 RX ORDER — ASPIRIN 81 MG/1
81 TABLET ORAL DAILY
Status: DISCONTINUED | OUTPATIENT
Start: 2023-10-05 | End: 2023-10-08 | Stop reason: HOSPADM

## 2023-10-05 RX ORDER — SODIUM CHLORIDE 0.9 % (FLUSH) 0.9 %
10 SYRINGE (ML) INJECTION AS NEEDED
Status: DISCONTINUED | OUTPATIENT
Start: 2023-10-05 | End: 2023-10-08 | Stop reason: HOSPADM

## 2023-10-05 RX ORDER — ONDANSETRON 2 MG/ML
INJECTION INTRAMUSCULAR; INTRAVENOUS AS NEEDED
Status: DISCONTINUED | OUTPATIENT
Start: 2023-10-05 | End: 2023-10-05 | Stop reason: SURG

## 2023-10-05 RX ORDER — ROPIVACAINE HYDROCHLORIDE 2 MG/ML
INJECTION, SOLUTION EPIDURAL; INFILTRATION; PERINEURAL CONTINUOUS
Status: DISCONTINUED | OUTPATIENT
Start: 2023-10-05 | End: 2023-10-08 | Stop reason: HOSPADM

## 2023-10-05 RX ORDER — DROPERIDOL 2.5 MG/ML
0.62 INJECTION, SOLUTION INTRAMUSCULAR; INTRAVENOUS ONCE
Status: COMPLETED | OUTPATIENT
Start: 2023-10-05 | End: 2023-10-05

## 2023-10-05 RX ORDER — DEXAMETHASONE SODIUM PHOSPHATE 4 MG/ML
INJECTION, SOLUTION INTRA-ARTICULAR; INTRALESIONAL; INTRAMUSCULAR; INTRAVENOUS; SOFT TISSUE AS NEEDED
Status: DISCONTINUED | OUTPATIENT
Start: 2023-10-05 | End: 2023-10-05 | Stop reason: SURG

## 2023-10-05 RX ORDER — SODIUM CHLORIDE 0.9 % (FLUSH) 0.9 %
10 SYRINGE (ML) INJECTION AS NEEDED
Status: DISCONTINUED | OUTPATIENT
Start: 2023-10-05 | End: 2023-10-05 | Stop reason: HOSPADM

## 2023-10-05 RX ORDER — BISACODYL 10 MG
10 SUPPOSITORY, RECTAL RECTAL DAILY PRN
Status: DISCONTINUED | OUTPATIENT
Start: 2023-10-05 | End: 2023-10-08 | Stop reason: HOSPADM

## 2023-10-05 RX ORDER — MIDAZOLAM HYDROCHLORIDE 1 MG/ML
INJECTION INTRAMUSCULAR; INTRAVENOUS
Status: COMPLETED | OUTPATIENT
Start: 2023-10-05 | End: 2023-10-05

## 2023-10-05 RX ORDER — SODIUM CHLORIDE 9 MG/ML
40 INJECTION, SOLUTION INTRAVENOUS AS NEEDED
Status: DISCONTINUED | OUTPATIENT
Start: 2023-10-05 | End: 2023-10-08 | Stop reason: HOSPADM

## 2023-10-05 RX ORDER — CHOLECALCIFEROL (VITAMIN D3) 125 MCG
5 CAPSULE ORAL NIGHTLY PRN
Status: DISCONTINUED | OUTPATIENT
Start: 2023-10-05 | End: 2023-10-08 | Stop reason: HOSPADM

## 2023-10-05 RX ORDER — SODIUM CHLORIDE 9 MG/ML
40 INJECTION, SOLUTION INTRAVENOUS AS NEEDED
Status: DISCONTINUED | OUTPATIENT
Start: 2023-10-05 | End: 2023-10-05 | Stop reason: HOSPADM

## 2023-10-05 RX ORDER — HYDROMORPHONE HYDROCHLORIDE 1 MG/ML
0.5 INJECTION, SOLUTION INTRAMUSCULAR; INTRAVENOUS; SUBCUTANEOUS
Status: DISCONTINUED | OUTPATIENT
Start: 2023-10-05 | End: 2023-10-07

## 2023-10-05 RX ORDER — SODIUM CHLORIDE 0.9 % (FLUSH) 0.9 %
10 SYRINGE (ML) INJECTION EVERY 12 HOURS SCHEDULED
Status: DISCONTINUED | OUTPATIENT
Start: 2023-10-05 | End: 2023-10-05 | Stop reason: HOSPADM

## 2023-10-05 RX ORDER — FAMOTIDINE 20 MG/1
20 TABLET, FILM COATED ORAL ONCE
Status: COMPLETED | OUTPATIENT
Start: 2023-10-05 | End: 2023-10-05

## 2023-10-05 RX ORDER — LIDOCAINE HYDROCHLORIDE 10 MG/ML
INJECTION, SOLUTION EPIDURAL; INFILTRATION; INTRACAUDAL; PERINEURAL AS NEEDED
Status: DISCONTINUED | OUTPATIENT
Start: 2023-10-05 | End: 2023-10-05 | Stop reason: SURG

## 2023-10-05 RX ORDER — MIDAZOLAM HYDROCHLORIDE 1 MG/ML
1 INJECTION INTRAMUSCULAR; INTRAVENOUS
Status: DISCONTINUED | OUTPATIENT
Start: 2023-10-05 | End: 2023-10-05 | Stop reason: HOSPADM

## 2023-10-05 RX ORDER — DEXAMETHASONE SODIUM PHOSPHATE 10 MG/ML
INJECTION, SOLUTION INTRAMUSCULAR; INTRAVENOUS
Status: COMPLETED | OUTPATIENT
Start: 2023-10-05 | End: 2023-10-05

## 2023-10-05 RX ORDER — HYDROMORPHONE HYDROCHLORIDE 1 MG/ML
0.5 INJECTION, SOLUTION INTRAMUSCULAR; INTRAVENOUS; SUBCUTANEOUS
Status: DISCONTINUED | OUTPATIENT
Start: 2023-10-05 | End: 2023-10-05 | Stop reason: HOSPADM

## 2023-10-05 RX ORDER — CEFAZOLIN SODIUM IN 0.9 % NACL 3 G/100 ML
3000 INTRAVENOUS SOLUTION, PIGGYBACK (ML) INTRAVENOUS ONCE
Status: COMPLETED | OUTPATIENT
Start: 2023-10-05 | End: 2023-10-05

## 2023-10-05 RX ORDER — BUPIVACAINE HCL/0.9 % NACL/PF 0.125 %
PLASTIC BAG, INJECTION (ML) EPIDURAL AS NEEDED
Status: DISCONTINUED | OUTPATIENT
Start: 2023-10-05 | End: 2023-10-05 | Stop reason: SURG

## 2023-10-05 RX ORDER — MIDAZOLAM HYDROCHLORIDE 1 MG/ML
INJECTION INTRAMUSCULAR; INTRAVENOUS
Status: DISCONTINUED | OUTPATIENT
Start: 2023-10-05 | End: 2023-10-05

## 2023-10-05 RX ORDER — PROPOFOL 10 MG/ML
VIAL (ML) INTRAVENOUS AS NEEDED
Status: DISCONTINUED | OUTPATIENT
Start: 2023-10-05 | End: 2023-10-05 | Stop reason: SURG

## 2023-10-05 RX ORDER — ROCURONIUM BROMIDE 10 MG/ML
INJECTION, SOLUTION INTRAVENOUS AS NEEDED
Status: DISCONTINUED | OUTPATIENT
Start: 2023-10-05 | End: 2023-10-05 | Stop reason: SURG

## 2023-10-05 RX ORDER — OXYCODONE HYDROCHLORIDE 5 MG/1
5 TABLET ORAL EVERY 4 HOURS PRN
Status: DISCONTINUED | OUTPATIENT
Start: 2023-10-05 | End: 2023-10-08 | Stop reason: HOSPADM

## 2023-10-05 RX ORDER — SODIUM CHLORIDE 9 MG/ML
100 INJECTION, SOLUTION INTRAVENOUS CONTINUOUS
Status: DISCONTINUED | OUTPATIENT
Start: 2023-10-05 | End: 2023-10-07

## 2023-10-05 RX ORDER — FENTANYL CITRATE 50 UG/ML
50 INJECTION, SOLUTION INTRAMUSCULAR; INTRAVENOUS
Status: DISCONTINUED | OUTPATIENT
Start: 2023-10-05 | End: 2023-10-05 | Stop reason: HOSPADM

## 2023-10-05 RX ORDER — OXYCODONE HYDROCHLORIDE 5 MG/1
5 TABLET ORAL EVERY 4 HOURS PRN
Qty: 40 TABLET | Refills: 0 | Status: CANCELLED | OUTPATIENT
Start: 2023-10-05

## 2023-10-05 RX ORDER — FENTANYL CITRATE 50 UG/ML
INJECTION, SOLUTION INTRAMUSCULAR; INTRAVENOUS
Status: COMPLETED | OUTPATIENT
Start: 2023-10-05 | End: 2023-10-05

## 2023-10-05 RX ORDER — BUPIVACAINE HYDROCHLORIDE 2.5 MG/ML
INJECTION, SOLUTION EPIDURAL; INFILTRATION; INTRACAUDAL
Status: DISCONTINUED | OUTPATIENT
Start: 2023-10-05 | End: 2023-10-05

## 2023-10-05 RX ADMIN — DROPERIDOL 0.62 MG: 2.5 INJECTION, SOLUTION INTRAMUSCULAR; INTRAVENOUS at 15:47

## 2023-10-05 RX ADMIN — Medication 100 MCG: at 13:23

## 2023-10-05 RX ADMIN — Medication 1000 MG: at 15:48

## 2023-10-05 RX ADMIN — ASPIRIN 81 MG: 81 TABLET, COATED ORAL at 19:12

## 2023-10-05 RX ADMIN — DEXAMETHASONE SODIUM PHOSPHATE 2 MG: 10 INJECTION, SOLUTION INTRAMUSCULAR; INTRAVENOUS at 12:38

## 2023-10-05 RX ADMIN — SUGAMMADEX 200 MG: 100 INJECTION, SOLUTION INTRAVENOUS at 14:54

## 2023-10-05 RX ADMIN — PHENYLEPHRINE HYDROCHLORIDE 0.2 MCG/KG/MIN: 10 INJECTION INTRAVENOUS at 13:43

## 2023-10-05 RX ADMIN — FENTANYL CITRATE 100 MCG: 50 INJECTION, SOLUTION INTRAMUSCULAR; INTRAVENOUS at 12:14

## 2023-10-05 RX ADMIN — HYDROMORPHONE HYDROCHLORIDE 0.5 MG: 1 INJECTION, SOLUTION INTRAMUSCULAR; INTRAVENOUS; SUBCUTANEOUS at 16:00

## 2023-10-05 RX ADMIN — Medication 100 MCG: at 13:26

## 2023-10-05 RX ADMIN — OXYCODONE HYDROCHLORIDE 5 MG: 5 TABLET ORAL at 19:12

## 2023-10-05 RX ADMIN — HYDROMORPHONE HYDROCHLORIDE 0.5 MG: 1 INJECTION, SOLUTION INTRAMUSCULAR; INTRAVENOUS; SUBCUTANEOUS at 06:30

## 2023-10-05 RX ADMIN — HYDROMORPHONE HYDROCHLORIDE 0.5 MG: 1 INJECTION, SOLUTION INTRAMUSCULAR; INTRAVENOUS; SUBCUTANEOUS at 15:47

## 2023-10-05 RX ADMIN — Medication 10 ML: at 11:59

## 2023-10-05 RX ADMIN — PROPOFOL 250 MG: 10 INJECTION, EMULSION INTRAVENOUS at 12:51

## 2023-10-05 RX ADMIN — MIDAZOLAM HYDROCHLORIDE 2 MG: 1 INJECTION, SOLUTION INTRAMUSCULAR; INTRAVENOUS at 12:14

## 2023-10-05 RX ADMIN — ONDANSETRON 4 MG: 2 INJECTION INTRAMUSCULAR; INTRAVENOUS at 14:44

## 2023-10-05 RX ADMIN — SODIUM CHLORIDE, POTASSIUM CHLORIDE, SODIUM LACTATE AND CALCIUM CHLORIDE 9 ML/HR: 600; 310; 30; 20 INJECTION, SOLUTION INTRAVENOUS at 11:59

## 2023-10-05 RX ADMIN — Medication 5 MG: at 22:03

## 2023-10-05 RX ADMIN — SODIUM CHLORIDE 100 ML/HR: 9 INJECTION, SOLUTION INTRAVENOUS at 19:07

## 2023-10-05 RX ADMIN — Medication 10 ML: at 11:58

## 2023-10-05 RX ADMIN — HYDROMORPHONE HYDROCHLORIDE 0.5 MG: 1 INJECTION, SOLUTION INTRAMUSCULAR; INTRAVENOUS; SUBCUTANEOUS at 09:50

## 2023-10-05 RX ADMIN — CEFAZOLIN 3000 MG: 10 INJECTION, POWDER, FOR SOLUTION INTRAVENOUS at 12:50

## 2023-10-05 RX ADMIN — Medication 10 ML: at 22:04

## 2023-10-05 RX ADMIN — BUPIVACAINE HYDROCHLORIDE 30 ML: 2.5 INJECTION, SOLUTION EPIDURAL; INFILTRATION; INTRACAUDAL at 12:23

## 2023-10-05 RX ADMIN — DEXAMETHASONE SODIUM PHOSPHATE 8 MG: 4 INJECTION, SOLUTION INTRAMUSCULAR; INTRAVENOUS at 13:03

## 2023-10-05 RX ADMIN — Medication 50 MCG: at 13:13

## 2023-10-05 RX ADMIN — ATENOLOL 25 MG: 25 TABLET ORAL at 22:03

## 2023-10-05 RX ADMIN — SODIUM CHLORIDE, POTASSIUM CHLORIDE, SODIUM LACTATE AND CALCIUM CHLORIDE: 600; 310; 30; 20 INJECTION, SOLUTION INTRAVENOUS at 15:00

## 2023-10-05 RX ADMIN — Medication 100 MCG: at 13:34

## 2023-10-05 RX ADMIN — FAMOTIDINE 20 MG: 20 TABLET ORAL at 11:58

## 2023-10-05 RX ADMIN — ROCURONIUM BROMIDE 50 MG: 10 SOLUTION INTRAVENOUS at 12:51

## 2023-10-05 RX ADMIN — BACITRACIN 0.9 G: 500 OINTMENT TOPICAL at 22:04

## 2023-10-05 RX ADMIN — QUETIAPINE FUMARATE 200 MG: 100 TABLET ORAL at 22:03

## 2023-10-05 RX ADMIN — LIDOCAINE HYDROCHLORIDE 50 MG: 10 INJECTION, SOLUTION EPIDURAL; INFILTRATION; INTRACAUDAL; PERINEURAL at 12:51

## 2023-10-05 RX ADMIN — BUPIVACAINE HYDROCHLORIDE 30 ML: 2.5 INJECTION, SOLUTION EPIDURAL; INFILTRATION; INTRACAUDAL; PERINEURAL at 12:38

## 2023-10-05 RX ADMIN — CEFAZOLIN SODIUM 2000 MG: 2 INJECTION, SOLUTION INTRAVENOUS at 22:03

## 2023-10-05 NOTE — ANESTHESIA PREPROCEDURE EVALUATION
Anesthesia Evaluation     Patient summary reviewed and Nursing notes reviewed   no history of anesthetic complications:   NPO Solid Status: > 8 hours  NPO Liquid Status: > 2 hours           Airway   Mallampati: III  TM distance: >3 FB  Neck ROM: full  Possible difficult intubation  Dental - normal exam     Pulmonary    (+) asthma,sleep apnea, decreased breath sounds  Cardiovascular   Exercise tolerance: good (4-7 METS)    ECG reviewed  Rhythm: regular  Rate: normal    (+) hypertension well controlled less than 2 medications      Neuro/Psych  (+) psychiatric history Schizophrenia and Bipolar  GI/Hepatic/Renal/Endo    (+) morbid obesity, diabetes mellitus type 2 well controlled    Musculoskeletal     Abdominal   (+) obese   Substance History      OB/GYN          Other                      Anesthesia Plan    ASA 3     general with block     intravenous induction     Anesthetic plan, risks, benefits, and alternatives have been provided, discussed and informed consent has been obtained with: patient.    Plan discussed with CRNA.    CODE STATUS:    Code Status (Patient has no pulse and is not breathing): CPR (Attempt to Resuscitate)  Medical Interventions (Patient has pulse or is breathing): Full Support

## 2023-10-05 NOTE — ANESTHESIA PROCEDURE NOTES
Airway  Urgency: elective    Date/Time: 10/5/2023 12:56 PM  Airway not difficult    General Information and Staff    Patient location during procedure: OR  SRNA: Mayra Hung SRNA  Indications and Patient Condition  Indications for airway management: airway protection    Preoxygenated: yes  MILS not maintained throughout  Mask difficulty assessment: 2 - vent by mask + OA or adjuvant +/- NMBA    Final Airway Details  Final airway type: endotracheal airway      Successful airway: ETT  Cuffed: yes   Successful intubation technique: direct laryngoscopy and video laryngoscopy  Facilitating devices/methods: intubating stylet  Endotracheal tube insertion site: oral  Blade: Anderson  Blade size: 3  ETT size (mm): 7.5  Cormack-Lehane Classification: grade I - full view of glottis  Placement verified by: chest auscultation and capnometry   Cuff volume (mL): 7  Measured from: lips  ETT/EBT  to lips (cm): 22  Number of attempts at approach: 2  Assessment: lips, teeth, and gum same as pre-op and atraumatic intubation    Additional Comments  Negative epigastric sounds, Breath sound equal bilaterally with symmetric chest rise and fall

## 2023-10-05 NOTE — DISCHARGE INSTRUCTIONS
Venancio Blum MD  Orthopedic Foot & Ankle Surgeon  [facility]    Diagnosis: Tibial fracture  Procedure Performed: Procedure(s) (LRB):  ANKLE OPEN REDUCTION INTERNAL FIXATION (Right)  OPEN REDUCTION INTERNAL FIXATION DISTAL RADIUS - LEFT (Left)    What to Expect After Surgery  Diet after surgery:  Resume your diet gradually.  Begin with clear liquids and gradually progress as you feel ready.  Surgical Site Care:  Please remain in your post-operative dressing/splint until your first post op appointment with Dr. Blum.  Please make sure to keep your post-operative dressing clean and dry.  Please use a shower bag (e.g., [email or web address]) when showering or bathing to keep things dry. Wet padding can cause skin breakdown.  Do not stick anything down your cast or splint.  If you sustain a scratch, you are at higher risk for infection as casts and splints harbor more bacteria.  Follow Up Appointment: Please call the [facility] at [phone] or Dr. Blum's schedulers within two (2) days of your discharge to /confirm/verify your follow-up appointment date/time with Dr. Blum.  Typically, Dr. Blum will want to see you 14-21 days after surgery for a post-operative follow-up appointment - before 14 days, the sutures may have to stay in and you will need to return in the 14-21 day window again.  Please arrive ~15 minutes prior to your appointment time to take care of any paperwork.      Activity Precautions:  Elevate the leg above your heart as much as possible for the first two weeks after surgery.  If the leg is higher than your heart, gravity helps decrease swelling, decrease pain, and improve blood flow.  If the leg is below your heart, swelling increases, pain can worsen, and your wound is at greater risk of infection.  Keep all weight off of your surgical leg until cleared by your physician.  Use ice packs intermittently (20 minutes on, 20 minutes off) to reduce  pain and swelling, however, use extreme caution with icing if your block is still in effect.  Make sure to have a barrier between your skin and the ice pack to avoid frost bite.   If you are in a post-operative splint, you can wiggle your toes to help push swelling to your lymph ducts, but do not try to move your ankle.      Pain Management  Nerve Blocks:  to help control pain after surgery, you may have received a nerve block where the anesthesiologist injected numbing medication around the nerves that send pain signals from your foot or ankle to your brain.  This helps you feel little to no pain.   The time a nerve block lasts varies from person to person.  Often, they will last between 12 and 24 hours but could last days.  You may not be able to move your foot and/or ankle during this time.  As the block medication wears off, you may feel a tingling sensation as the nerves start to wake up.  Keep your foot and ankle safe while it is numb.  Avoid excessive heating,  scratching, etc. until the block has completely worn off.  If icing the ankle or foot, watch the toes closely to ensure that they do not become discolored (i.e., white, red or blue)  Even if you still feel no pain in your leg before you go to bed, take a dose of your narcotic medication before you go to sleep.  You do not want to wake up with the block having worn off and being behind on pain control - it is quite difficult to 'catch up' again.  Pain Medication:    Acetaminophen (Tylenol) 500 mg tablets are typically your baseline pain medication.  Take this tablet up to once every 4 hours. Do not exceed 3,000 mg of acetaminophen daily.  You have been provided Oxycodone immediate release tablets as your initial narcotic pain reliever. Take doses of this medication if you are having severe breakthrough pain uncontrolled by the Tylenol alone. Typically, patients are taking it every 4 hours for the first day or two after surgery.  Actively wean down your  use of this medication after the third day after surgery.  Note that it takes about 30-45 minutes for oral pain medication to take effect.  DO NOT drink alcoholic beverages, use recreational drugs, or use prescription sedative medications when taking pain medication.  DO NOT operate heavy machinery/drive while taking opioids.  To avoid the risk of nausea, please make sure that you are taking your medication with food.  You may experience light headedness while taking your pain medication.  Make sure you drink plenty of water while taking narcotic pain medication to stay well hydrated and help avoid constipation.    You have been provided a Docusate prescription to help with constipation that can be a side effect of taking narcotics.  Take that medication as needed.  You have been provided a Zofran prescription to help with nausea that you can take as needed.  Itching is a common side effect to pain medication usage.  If you have an associated rash with the itching, please contact your provider.       When to Call Your Physician  Common or Normal Post-Operative Reactions:  Low grade fever (approximately 100.5 degrees) for up to one week.  Small amount of blood or fluid leaking from the surgical site or dressing  Bruising along the surgical extremity  Swelling around the surgical site and surrounding area  The reactions listed above are NORMAL, but you want to call your surgeon if any of these reactions persist.  Symptoms to Report:  Please report any of the following signs to your surgeon:  Signs of infection:  Redness or pain around your incision (if you cannot see your incision, red streaking up your extremity should be reported).  Thick, dark yellow or foul-smelling drainage at the incision site or from your dressing.  Temperature measured over 101.5 degrees for more than 24 hours despite Tylenol.  Signs of Decreased Circulation to the Ankle and Foot:  Coldness of ankle and foot  Foot or leg turning  pale  Tingling/numbness (after the block has fully resolved)  Sustained increases in pain uncontrolled by medication  Toenail bed turns blue in color  Blood Clots:  Although rare, please be aware and utilize the recommendations below to avoid getting a blood clot.  Prevention of deep vein thrombus DVT (blood clots):  You have been given a prescription for daily Aspirin for 30 days to help prevent blood clot formation.  Get up 4-5 times during the daytime and walk/crutch/walker across the room to keep the blood circulating in your legs. (Maintain any weightbearing restrictions, of course)  Wiggle your toes frequently if you are in a splint or case  Notify your physician if you are a nicotine user, on hormone replacement therapy medications, are taking birth control, or have a history of blood clots as these can increase your risk of developing a blood clot.  Notify your provider if you develop pain or tenderness in your calf muscle    If you have any additional questions, please contact Dr. Blum's staff the [facility] at [phone]    IF YOU HAVE AN EMERGENCY, i.e., SHORTNESS OF BREATH, CHEST PAIN, OR SYMPTOMS SEVERE IN NATURE, PLEASE CALL 911 OR VISIT YOUR LOCAL EMERGENCY ROOM     PATIENT INSTRUCTIONS FOLLOWING HAND SURGERY (splint)  PETEY Medina MD    1. ACTIVITY - Keep your hand elevated above your heart as much as possible for the first 24-48 hours following surgery. This will significantly reduce swelling and discomfort. Elevation is more important than icing. If icing, do not place ice directly on skin. Ice for maximum of 15 minutes on and 15 minutes off. Move your unaffected fingers frequently throughout the day to avoid stiffness. Light use of your hand and fingers is encouraged. Avoid any forceful or repetitive gripping, or heavy lifting. Drive with care, reacting quickly will be somewhat impaired by the bandage and post-operative discomfort. The  determination of whether you can safely drive with one extremity splinted is the decision of a licensed .    2. WOUND CARE - Keep your splint clean and dry. Do not remove. You can shower/bathe. If you shower, the splint must stay covered and dry. If the splint becomes wet, please call the office. The splint will be removed for you at your first post-operative visit.    3. MEDICATIONS - Please resume taking your usual medications. For pain you may alternate with over-the-counter Tylenol (325mg or 500mg) and Motrin (400mg) if you can tolerate them. You may be prescribed a short course of oral narcotics (Oxycodone or Tramadol) should you need them. If you are taking a prescribed pain medication, you should not drive, operate machinery, power tools, or drink any alcoholic beverages.    4. ANESTHESIA - Due to the lasting effects of anesthesia, we recommend you do not make any important decisions for 24 hours.    5. FOLLOW-UP - You should already have a follow-up appointment scheduled with Dr. Medina in 1-2 weeks. If there are any questions or problems, please call the office at [phone] ([facility] Office) or [phone] ([facility] Office).    IF YOU HAVE ANY OF THE FOLLOWING, CONTACT THE OFFICE:  ú Fever of 101ø or above  ú Redness, warmth in the hand or arm that doesn't improve with ice and elevation  ú Foul smelling drainage from bandage/splint  ú Pain that worsens despite pain medication  ú Persistent nausea or vomiting into the next day after surgery  ú Bleeding or continuous oozing that saturates the bandage and does not stop after applying firm pressure to wound for 10 minutes  ú Increased swelling of fingers or severe tightness of bandage not relieved  ú Increased numbness and tingling in the hand/fingers    COMMON FINDINGS:  ú Bruising into the hand/ fingers and sometimes even into the arm is normal  ú Swelling is going to be present for at least the next 2-3 weeks    In case of an emergency in which  you cannot reach your physician, please go directly to the nearest hospital emergency room department.

## 2023-10-05 NOTE — PLAN OF CARE
Abrasion to RUE / localized rash to LLE noted. Antibiotic ointment applied. Left digits wiggle with rapid cap refill. Dorsil/planter flexion equally moderate. SBP significantly elevated. Scheduled Atenolol administered. SR in 80s on cardiac mx. Patient denies pain/discomfort at this time. Call light in reach.
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
## 2023-10-05 NOTE — ANESTHESIA PROCEDURE NOTES
Pop Catheter      Patient reassessed immediately prior to procedure    Patient location during procedure: pre-op  Reason for block: at surgeon's request and post-op pain management  Performed by  CRNA/CAA: Demar Araujo, CRNA  Assisted by: Leilani Holm RN  Preanesthetic Checklist  Completed: patient identified, IV checked, site marked, risks and benefits discussed, surgical consent, monitors and equipment checked, pre-op evaluation and timeout performed  Prep:  Sterile barriers:cap, gloves, mask and washed/disinfected hands  Prep: ChloraPrep  Patient monitoring: blood pressure monitoring, continuous pulse oximetry and EKG  Procedure    Sedation: yes  Performed under: local infiltration  Guidance:ultrasound guided    ULTRASOUND INTERPRETATION.  Using ultrasound guidance a 20 G gauge needle was placed in close proximity to the nerve, at which point, under ultrasound guidance anesthetic was injected in the area of the nerve and spread of the anesthesia was seen on ultrasound in close proximity thereto.  There were no abnormalities seen on ultrasound; a digital image was taken; and the patient tolerated the procedure with no complications. Images:still images obtained, printed/placed on chart    Block Type:popliteal  Injection Technique:catheter  Needle Type:echogenic and Tuohy  Needle Gauge:18 G  Resistance on Injection: none  Catheter Size:20 G  Cath Depth at skin: 10 cm    Medications Used: bupivacaine PF (MARCAINE) 0.25 % injection - Injection   30 mL - 10/5/2023 12:23:00 PM  fentaNYL citrate (PF) (SUBLIMAZE) injection - Intravenous   100 mcg - 10/5/2023 12:14:00 PM  midazolam (VERSED) injection - Intravenous   2 mg - 10/5/2023 12:14:00 PM      Medications  Preservative Free Saline:5ml    Post Assessment  Injection Assessment: negative aspiration for heme, no paresthesia on injection and incremental injection  Patient Tolerance:comfortable throughout block  Complications:no  Additional Notes  CATHETER            "                    A high-frequency linear transducer, with sterile cover, was placed in the popliteal fossa to identify the popliteal artery and vein, Tibial nerve (TN) and Common Peroneal nerve (CP). The transducer was then moved in a cephalad fashion to observe the TN and CP nerve bifurcation to form the Sciatic Nerve. The insertion site was prepped and draped in sterile fashion. Skin and cutaneous tissue was infiltrated with 2-5 ml of 1% Lidocaine. Using ultrasound-guidance, an 18-gauge Contiplex Ultra 360 Touhy needle was advanced in plane from lateral to medial. Preservative-free normal saline was utilized for hydro-dissection of tissue, advancement of Touhy needle, and to confirm final needle placement posterior to the nerves. Local anesthetic injection spread, in incremental 3-5 ml injections, to surround both nerve structures. Aspiration every 5 ml to prevent intravascular injection. Injection was completed with negative aspiration of blood and negative intravascular injection. Injection pressures were normal with minimal resistance. A 20-gauge Contiplex Echo catheter was placed through the needle and advance out the tip of the Touhy 1-3 cm. The Touhy needle was then removed, and final catheter position verified (Below/above or Anterior/Posterior) the nerve structures. The catheter was secured in the usual fashion with skin glue, benzoin, steri-strips, CHG tegaderm and Label noting \"Nerve Block Catheter\". Jerk tape applied at yellow connector and catheter connection.            "

## 2023-10-05 NOTE — ANESTHESIA POSTPROCEDURE EVALUATION
Patient: Stef Grey    Procedure Summary       Date: 10/05/23 Room / Location:  [facility]    Anesthesia Start: 1244 Anesthesia Stop: 1509    Procedures:       ANKLE OPEN REDUCTION INTERNAL FIXATION (Right: Ankle)      OPEN REDUCTION INTERNAL FIXATION DISTAL RADIUS - LEFT (Left: Elbow) Diagnosis:       Tibial fracture      (Tibial fracture [S82.209A])    Surgeons: Venancio Blum MD; Kevin Medina MD Provider: Steven Conner MD    Anesthesia Type: general with block ASA Status: 3            Anesthesia Type: general with block    Vitals  No vitals data found for the desired time range.          Post Anesthesia Care and Evaluation    Patient location during evaluation: PACU  Patient participation: complete - patient participated  Level of consciousness: sleepy but conscious  Pain score: 0  Pain management: adequate    Airway patency: patent  Anesthetic complications: No anesthetic complications  PONV Status: none  Cardiovascular status: hemodynamically stable and acceptable  Respiratory status: nonlabored ventilation, acceptable, nasal cannula, spontaneous ventilation and oral airway  Hydration status: acceptable
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
## 2023-10-05 NOTE — ANESTHESIA PROCEDURE NOTES
SS ACB      Patient reassessed immediately prior to procedure    Reason for block: at surgeon's request and post-op pain management  Performed by  Assisted by: Leilani Holm RN  Preanesthetic Checklist  Completed: patient identified, IV checked, site marked, risks and benefits discussed, surgical consent, monitors and equipment checked, pre-op evaluation and timeout performed  Prep:  Pt Position: supine  Sterile barriers:cap, gloves, mask, sterile barriers and washed/disinfected hands  Prep: ChloraPrep  Patient monitoring: blood pressure monitoring, continuous pulse oximetry and EKG  Procedure  Performed under: spinal  Guidance:ultrasound guided    ULTRASOUND INTERPRETATION.  Using ultrasound guidance a 20 G gauge needle was placed in close proximity to the nerve, at which point, under ultrasound guidance anesthetic was injected in the area of the nerve and spread of the anesthesia was seen on ultrasound in close proximity thereto.  There were no abnormalities seen on ultrasound; a digital image was taken; and the patient tolerated the procedure with no complications. Images:still images obtained, printed/placed on chart    Laterality:right  Block Type:adductor canal block  Injection Technique:single-shot  Needle Type:Tuohy and echogenic  Needle Gauge:18 G  Resistance on Injection: none  Catheter Size:20 G (20g)          Post Assessment  Injection Assessment: negative aspiration for heme, incremental injection and no paresthesia on injection  Patient Tolerance:comfortable throughout block  Complications:no  Additional Notes  SINGLE shot   A high-frequency linear transducer, with sterile cover, was placed on the anterior mid-thigh (between the anterior superior iliac spine and patella). The transducer was then moved medially to identify the Sartorius muscle (Geronimo), Vastus Medialis muscle (VMM), Superficial Femoral Artery (SFA) and Vein. The transducer was then moved cephalad or caudad to position the SFA in the  "middle of the Geronimo. The insertion site was prepped and draped in sterile fashion. Skin and cutaneous tissue was infiltrated with 2-5 ml of 1% Lidocaine. Using ultrasound-guidance, a 20-gauge B-Aguilar 4\" Ultraplex 360 non-stimulating echogenic needle was advanced in plane from lateral to medial. Preservative-free normal saline was utilized for hydro-dissection of tissue, advancement of needle, and to confirm needle placement below the fascial plane of the Geronimo where the Nerve to the VMM is located. Local anesthetic (LA) 5 ml deposited here. The needle continues its path lateral to the SFA at the level of the Saphenous Nerve. The remainder of the LA was deposited at the 10-11 o'clock position of the SFA. This injection created a space between the Geronimo and the SFA. Aspiration every 5 ml to prevent intravascular injection. Injection was completed with negative aspiration of blood and negative intravascular injection. Injection pressures were normal with minimal resistance.           "

## 2023-10-05 NOTE — CASE MANAGEMENT/SOCIAL WORK
Continued Stay Note  [facility]     Patient Name: Stef Grey  MRN: [id number]  Today's Date: 10/5/2023    Admit Date: 10/2/2023    Plan: Possible rehab   Discharge Plan       Row Name 10/05/23 1541       Plan    Plan Possible rehab    Plan Comments Referral given to case management for rehab needs which I will address with patient once PT and OT has seen.    Final Discharge Disposition Code 30 - still a patient                   Discharge Codes    No documentation.                 Expected Discharge Date and Time       Expected Discharge Date Expected Discharge Time    Oct 9, 2023               Lashay Caraballo RN

## 2023-10-05 NOTE — PROGRESS NOTES
"          Orthopaedic Surgery Progress Note    LOS: 0 days   Patient Care Team:  Sam Shepard MD as PCP - General (Family Medicine)      Procedure(s):  ANKLE OPEN REDUCTION INTERNAL FIXATION  OPEN REDUCTION INTERNAL FIXATION DISTAL RADIUS - LEFT  Subjective   Interval History:   Resting comfortably in bed, pain well controlled, planning for OR later today, NPO for OR    Objective     Vital Signs:  Temp (24hrs), Av.4 øF (36.9 øC), Min:97.8 øF (36.6 øC), Max:99.1 øF (37.3 øC)    /74 (BP Location: Left leg, Patient Position: Lying)   Pulse 73   Temp 97.8 øF (36.6 øC) (Oral)   Resp 18   Ht 167.6 cm (66\")   Wt 133 kg (293 lb 9.6 oz)   SpO2 99%   BMI 47.39 kg/mý   Body mass index is 47.39 kg/mý.    Labs:  Lab Results (last 24 hours)       ** No results found for the last 24 hours. **            Physical Exam:  right LE: compartments soft/compressible   +EHL/FHL/GSC/TA   SILT DP/SP/SN/Saph/Tib   Palpable DP, CR brisk      Assessment/Plan:    status post:  Procedure(s):  ANKLE OPEN REDUCTION INTERNAL FIXATION  OPEN REDUCTION INTERNAL FIXATION DISTAL RADIUS - LEFT    -NWB RLE  -NPO for OR today  -Pain control  -Rest of mgmt per primary team    Venancio Blum MD  10/05/23  09:43 EDT       "

## 2023-10-05 NOTE — OP NOTE
ORTHOPAEDIC SURGERY OPERATIVE REPORT    Location of Surgery: [facility]    Patient Name:  Stef Grey  YOB: 1990    Date of Surgery:  10/5/2023     Pre-op Diagnosis:   Right ankle fracture    Post-op Diagnosis:      Post-Op Diagnosis Codes:  Same    Procedure/CPTr Codes:  1. ORIF Syndesmosis, 83881-37  2. Stress fluoroscopy ankle 01203-28  3. Application short leg splint, 72064-18     Staff:  Surgeon(s):  Venancio Blum MD    Anesthesia: General with Block    Estimated Blood Loss: minimal    Specimen:          None    Complications:   None    CLINICAL HISTORY:  Stef Grey is a 33 y.o. male with the above condition. Discussed operative and non-operative treatment options and risks including but not limited to pain, infection, bleeding, damage to surrounding structures, and need for additional procedures.  After all questions were fully answered, Stef Grey understood the risks and elected to proceed, and informed consent was obtained. The patient was marked with indelible marking pen after verifying correct side, site, and procedure.      DESCRIPTION OF PROCEDURE:   The patient was identified in the pre-operative area where correct procedure, site, and patient were verified. The patient was brought to the operating theater in stable condition and was transferred to the operative table where they remained in the supine position for the entirety of the case. Preoperative timeout was taken to ensure the correct identity, operative procedure, and location. General anesthesia was then administered. The patient received appropriate weight based IV antibiotics within 30 minutes of skin incision.  All bony prominences well-padded. The right leg was then prepped and draped in the standard sterile fashion. After Esmarch exsanguination, the tourniquet was inflated.     A longitudinal incision was marked out on the lateral aspect of the leg.  A 15 blade scalpel was then  used to incise skin.  Next, the tenotomy scissors were used to dissect through the soft tissue careful to protect all of the neurovascular structures.  Hemostasis was obtained throughout dissection. Once the periosteum of the fibula was identified, a 15 blade was then used to elevate the tissue anteriorly and posteriorly on the fibula.  The syndesmosis was then debrided.  A stress test performed and it was confirmed that the syndesmosis was unstable.  Next a small 4 hole plate was placed and position confirmed on xray.  The plate was secured proximally to the fibula with one 3.5 millimeter screw.  Next a large clamp was placed across the malleoli and the syndesmosis was confirmed to be reduced as could be visualized through the open wound.  Next two 3.5 mm screws were placed across the syndesmosis.     Final x-rays were obtained and then a external rotation test was performed under fluoroscopy.  There was no instability of the syndesmosis..     The wounds were copiously irrigated with sterile saline prior to closure.  The tourniquet was deflated and hemostasis was achieved with electrocautery.  The deep tissue was closed with 3-0 Monocryl, the skin was closed with 3-0 Nylon, and a sterile dressing was placed.     Next a well-padded splint was applied with a posterior mold and stirrup. The ankle was splinted in neutral.       The patient tolerated the procedure well no with acute complications identified.  All sponge & needle counts were correct x2 prior to procedure conclusion.  Patient was awoken from anesthesia and transferred back to the PACU without complication.     Counts:    Sponge: Correct    Needle: Correct    Sharp: Correct    Instrument: Correct     POSTOPERATIVE PLAN:   -Transfer back to floor  -NWB RLE  -Advance diet as tolerated  -Keep splint/operative dressing clean and dry  -DVT prophylaxis, mechanical and ASA, home on ASA  -Postoperative antibiotics  -Pain control  -PT/OT  -Elevate operative  extremity  -Medicine primary, appreciate recs  -Disp: home vs. SNF    Following discharge. Patient is to be NWB in splint. Plan to see patient in clinic in 2-3 weeks for postop follow up. Plan for suture removal at 2-3 week follow up visit. 3 view simulated WB X-rays of ankle at follow up visit (splint removed for xray). Patient to be placed in NWB cast at first postop visit and remain NWB for 6 additional weeks. See patient again at 8 weeks postop for wound check and XRs (3 view WB X-rays of ankle) and placement in tall CAM boot. Starting at 8 weeks post-op, patient can begin WBAT in tall CAM boot for an additional 4 weeks. Plan for physical therapy to start at 8 weeks postop (ANKLE FRACTURE ORIF REHAB PROTOCOL SLOW). At 12 weeks transition into supportive shoewear with brace.     Implants:    Arthrex 4 hole plate and 3.5 mm screws    Venancio Blum MD     Date: 10/5/2023  Time: 10:05 EDT  Electronically signed by Venancio Blum MD, 10/05/23, 10:05 AM EDT.

## 2023-10-05 NOTE — ANESTHESIA PROCEDURE NOTES
Popliteal Catheter    Pre-sedation assessment completed: 10/5/2023 12:03 PM    Patient reassessed immediately prior to procedure    Patient location during procedure: pre-op  Start time: 10/5/2023 12:03 PM  Reason for block: at surgeon's request and post-op pain management  Performed by  CRNA/CAA: Demar Araujo, CRNA  Assisted by: Leilani Holm RN  Preanesthetic Checklist  Completed: patient identified, IV checked, site marked, risks and benefits discussed, surgical consent, monitors and equipment checked, pre-op evaluation and timeout performed  Prep:  Sterile barriers:cap, gloves, mask and washed/disinfected hands  Prep: ChloraPrep  Patient monitoring: blood pressure monitoring, continuous pulse oximetry and EKG  Procedure    Sedation: yes  Performed under: local infiltration  Guidance:ultrasound guided    ULTRASOUND INTERPRETATION.  Using ultrasound guidance a 20 G gauge needle was placed in close proximity to the nerve, at which point, under ultrasound guidance anesthetic was injected in the area of the nerve and spread of the anesthesia was seen on ultrasound in close proximity thereto.  There were no abnormalities seen on ultrasound; a digital image was taken; and the patient tolerated the procedure with no complications. Images:still images obtained, printed/placed on chart    Laterality:right  Block Type:popliteal  Injection Technique:catheter  Needle Type:echogenic and Tuohy  Needle Gauge:18 G  Resistance on Injection: none  Catheter Size:20 G  Cath Depth at skin: 9 cm    Medications Used: fentaNYL citrate (PF) (SUBLIMAZE) injection - Intravenous   100 mcg - 10/5/2023 12:04:00 PM  midazolam (VERSED) injection - Intravenous   2 mg - 10/5/2023 12:04:00 PM  bupivacaine PF (MARCAINE) 0.25 % injection - Injection   30 mL - 10/5/2023 12:25:00 PM      Medications  Preservative Free Saline:5ml    Post Assessment  Injection Assessment: negative aspiration for heme, no paresthesia on injection and incremental  "injection  Patient Tolerance:comfortable throughout block  Complications:no  Additional Notes  CATHETER                               A high-frequency linear transducer, with sterile cover, was placed in the popliteal fossa to identify the popliteal artery and vein, Tibial nerve (TN) and Common Peroneal nerve (CP). The transducer was then moved in a cephalad fashion to observe the TN and CP nerve bifurcation to form the Sciatic Nerve. The insertion site was prepped and draped in sterile fashion. Skin and cutaneous tissue was infiltrated with 2-5 ml of 1% Lidocaine. Using ultrasound-guidance, an 18-gauge Contiplex Ultra 360 Touhy needle was advanced in plane from lateral to medial. Preservative-free normal saline was utilized for hydro-dissection of tissue, advancement of Touhy needle, and to confirm final needle placement posterior to the nerves. Local anesthetic injection spread, in incremental 3-5 ml injections, to surround both nerve structures. Aspiration every 5 ml to prevent intravascular injection. Injection was completed with negative aspiration of blood and negative intravascular injection. Injection pressures were normal with minimal resistance. A 20-gauge Contiplex Echo catheter was placed through the needle and advance out the tip of the Touhy 1-3 cm. The Touhy needle was then removed, and final catheter position verified (Below/above or Anterior/Posterior) the nerve structures. The catheter was secured in the usual fashion with skin glue, benzoin, steri-strips, CHG tegaderm and Label noting \"Nerve Block Catheter\". Jerk tape applied at yellow connector and catheter connection.            "

## 2023-10-05 NOTE — TELEPHONE ENCOUNTER
Called patient to notify FMLA paperwork was completed and faxed. Advised I would mail forms to home address as well.

## 2023-10-05 NOTE — ANESTHESIA PROCEDURE NOTES
SS Infraclav      Patient reassessed immediately prior to procedure    Patient location during procedure: pre-op  Reason for block: at surgeon's request and post-op pain management  Performed by  CRNA/CAA: Demar Araujo, CRNA  Assisted by: Leilani Holm RN  Preanesthetic Checklist  Completed: patient identified, IV checked, site marked, risks and benefits discussed, surgical consent, monitors and equipment checked, pre-op evaluation and timeout performed  Prep:  Pt Position: supine  Sterile barriers:cap, gloves, mask and washed/disinfected hands  Prep: ChloraPrep  Patient monitoring: blood pressure monitoring, continuous pulse oximetry and EKG  Procedure    Sedation: yes  Performed under: local infiltration  Guidance:ultrasound guided    ULTRASOUND INTERPRETATION.  Using ultrasound guidance a 20 G gauge needle was placed in close proximity to the brachial plexus nerve, at which point, under ultrasound guidance anesthetic was injected in the area of the nerve and spread of the anesthesia was seen on ultrasound in close proximity thereto.  There were no abnormalities seen on ultrasound; a digital image was taken; and the patient tolerated the procedure with no complications. Images:still images obtained, printed/placed on chart    Laterality:left  Block Type:infraclavicular  Injection Technique:single-shot  Needle Type:Tuohy and echogenic  Needle Gauge:18 G  Resistance on Injection: none  Catheter Size:20 G    Medications Used: dexamethasone sodium phosphate injection - Injection   2 mg - 10/5/2023 12:38:00 PM  bupivacaine PF (MARCAINE) 0.25 % injection - Injection   30 mL - 10/5/2023 12:38:00 PM      Medications  Preservative Free Saline:5ml    Post Assessment  Injection Assessment: negative aspiration for heme, no paresthesia on injection and incremental injection  Patient Tolerance:comfortable throughout block  Complications:no  Additional Notes  SINGLE shot   The affected upper extremity was abducted and  "rotated 90 degrees at the shoulder, within the patients range of motion. A high-frequency linear transducer, with sterile cover, was placed just medial to the coracoid process, distal third of the clavicle, and inferior to the clavicle. Keeping the transducer is in a parasagittal plane (cephalad to caudad), scanning medially to laterally. The Pectoralis major and minor, brachial plexus, axillary artery and vein, rib and pleura where visualized. The insertion site was prepped and draped in sterile fashion. Skin and cutaneous tissue was infiltrated with 2-5 ml of 1% Lidocaine. Using ultrasound-guidance, a 20-gauge B-Aguilar 4\" Ultraplex 360 non-stimulating echogenic needle was then inserted and advanced in-plane lateral to the axillary artery and lateral cord of the brachial plexus. Preservative-free normal saline was utilized for hydro-dissection of tissue, advancement of needle, and to confirm final needle placement posterior to the axillary artery and posterior cord of the brachial plexus. Local anesthetic injection spread, in incremental 3-5 ml injections, was confirmed. Aspiration every 5 ml to prevent intravascular injection. Injection was completed with negative aspiration of blood and negative intravascular injection. Injection pressures were normal with minimal resistance.            "

## 2023-10-06 LAB
ANION GAP SERPL CALCULATED.3IONS-SCNC: 8 MMOL/L (ref 5–15)
BASOPHILS # BLD AUTO: 0.04 10*3/MM3 (ref 0–0.2)
BASOPHILS NFR BLD AUTO: 0.3 % (ref 0–1.5)
BUN SERPL-MCNC: 17 MG/DL (ref 6–20)
BUN/CREAT SERPL: 23.9 (ref 7–25)
CALCIUM SPEC-SCNC: 9.4 MG/DL (ref 8.6–10.5)
CHLORIDE SERPL-SCNC: 105 MMOL/L (ref 98–107)
CO2 SERPL-SCNC: 27 MMOL/L (ref 22–29)
CREAT SERPL-MCNC: 0.71 MG/DL (ref 0.76–1.27)
DEPRECATED RDW RBC AUTO: 41.7 FL (ref 37–54)
EGFRCR SERPLBLD CKD-EPI 2021: 125.8 ML/MIN/1.73
EOSINOPHIL # BLD AUTO: 0.01 10*3/MM3 (ref 0–0.4)
EOSINOPHIL NFR BLD AUTO: 0.1 % (ref 0.3–6.2)
ERYTHROCYTE [DISTWIDTH] IN BLOOD BY AUTOMATED COUNT: 13.5 % (ref 12.3–15.4)
GLUCOSE SERPL-MCNC: 119 MG/DL (ref 65–99)
HCT VFR BLD AUTO: 41.3 % (ref 37.5–51)
HGB BLD-MCNC: 13.4 G/DL (ref 13–17.7)
IMM GRANULOCYTES # BLD AUTO: 0.22 10*3/MM3 (ref 0–0.05)
IMM GRANULOCYTES NFR BLD AUTO: 1.5 % (ref 0–0.5)
LYMPHOCYTES # BLD AUTO: 1.21 10*3/MM3 (ref 0.7–3.1)
LYMPHOCYTES NFR BLD AUTO: 8.2 % (ref 19.6–45.3)
MCH RBC QN AUTO: 27.3 PG (ref 26.6–33)
MCHC RBC AUTO-ENTMCNC: 32.4 G/DL (ref 31.5–35.7)
MCV RBC AUTO: 84.3 FL (ref 79–97)
MONOCYTES # BLD AUTO: 1.02 10*3/MM3 (ref 0.1–0.9)
MONOCYTES NFR BLD AUTO: 6.9 % (ref 5–12)
NEUTROPHILS NFR BLD AUTO: 12.2 10*3/MM3 (ref 1.7–7)
NEUTROPHILS NFR BLD AUTO: 83 % (ref 42.7–76)
NRBC BLD AUTO-RTO: 0 /100 WBC (ref 0–0.2)
PLATELET # BLD AUTO: 203 10*3/MM3 (ref 140–450)
PMV BLD AUTO: 12.7 FL (ref 6–12)
POTASSIUM SERPL-SCNC: 4.5 MMOL/L (ref 3.5–5.2)
RBC # BLD AUTO: 4.9 10*6/MM3 (ref 4.14–5.8)
SODIUM SERPL-SCNC: 140 MMOL/L (ref 136–145)
WBC NRBC COR # BLD: 14.7 10*3/MM3 (ref 3.4–10.8)

## 2023-10-06 PROCEDURE — 25010000002 CEFAZOLIN IN DEXTROSE 2-4 GM/100ML-% SOLUTION: Performed by: ORTHOPAEDIC SURGERY

## 2023-10-06 PROCEDURE — 85025 COMPLETE CBC W/AUTO DIFF WBC: CPT | Performed by: FAMILY MEDICINE

## 2023-10-06 PROCEDURE — 80048 BASIC METABOLIC PNL TOTAL CA: CPT | Performed by: FAMILY MEDICINE

## 2023-10-06 PROCEDURE — G0378 HOSPITAL OBSERVATION PER HR: HCPCS

## 2023-10-06 PROCEDURE — 97162 PT EVAL MOD COMPLEX 30 MIN: CPT

## 2023-10-06 PROCEDURE — 97535 SELF CARE MNGMENT TRAINING: CPT

## 2023-10-06 PROCEDURE — 97166 OT EVAL MOD COMPLEX 45 MIN: CPT

## 2023-10-06 PROCEDURE — 25810000003 SODIUM CHLORIDE 0.9 % SOLUTION: Performed by: ORTHOPAEDIC SURGERY

## 2023-10-06 RX ORDER — ACETAMINOPHEN 500 MG
1000 TABLET ORAL EVERY 8 HOURS
Status: DISCONTINUED | OUTPATIENT
Start: 2023-10-06 | End: 2023-10-08 | Stop reason: HOSPADM

## 2023-10-06 RX ADMIN — OXYCODONE HYDROCHLORIDE 5 MG: 5 TABLET ORAL at 18:08

## 2023-10-06 RX ADMIN — Medication 10 ML: at 19:39

## 2023-10-06 RX ADMIN — ACETAMINOPHEN 1000 MG: 500 TABLET ORAL at 11:37

## 2023-10-06 RX ADMIN — SODIUM CHLORIDE 100 ML/HR: 9 INJECTION, SOLUTION INTRAVENOUS at 05:56

## 2023-10-06 RX ADMIN — ACETAMINOPHEN 1000 MG: 500 TABLET ORAL at 18:08

## 2023-10-06 RX ADMIN — CEFAZOLIN SODIUM 2000 MG: 2 INJECTION, SOLUTION INTRAVENOUS at 05:56

## 2023-10-06 RX ADMIN — ASPIRIN 81 MG: 81 TABLET, COATED ORAL at 08:40

## 2023-10-06 RX ADMIN — BACITRACIN 0.9 G: 500 OINTMENT TOPICAL at 08:44

## 2023-10-06 RX ADMIN — QUETIAPINE FUMARATE 200 MG: 100 TABLET ORAL at 19:37

## 2023-10-06 RX ADMIN — ATENOLOL 25 MG: 25 TABLET ORAL at 08:40

## 2023-10-06 RX ADMIN — OXYCODONE HYDROCHLORIDE 5 MG: 5 TABLET ORAL at 08:40

## 2023-10-06 RX ADMIN — BACITRACIN 0.9 G: 500 OINTMENT TOPICAL at 19:38

## 2023-10-06 RX ADMIN — Medication 10 ML: at 11:39

## 2023-10-06 RX ADMIN — ATENOLOL 25 MG: 25 TABLET ORAL at 19:37

## 2023-10-06 NOTE — PLAN OF CARE
Goal Outcome Evaluation:  Plan of Care Reviewed With: patient        Progress: no change (OT IE)  Outcome Evaluation: OT evaluation completed. Pt presents w/ decreased I in ADLs, related t/fs, mobility compared to PLOF limietd by decreased activity tolerance, impaired balance, muscle weakness at BUEs and immobilization and weiht bearing limitations s/p sx at LUE and RLE. Pt grossly min A x 1-2 for bed mobility, mod A x 2 for STS w/ platform wx, mod A for UBD and upward of dep for LBD following education on optimal position, tech, seq and recommendation for potential AE use in future sessions. Progressive mobility limited by elevated BP this date as indicated in vitals w/ RN request to return to bed. Pt is below occupational performance baseline and would benefit from IPOT POC and IRF at d/c when medically ready.      Anticipated Discharge Disposition (OT): inpatient rehabilitation facility

## 2023-10-06 NOTE — PROGRESS NOTES
[place]    Acute pain service Inpatient Progress Note    Patient Name: Stef Grey  :  1990  MRN:  [id number]        Acute Pain  Service Inpatient Progress Note:    Analgesia:Excellent  Pain Score:10  LOC: alert and awake  Resp Status: supplemental oxygen and room air  Cardiac: VS stable  Side Effects:None  Catheter Site:clean, dry and dressing intact  Cath type: peripheral nerve cath(InfuSystem)  Infusion rate: Ext/Pop: Basal: 1ml/hr, PIB: 5ml q 2 h, PCA: 5 ml q 30 min  Catheter Plan:Catheter to remain Insitu and Continue catheter infusion rate unchanged

## 2023-10-06 NOTE — PROGRESS NOTES
"          Orthopaedic Surgery Progress Note    LOS: 0 days   Patient Care Team:  Sam Shepard MD as PCP - General (Family Medicine)  1 Day Post-Op   Procedure(s):  ANKLE OPEN REDUCTION INTERNAL FIXATION  OPEN REDUCTION INTERNAL FIXATION DISTAL RADIUS - LEFT  Subjective   Interval History:   Resting comfortably in bed, pain well controlled, no acute events overnight, no complaints    Objective     Vital Signs:  Temp (24hrs), Av.9 øF (36.6 øC), Min:97 øF (36.1 øC), Max:99 øF (37.2 øC)    /72 (BP Location: Left leg, Patient Position: Lying)   Pulse 91   Temp 98.8 øF (37.1 øC) (Oral)   Resp 18   Ht 167.6 cm (66\")   Wt 133 kg (293 lb 9.6 oz)   SpO2 97%   BMI 47.39 kg/mý   Body mass index is 47.39 kg/mý.    Labs:  Lab Results (last 24 hours)       Procedure Component Value Units Date/Time    Basic Metabolic Panel [[id number]]  (Abnormal) Collected: 10/06/23 0501    Specimen: Blood Updated: 10/06/23 0639     Glucose 119 mg/dL      BUN 17 mg/dL      Creatinine 0.71 mg/dL      Sodium 140 mmol/L      Potassium 4.5 mmol/L      Chloride 105 mmol/L      CO2 27.0 mmol/L      Calcium 9.4 mg/dL      BUN/Creatinine Ratio 23.9     Anion Gap 8.0 mmol/L      eGFR 125.8 mL/min/1.73     Narrative:      GFR Normal >60  Chronic Kidney Disease <60  Kidney Failure <15      CBC & Differential [[id number]]  (Abnormal) Collected: 10/06/23 0501    Specimen: Blood Updated: 10/06/23 0617    Narrative:      The following orders were created for panel order CBC & Differential.  Procedure                               Abnormality         Status                     ---------                               -----------         ------                     CBC Auto Differential[[id number]]        Abnormal            Final result                 Please view results for these tests on the individual orders.    CBC Auto Differential [[id number]]  (Abnormal) Collected: 10/06/23 0501    Specimen: Blood Updated: 10/06/23 0617     WBC " 14.70 10*3/mm3      RBC 4.90 10*6/mm3      Hemoglobin 13.4 g/dL      Hematocrit 41.3 %      MCV 84.3 fL      MCH 27.3 pg      MCHC 32.4 g/dL      RDW 13.5 %      RDW-SD 41.7 fl      MPV 12.7 fL      Platelets 203 10*3/mm3      Neutrophil % 83.0 %      Lymphocyte % 8.2 %      Monocyte % 6.9 %      Eosinophil % 0.1 %      Basophil % 0.3 %      Immature Grans % 1.5 %      Neutrophils, Absolute 12.20 10*3/mm3      Lymphocytes, Absolute 1.21 10*3/mm3      Monocytes, Absolute 1.02 10*3/mm3      Eosinophils, Absolute 0.01 10*3/mm3      Basophils, Absolute 0.04 10*3/mm3      Immature Grans, Absolute 0.22 10*3/mm3      nRBC 0.0 /100 WBC             Physical Exam:  right LE: compartments soft/compressible around splint   +EHL/FHL   SILT in toes although decreased 2/2 to nerve catheter   Palpable DP beneath splint, brisk cap refill in all toes    Assessment/Plan:  1 Day Post-Op status post:  Procedure(s):  ANKLE OPEN REDUCTION INTERNAL FIXATION  OPEN REDUCTION INTERNAL FIXATION DISTAL RADIUS - LEFT    -NWB operative lower extremity  -Advance diet as tolerated  -Keep splint/operative dressing clean and dry  -DVT prophylaxis, mechanical and ASA, home on ASA  -Postoperative antibiotics  -Pain control  -PT/OT  -Elevate operative extremity  -Dispo, home vs. SNF  -Rest of mgmt per primary team    Venancio Blum MD  10/06/23  07:53 EDT
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
## 2023-10-06 NOTE — PLAN OF CARE
Goal Outcome Evaluation:  Plan of Care Reviewed With: patient        Progress: no change  Outcome Evaluation: PT initial eval completed. Pt presents well below his functional baseline secondary to generalized weakness, NWB status of multiple extremities, impaired balance, and decreased activity tolerance. Pt required min A for bed mobility. NWB status of LUE and RLE discussed, pt verbalized understanding. Ortho cleared pt for use of platform walker with LUE. STS performed with mod Ax2 and LUE platform walker. Further mobility limited by hypertension and RN requested pt return to bed. Further IPPT is warrented. PT rec d/c to IPR for best functional outcomes.      Anticipated Discharge Disposition (PT): inpatient rehabilitation facility

## 2023-10-06 NOTE — CASE MANAGEMENT/SOCIAL WORK
Continued Stay Note  [facility]     Patient Name: Stef Grey  MRN: [id number]  Today's Date: 10/6/2023    Admit Date: 10/2/2023    Plan: [facility]   Discharge Plan       Row Name 10/06/23 1118       Plan    Plan [facility]    Plan Comments I spoke with patient in regards to discharge planning as rehab recommendations have been made. He resides with parents in [place]. No use of any DME. His PCP is GENARO Shepard. Referral given to April with [facility]. If accepted, they will initiate insurance precert today.    Final Discharge Disposition Code 62 - inpatient rehab facility                   Discharge Codes    No documentation.                 Expected Discharge Date and Time       Expected Discharge Date Expected Discharge Time    Oct 9, 2023               Lashay Caraballo RN
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
## 2023-10-07 PROCEDURE — G0378 HOSPITAL OBSERVATION PER HR: HCPCS

## 2023-10-07 PROCEDURE — 97116 GAIT TRAINING THERAPY: CPT

## 2023-10-07 RX ORDER — ATENOLOL 50 MG/1
50 TABLET ORAL 2 TIMES DAILY
Status: DISCONTINUED | OUTPATIENT
Start: 2023-10-07 | End: 2023-10-08 | Stop reason: HOSPADM

## 2023-10-07 RX ADMIN — BACITRACIN 0.9 G: 500 OINTMENT TOPICAL at 19:24

## 2023-10-07 RX ADMIN — ATENOLOL 50 MG: 50 TABLET ORAL at 19:25

## 2023-10-07 RX ADMIN — ASPIRIN 81 MG: 81 TABLET, COATED ORAL at 08:26

## 2023-10-07 RX ADMIN — ACETAMINOPHEN 1000 MG: 500 TABLET ORAL at 17:35

## 2023-10-07 RX ADMIN — ACETAMINOPHEN 1000 MG: 500 TABLET ORAL at 09:35

## 2023-10-07 RX ADMIN — Medication 10 ML: at 08:28

## 2023-10-07 RX ADMIN — Medication 10 ML: at 19:25

## 2023-10-07 RX ADMIN — QUETIAPINE FUMARATE 200 MG: 100 TABLET ORAL at 19:25

## 2023-10-07 RX ADMIN — OXYCODONE HYDROCHLORIDE 5 MG: 5 TABLET ORAL at 03:48

## 2023-10-07 RX ADMIN — ATENOLOL 50 MG: 50 TABLET ORAL at 08:26

## 2023-10-07 RX ADMIN — BACITRACIN 0.9 G: 500 OINTMENT TOPICAL at 08:28

## 2023-10-07 RX ADMIN — Medication 10 ML: at 19:24
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
## 2023-10-07 NOTE — PLAN OF CARE
Goal Outcome Evaluation:  Plan of Care Reviewed With: patient        Progress: improving  Outcome Evaluation: Improving mobility. Able to walk a short distancee with assistance and a platform walker.

## 2023-10-07 NOTE — CASE MANAGEMENT/SOCIAL WORK
Case Management Discharge Note      Final Note: Per Ally with [facility], they will accept Mr. Grey to the CVA2 unit tomorrow.  I have discussed this bed offer today with Mr. Grye and he is agreeable to [facility] inpatient rehab as well as stretcher transportation to be arranged.  I have arranged [facility] transportation for in the morning, Ede 10/8.  They will arrive at the bedside at 11am.  Nurse to please adalberto report to [phone].  CM will fax the discharge summary when available to [phone].  Mr. Grey denies having any additional discharge needs at this time.         Selected Continued Care - Admitted Since 10/2/2023       Destination Coordination complete.      Service Provider Selected Services Address Phone Fax Patient Preferred    [facility] [address] [address] [phone] --              Durable Medical Equipment    No services have been selected for the patient.                Dialysis/Infusion    No services have been selected for the patient.                Home Medical Care    No services have been selected for the patient.                Therapy    No services have been selected for the patient.                Community Resources    No services have been selected for the patient.                Community & DME    No services have been selected for the patient.                    Transportation Services  Ambulance: Other ([facility] medical stretcher transportation)    Final Discharge Disposition Code: 62 - inpatient rehab facility

## 2023-10-07 NOTE — PLAN OF CARE
Elastic bandage to LUE/RLE CDI.  digits/toes wiggle with rapid cap refill. SBP remains elevated. SR on cardiac mx. Affected extremities elevated on pillows. Patient denies pain/discomfort at this time. Call light in reach.
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
## 2023-10-08 VITALS
HEIGHT: 66 IN | TEMPERATURE: 98.2 F | WEIGHT: 293.6 LBS | DIASTOLIC BLOOD PRESSURE: 75 MMHG | BODY MASS INDEX: 47.18 KG/M2 | RESPIRATION RATE: 18 BRPM | HEART RATE: 77 BPM | SYSTOLIC BLOOD PRESSURE: 150 MMHG | OXYGEN SATURATION: 98 %

## 2023-10-08 PROCEDURE — G0378 HOSPITAL OBSERVATION PER HR: HCPCS

## 2023-10-08 RX ORDER — DIAPER,BRIEF,INFANT-TODD,DISP
1 EACH MISCELLANEOUS EVERY 12 HOURS SCHEDULED
Start: 2023-10-08

## 2023-10-08 RX ORDER — ATENOLOL 25 MG/1
50 TABLET ORAL 2 TIMES DAILY
Start: 2023-10-08

## 2023-10-08 RX ORDER — POLYETHYLENE GLYCOL 3350 17 G/17G
17 POWDER, FOR SOLUTION ORAL AS NEEDED
Start: 2023-10-08 | End: 2023-10-11

## 2023-10-08 RX ORDER — AMOXICILLIN 250 MG
2 CAPSULE ORAL NIGHTLY PRN
Start: 2023-10-08 | End: 2023-10-11

## 2023-10-08 RX ORDER — DOCUSATE SODIUM 100 MG/1
100 CAPSULE, LIQUID FILLED ORAL 2 TIMES DAILY
Qty: 30 CAPSULE | Refills: 0 | Status: SHIPPED | OUTPATIENT
Start: 2023-10-08 | End: 2023-10-11

## 2023-10-08 RX ORDER — ASPIRIN 81 MG/1
81 TABLET ORAL 2 TIMES DAILY
Start: 2023-10-08 | End: 2023-11-07

## 2023-10-08 RX ORDER — CHOLECALCIFEROL (VITAMIN D3) 125 MCG
5 CAPSULE ORAL NIGHTLY PRN
Start: 2023-10-08 | End: 2023-10-11

## 2023-10-08 RX ORDER — OXYCODONE HYDROCHLORIDE 5 MG/1
5 TABLET ORAL EVERY 4 HOURS PRN
Start: 2023-10-08 | End: 2023-10-11

## 2023-10-08 RX ORDER — ONDANSETRON 4 MG/1
4 TABLET, FILM COATED ORAL EVERY 8 HOURS PRN
Qty: 15 TABLET | Refills: 0 | Status: SHIPPED | OUTPATIENT
Start: 2023-10-08

## 2023-10-08 RX ORDER — ACETAMINOPHEN 500 MG
1000 TABLET ORAL EVERY 8 HOURS
Start: 2023-10-08

## 2023-10-08 RX ORDER — ACETAMINOPHEN 500 MG
500 TABLET ORAL EVERY 6 HOURS PRN
Qty: 30 TABLET | Refills: 0 | Status: SHIPPED | OUTPATIENT
Start: 2023-10-08

## 2023-10-08 RX ADMIN — Medication 10 ML: at 08:47

## 2023-10-08 RX ADMIN — ASPIRIN 81 MG: 81 TABLET, COATED ORAL at 08:45

## 2023-10-08 RX ADMIN — ACETAMINOPHEN 1000 MG: 500 TABLET ORAL at 08:45

## 2023-10-08 RX ADMIN — BACITRACIN 0.9 G: 500 OINTMENT TOPICAL at 08:47

## 2023-10-08 RX ADMIN — ATENOLOL 50 MG: 50 TABLET ORAL at 08:45
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
## 2023-10-08 NOTE — PLAN OF CARE
VSS on RA. SR in 70s on cardiac mx. Patient c/o intermittent sharp pain to LUE. Adequately controlled with PO meds. Elastic bandage CDI. Toes/digits to the affected extremities wiggle with rapid cap refill. Call light in reach.
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
## 2023-10-11 ENCOUNTER — OFFICE VISIT (OUTPATIENT)
Dept: ORTHOPEDIC SURGERY | Facility: CLINIC | Age: 33
End: 2023-10-11
Payer: COMMERCIAL

## 2023-10-11 VITALS — TEMPERATURE: 97.5 F

## 2023-10-11 DIAGNOSIS — Z87.81 S/P ORIF (OPEN REDUCTION INTERNAL FIXATION) FRACTURE: Primary | ICD-10-CM

## 2023-10-11 DIAGNOSIS — Z98.890 S/P ORIF (OPEN REDUCTION INTERNAL FIXATION) FRACTURE: Primary | ICD-10-CM
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
## 2023-10-17 ENCOUNTER — NURSE TRIAGE (OUTPATIENT)
Dept: CALL CENTER | Facility: HOSPITAL | Age: 33
End: 2023-10-17
Payer: COMMERCIAL

## 2023-10-17 NOTE — TELEPHONE ENCOUNTER
"Reason for Disposition   Health Information question, no triage required and triager able to answer question    Additional Information   Negative: [1] Caller is not with the adult (patient) AND [2] reporting urgent symptoms   Negative: Lab result questions   Negative: Medication questions   Negative: Caller can't be reached by phone   Negative: Caller has already spoken to PCP or another triager   Negative: RN needs further essential information from caller in order to complete triage   Negative: Requesting regular office appointment   Negative: [1] Caller requesting NON-URGENT health information AND [2] PCP's office is the best resource    Answer Assessment - Initial Assessment Questions  1. REASON FOR CALL or QUESTION: \"What is your reason for calling today?\" or \"How can I best help you?\" or \"What question do you have that I can help answer?\"      Caller given phone number to Medical Records for record release.    Protocols used: Information Only Call - No Triage-ADULT-    "
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
## 2023-10-25 ENCOUNTER — TREATMENT (OUTPATIENT)
Age: 33
End: 2023-10-25
Payer: COMMERCIAL

## 2023-10-25 ENCOUNTER — OFFICE VISIT (OUTPATIENT)
Dept: ORTHOPEDIC SURGERY | Facility: CLINIC | Age: 33
End: 2023-10-25
Payer: COMMERCIAL

## 2023-10-25 VITALS — TEMPERATURE: 97.3 F

## 2023-10-25 DIAGNOSIS — Z09 SURGERY FOLLOW-UP: Primary | ICD-10-CM

## 2023-10-25 DIAGNOSIS — M25.532 LEFT WRIST PAIN: ICD-10-CM

## 2023-10-25 DIAGNOSIS — Z87.81 S/P ORIF (OPEN REDUCTION INTERNAL FIXATION) FRACTURE: Primary | ICD-10-CM

## 2023-10-25 DIAGNOSIS — S52.572A OTHER CLOSED INTRA-ARTICULAR FRACTURE OF DISTAL END OF LEFT RADIUS, INITIAL ENCOUNTER: Primary | ICD-10-CM

## 2023-10-25 DIAGNOSIS — Z47.89 ORTHOPEDIC AFTERCARE: ICD-10-CM

## 2023-10-25 DIAGNOSIS — Z98.890 S/P ORIF (OPEN REDUCTION INTERNAL FIXATION) FRACTURE: Primary | ICD-10-CM

## 2023-10-25 PROCEDURE — 99024 POSTOP FOLLOW-UP VISIT: CPT | Performed by: ORTHOPAEDIC SURGERY

## 2023-10-25 PROCEDURE — 99024 POSTOP FOLLOW-UP VISIT: CPT | Performed by: PLASTIC SURGERY

## 2023-10-25 RX ORDER — OXYCODONE HYDROCHLORIDE 5 MG/1
5 TABLET ORAL
COMMUNITY
Start: 2023-10-12
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
## 2023-10-25 NOTE — PROGRESS NOTES
[facility] Surgery Office Follow Up     Office Follow Up Visit     Date: 10/25/2023   Patient Name: Stef Grey  MRN: [id number]  YOB: 1990  Chief Complaint:   Chief Complaint   Patient presents with    Post-op     2.5 week S/P ANKLE OPEN REDUCTION INTERNAL FIXATION RIGHT (DOS 10/05/2023)     History of Present Illness:   Stef Grey is a 33 y.o. male who is here today for follow up for postop follow-up.  Has been nonweightbearing in splint since last seen in clinic 2 weeks ago.  Pain well controlled.  Did discontinue taking aspirin because he was having blood in his stool.  Denies calf pain.  No other complaints.    Subjective   I reviewed the patient's chief complaint, history of present illness, review of systems, past medical history, surgical history, family history, social history, medications and allergy list   Objective    Vital Signs:   Vitals:    10/25/23 1158   Temp: 97.3 °F (36.3 °C)     There is no height or weight on file to calculate BMI.    Ortho Exam:  right LE Foot and Ankle Exam:   Splint taken down for exam.  Leg compartments soft and compressible.  Incisions clean dry and intact laterally, sutures in place, no drainage or erythema.  Able to actively plantarflex and dorsiflex ankle and all toes.  Appropriate swelling for this stage of healing about the foot and ankle.  Toes warm and well-perfused.  Brisk cap refill in all toes.     Results Review:  XR Ankle 3+ View Right  Right Ankle X-Ray 10/25/23   Indication: Pain  Views: 3 simulated weight bearing , comparison to previous  Findings: xrays reviewed by me today in the office and show, hardware in   place with proper alignment, no signs of hardware failure, ankle mortise   well-maintained   XR Wrist 3+ View Left  Left Wrist X-Ray    Indication: s/p ORIF    Views:  AP, Lateral, and Oblique     Comparison: X-ray from 10/11/2023    Findings:  Patient is status post ORIF of the  distal radius.  Alignment remains stable.  The fracture appears to be healing appropriately.  New bone is visualized.    The hardware is intact.    Normal soft tissues.  Normal joint spaces    Impression:  X-ray of the left wrist personally reviewed by me.  Fracture   of the left distal radius s/p ORIF with stable alignment and intact   hardware.       Assessment / Plan    Assessment/Plan:   Diagnoses and all orders for this visit:    1. S/P ORIF (open reduction internal fixation) fracture (Primary)  -     XR Ankle 3+ View Right      Patient is now 3 weeks postop., doing well. Happy with result, no complaints. Placed in a nonweightbearing short leg fiberglass cast in clinic today. Sutures removed in clinic today.  Steri-Strips placed.  Continue to ice and elevate during recovery.  Patient is to be nonweightbearing with use of assistive devices.  Discussed with patient discontinuation of aspirin and counseled patient on signs and symptoms of DVT and to present for evaluation to clinic or hospital soon as possible if he develops any concerns.  Patient expressed understanding.  Plan to see patient back in clinic in 3 weeks for repeat x-rays and reevaluation.  Patient counseled to contact the clinic if anything should arise in the meantime.     Follow Up:   Return in about 3 weeks (around 11/15/2023) for F/U with Images, Recheck.      Venancio Blum MD  [facility] Orthopedic Surgeon

## 2023-10-25 NOTE — PROGRESS NOTES
[facility]     Follow-up Office Visit       Date: 10/25/2023   Patient Name: Stef Grey  MRN: [id number]  YOB: 1990    Chief Complaint:   Chief Complaint   Patient presents with    Post-op      2 Week Recheck-- 3 Week Status Post -  left open reduction internal fixation distal radius (DOS 10/5/23)       History of Present Illness:   Stef Grey is a 33 y.o. male presents for follow-up 3 weeks status post open reduction internal fixation of comminuted intra-articular left distal radius fracture.  Patient reports that he has been doing well since his last visit.  Patient reports that he has been wearing the brace as instructed.  He reports minimal left wrist pain.  He has no complaints at this time.      Subjective   Review of Systems:   Review of Systems   Constitutional: Negative.  Negative for chills, fatigue and fever.   HENT: Negative.  Negative for congestion and dental problem.    Eyes: Negative.  Negative for blurred vision.   Respiratory: Negative.  Negative for shortness of breath.    Cardiovascular: Negative.  Negative for leg swelling.   Gastrointestinal: Negative.  Negative for abdominal pain.   Endocrine: Negative.  Negative for polyuria.   Genitourinary: Negative.  Negative for difficulty urinating.   Musculoskeletal:  Positive for arthralgias.   Skin: Negative.    Allergic/Immunologic: Negative.    Neurological: Negative.    Hematological: Negative.  Negative for adenopathy.   Psychiatric/Behavioral: Negative.  Negative for behavioral problems.         Pertinent review of systems per HPI    I reviewed the patient's chief complaint, history of present illness, review of systems, past medical history, surgical history, family history, social history, medications and allergy list in the EMR on 10/25/2023 and agree with the findings above.    Objective    Vital Signs: There were no vitals  filed for this visit.  BMI: Class 3 Severe Obesity (BMI >=40). Obesity-related health conditions include the following: none. Obesity is unchanged. BMI is is above average; no BMI management plan is appropriate. We discussed portion control and increasing exercise.       General Appearance: No acute distress. Alert and oriented.     Chest:  Non-labored breathing on room air      Left upper Extremity:  Brace removed.  Incision clear dry intact and healing appropriately.  Fingers warm and well-perfused distally  Sensation intact to light touch in the median, radian and ulnar nerve distributions    Imaging/Studies:   Imaging Results (Last 24 Hours)       Procedure Component Value Units Date/Time    XR Wrist 3+ View Left [[id number]] Resulted: 10/25/23 1128     Updated: 10/25/23 1133    Narrative:      Left Wrist X-Ray    Indication: s/p ORIF    Views:  AP, Lateral, and Oblique     Comparison: X-ray from 10/11/2023    Findings:  Patient is status post ORIF of the distal radius.  Alignment remains stable.  The fracture appears to be healing appropriately.  New bone is visualized.    The hardware is intact.    Normal soft tissues.  Normal joint spaces      Impression:  X-ray of the left wrist personally reviewed by me.  Fracture   of the left distal radius s/p ORIF with stable alignment and intact   hardware.              Procedures:  Procedures    Quality Measures:   ACP:   ACP discussion was declined by the patient. Patient has an advance directive in EMR which is still valid.     Tobacco:   Stef Jimenezy Que  reports that he has never smoked. He has been exposed to tobacco smoke. He has never used smokeless tobacco..   Assessment / Plan    Assessment/Plan:      Diagnosis Plan   1. Surgery follow-up  XR Wrist 3+ View Left    Ambulatory Referral to Physical Therapy Evaluate and treat, Ortho          Patient is 3 weeks status post open reduction internal fixation of left distal radius fracture.  Given the severe  comminution and intra-articular nature of his fracture we will go slow with hand therapy but we will initiate gentle therapy at this time.  Recommend slow therapy program with gentle active range of motion out of the splint.  Continue nonweightbearing left distal radius fracture.  Patient must remain out of work of the fracture continues to heal.  Estimated return to work date 3 months after the fracture was fixed so approximately January 5, 2024.    Recommend follow-up in 3 weeks for repeat x-rays.  We will consider removing the K wire at that time.    Follow Up:   No follow-ups on file.        Kevin Medina MD  [facility] Upper Extremity Surgeon

## 2023-10-30 ENCOUNTER — TELEPHONE (OUTPATIENT)
Dept: ORTHOPEDIC SURGERY | Facility: CLINIC | Age: 33
End: 2023-10-30
Payer: COMMERCIAL

## 2023-10-30 NOTE — TELEPHONE ENCOUNTER
Patient called for update on [employer] [employer] [facility] paperwork that was received 10/26/23. Patient states his employer keeps sending him emails in regards to it. Advised it is in Dr. Flowers folder to be completed. Patient has deadline of 11/2/23.    Please advise

## 2023-11-02 ENCOUNTER — TELEPHONE (OUTPATIENT)
Dept: ORTHOPEDIC SURGERY | Facility: CLINIC | Age: 33
End: 2023-11-02
Payer: COMMERCIAL

## 2023-11-02 NOTE — TELEPHONE ENCOUNTER
Provider: DR. SZYMANSKI    Caller: KEEGAN SUÁREZ    Relationship to Patient: SELF    Phone Number: [phone]    Reason for Call: PATIENT CALLED WANTING TO SPEAK TO SOMEONE REGARDING MEDICAL DISABILITY. ANKLE OPEN REDUCTION INTERNAL FIXATION RIGHT  AND OPEN REDUCTION INTERNAL FIXATION DISTAL RADIUS - LEFT 10/05/23. STATES EMPLOYER IS REQUESTING ADDITIONAL PAPERWORK BEFORE THEY ALLOW HIM TO GET PAID FROM CURRENT MEDICAL HINDRANCE. PLEASE ADVISE.
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
## 2023-11-06 NOTE — TELEPHONE ENCOUNTER
Called and spoke to patient to let him know that he would have to go over to the radiology department to get a copy of his imaging as I cannot provide that for him. Informed him that the radiology department is at the [facility] hospital. Asked patient if he needed the office notes from Dr Medina and Dr Blmu as they both performed surgery on him faxed over to his employer since the [facility] papers were not suffice, patient stated he did not know a fax number and was waiting to hear back from them. Patient asked if he was able to print these from his [employer] if he needed them and reassured him that he could print them from there as well but If he had trouble accessing them, he could contact our office again with a fax number and we could fax those over for him.  Patient verbalized understanding and was appreciative.

## 2023-11-07 ENCOUNTER — TELEPHONE (OUTPATIENT)
Dept: ORTHOPEDIC SURGERY | Facility: CLINIC | Age: 33
End: 2023-11-07
Payer: COMMERCIAL

## 2023-11-07 ENCOUNTER — TELEPHONE (OUTPATIENT)
Dept: ORTHOPEDIC SURGERY | Facility: CLINIC | Age: 33
End: 2023-11-07
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
## 2023-11-07 NOTE — TELEPHONE ENCOUNTER
Caller: KEEGAN SUÁREZ     Relationship: SELF    Best call back number: [phone]    What is the best time to reach you: ANY    Who are you requesting to speak with (clinical staff, provider,  specific staff member): CLINICAL       What was the call regarding: ARM PAIN

## 2023-11-07 NOTE — TELEPHONE ENCOUNTER
Called and spoke to patient to answer his questions about the implants in his arm. At that point, patient stated that he has a dot on his arm on the thumb side of his wrist and when he tries to do his hand exercises he gets a knife stabbing pain and feels like wire is pulling. Pt denies any redness, fever, chills, or warm to the touch. Patient states he has been taking ibuprofen and tylenol to help with the pain. He does not feel he needs to come in for a sooner appointment just curious if the hardware that is in his arm will cause that pain to last forever. Informed patient that per  Dr Medina's OP note, he used a skeletal dynamics geminus 4 hole standard distal radius locking plate and non locking screws but he also used K Wires to hold it in place and that he plans to remove the K wire at his next post op visit on 11/15 if everything is healing appropriately and he feels they are ready to come out. Patient stated his foot is feeling fine at the moment that he is just worried about the hardware in his hand. Patient had some questions about if the plate and screws are a permanent thing or if they would be removed at some point in the future. Informed patient, that typically they only remove the plates and screws if he develops problems later in the future and it is absolutely necessary. Patient verbalized understanding and confirmed he would see us at his post op visit on 11/15.

## 2023-11-15 ENCOUNTER — ANESTHESIA EVENT (OUTPATIENT)
Dept: PERIOP | Facility: HOSPITAL | Age: 33
End: 2023-11-15
Payer: COMMERCIAL

## 2023-11-15 ENCOUNTER — ANESTHESIA (OUTPATIENT)
Dept: PERIOP | Facility: HOSPITAL | Age: 33
End: 2023-11-15
Payer: COMMERCIAL

## 2023-11-15 ENCOUNTER — APPOINTMENT (OUTPATIENT)
Dept: CT IMAGING | Facility: HOSPITAL | Age: 33
DRG: 345 | End: 2023-11-15
Payer: COMMERCIAL

## 2023-11-15 ENCOUNTER — HOSPITAL ENCOUNTER (OUTPATIENT)
Facility: HOSPITAL | Age: 33
Setting detail: SURGERY ADMIT
DRG: 345 | End: 2023-11-15
Attending: SURGERY | Admitting: SURGERY
Payer: COMMERCIAL

## 2023-11-15 ENCOUNTER — APPOINTMENT (OUTPATIENT)
Dept: GENERAL RADIOLOGY | Facility: HOSPITAL | Age: 33
DRG: 345 | End: 2023-11-15
Payer: COMMERCIAL

## 2023-11-15 ENCOUNTER — HOSPITAL ENCOUNTER (INPATIENT)
Facility: HOSPITAL | Age: 33
LOS: 3 days | Discharge: HOME | DRG: 345 | End: 2023-11-18
Attending: EMERGENCY MEDICINE | Admitting: INTERNAL MEDICINE
Payer: COMMERCIAL

## 2023-11-15 DIAGNOSIS — K56.609 SMALL BOWEL OBSTRUCTION: Primary | ICD-10-CM

## 2023-11-15 DIAGNOSIS — K80.20 GALLSTONES: ICD-10-CM

## 2023-11-15 DIAGNOSIS — G89.18 POST-OP PAIN: ICD-10-CM

## 2023-11-15 DIAGNOSIS — R10.9 ACUTE ABDOMINAL PAIN: ICD-10-CM

## 2023-11-15 PROBLEM — F20.9 SCHIZOPHRENIA: Status: ACTIVE | Noted: 2023-11-15

## 2023-11-15 PROBLEM — E66.01 OBESITY, MORBID, BMI 40.0-49.9: Status: ACTIVE | Noted: 2023-11-15

## 2023-11-15 PROBLEM — I10 HTN (HYPERTENSION): Status: ACTIVE | Noted: 2023-11-15

## 2023-11-15 LAB
ALBUMIN SERPL-MCNC: 4.3 G/DL (ref 3.5–5.2)
ALBUMIN/GLOB SERPL: 1.6 G/DL
ALP SERPL-CCNC: 70 U/L (ref 39–117)
ALT SERPL W P-5'-P-CCNC: 29 U/L (ref 1–41)
ANION GAP SERPL CALCULATED.3IONS-SCNC: 14 MMOL/L (ref 5–15)
AST SERPL-CCNC: 25 U/L (ref 1–40)
BASOPHILS # BLD MANUAL: 0 10*3/MM3 (ref 0–0.2)
BASOPHILS NFR BLD MANUAL: 0 % (ref 0–1.5)
BILIRUB SERPL-MCNC: 1.3 MG/DL (ref 0–1.2)
BUN SERPL-MCNC: 16 MG/DL (ref 6–20)
BUN/CREAT SERPL: 20.3 (ref 7–25)
CALCIUM SPEC-SCNC: 9.8 MG/DL (ref 8.6–10.5)
CHLORIDE SERPL-SCNC: 99 MMOL/L (ref 98–107)
CO2 SERPL-SCNC: 23 MMOL/L (ref 22–29)
CREAT SERPL-MCNC: 0.79 MG/DL (ref 0.76–1.27)
D-LACTATE SERPL-SCNC: 2 MMOL/L (ref 0.5–2)
D-LACTATE SERPL-SCNC: 2.3 MMOL/L (ref 0.5–2)
DEPRECATED RDW RBC AUTO: 43 FL (ref 37–54)
EGFRCR SERPLBLD CKD-EPI 2021: 121.8 ML/MIN/1.73
EOSINOPHIL # BLD MANUAL: 0.31 10*3/MM3 (ref 0–0.4)
EOSINOPHIL NFR BLD MANUAL: 3 % (ref 0.3–6.2)
ERYTHROCYTE [DISTWIDTH] IN BLOOD BY AUTOMATED COUNT: 14 % (ref 12.3–15.4)
GLOBULIN UR ELPH-MCNC: 2.7 GM/DL
GLUCOSE SERPL-MCNC: 143 MG/DL (ref 65–99)
HCT VFR BLD AUTO: 46.7 % (ref 37.5–51)
HGB BLD-MCNC: 15.1 G/DL (ref 13–17.7)
HOLD SPECIMEN: NORMAL
LIPASE SERPL-CCNC: 11 U/L (ref 13–60)
LYMPHOCYTES # BLD MANUAL: 1.73 10*3/MM3 (ref 0.7–3.1)
LYMPHOCYTES NFR BLD MANUAL: 6 % (ref 5–12)
MCH RBC QN AUTO: 27.5 PG (ref 26.6–33)
MCHC RBC AUTO-ENTMCNC: 32.3 G/DL (ref 31.5–35.7)
MCV RBC AUTO: 84.9 FL (ref 79–97)
METAMYELOCYTES NFR BLD MANUAL: 2 % (ref 0–0)
MONOCYTES # BLD: 0.61 10*3/MM3 (ref 0.1–0.9)
NEUTROPHILS # BLD AUTO: 7.32 10*3/MM3 (ref 1.7–7)
NEUTROPHILS NFR BLD MANUAL: 18 % (ref 42.7–76)
NEUTS BAND NFR BLD MANUAL: 54 % (ref 0–5)
NRBC SPEC MANUAL: 0 /100 WBC (ref 0–0.2)
PLAT MORPH BLD: NORMAL
PLATELET # BLD AUTO: 201 10*3/MM3 (ref 140–450)
PMV BLD AUTO: 12.3 FL (ref 6–12)
POTASSIUM SERPL-SCNC: 4 MMOL/L (ref 3.5–5.2)
PROCALCITONIN SERPL-MCNC: 0.58 NG/ML (ref 0–0.25)
PROT SERPL-MCNC: 7 G/DL (ref 6–8.5)
RBC # BLD AUTO: 5.5 10*6/MM3 (ref 4.14–5.8)
RBC MORPH BLD: NORMAL
SODIUM SERPL-SCNC: 136 MMOL/L (ref 136–145)
VARIANT LYMPHS NFR BLD MANUAL: 13 % (ref 0–5)
VARIANT LYMPHS NFR BLD MANUAL: 4 % (ref 19.6–45.3)
WBC MORPH BLD: NORMAL
WBC NRBC COR # BLD: 10.17 10*3/MM3 (ref 3.4–10.8)
WHOLE BLOOD HOLD COAG: NORMAL
WHOLE BLOOD HOLD SPECIMEN: NORMAL

## 2023-11-15 PROCEDURE — 99285 EMERGENCY DEPT VISIT HI MDM: CPT

## 2023-11-15 PROCEDURE — 83605 ASSAY OF LACTIC ACID: CPT | Performed by: NURSE PRACTITIONER

## 2023-11-15 PROCEDURE — 25010000002 HYDROMORPHONE PER 4 MG: Performed by: INTERNAL MEDICINE

## 2023-11-15 PROCEDURE — 25810000003 LACTATED RINGERS PER 1000 ML

## 2023-11-15 PROCEDURE — 25810000003 LACTATED RINGERS SOLUTION: Performed by: NURSE PRACTITIONER

## 2023-11-15 PROCEDURE — 84145 PROCALCITONIN (PCT): CPT | Performed by: EMERGENCY MEDICINE

## 2023-11-15 PROCEDURE — 25010000002 HYDROMORPHONE PER 4 MG: Performed by: EMERGENCY MEDICINE

## 2023-11-15 PROCEDURE — 25010000002 ONDANSETRON PER 1 MG: Performed by: NURSE PRACTITIONER

## 2023-11-15 PROCEDURE — 83690 ASSAY OF LIPASE: CPT | Performed by: EMERGENCY MEDICINE

## 2023-11-15 PROCEDURE — 25010000002 SUGAMMADEX 200 MG/2ML SOLUTION

## 2023-11-15 PROCEDURE — 74177 CT ABD & PELVIS W/CONTRAST: CPT

## 2023-11-15 PROCEDURE — 25010000002 DEXAMETHASONE PER 1 MG

## 2023-11-15 PROCEDURE — 80053 COMPREHEN METABOLIC PANEL: CPT | Performed by: EMERGENCY MEDICINE

## 2023-11-15 PROCEDURE — 25010000002 MIDAZOLAM PER 1 MG

## 2023-11-15 PROCEDURE — 99223 1ST HOSP IP/OBS HIGH 75: CPT | Performed by: INTERNAL MEDICINE

## 2023-11-15 PROCEDURE — 25010000002 CEFAZOLIN PER 500 MG: Performed by: SURGERY

## 2023-11-15 PROCEDURE — 0DJD4ZZ INSPECTION OF LOWER INTESTINAL TRACT, PERCUTANEOUS ENDOSCOPIC APPROACH: ICD-10-PCS | Performed by: SURGERY

## 2023-11-15 PROCEDURE — 25010000002 FENTANYL CITRATE (PF) 100 MCG/2ML SOLUTION

## 2023-11-15 PROCEDURE — 74018 RADEX ABDOMEN 1 VIEW: CPT

## 2023-11-15 PROCEDURE — 25010000002 PROPOFOL 10 MG/ML EMULSION

## 2023-11-15 PROCEDURE — 25010000002 ONDANSETRON PER 1 MG

## 2023-11-15 PROCEDURE — 25810000003 LACTATED RINGERS PER 1000 ML: Performed by: ANESTHESIOLOGY

## 2023-11-15 PROCEDURE — 25810000003 LACTATED RINGERS PER 1000 ML: Performed by: INTERNAL MEDICINE

## 2023-11-15 PROCEDURE — 85007 BL SMEAR W/DIFF WBC COUNT: CPT | Performed by: EMERGENCY MEDICINE

## 2023-11-15 PROCEDURE — 85025 COMPLETE CBC W/AUTO DIFF WBC: CPT | Performed by: EMERGENCY MEDICINE

## 2023-11-15 PROCEDURE — 25510000001 IOPAMIDOL 61 % SOLUTION: Performed by: EMERGENCY MEDICINE

## 2023-11-15 RX ORDER — HYDROMORPHONE HYDROCHLORIDE 1 MG/ML
0.5 INJECTION, SOLUTION INTRAMUSCULAR; INTRAVENOUS; SUBCUTANEOUS
Status: DISCONTINUED | OUTPATIENT
Start: 2023-11-15 | End: 2023-11-18 | Stop reason: HOSPADM

## 2023-11-15 RX ORDER — BISACODYL 10 MG
10 SUPPOSITORY, RECTAL RECTAL DAILY PRN
Status: DISCONTINUED | OUTPATIENT
Start: 2023-11-15 | End: 2023-11-17

## 2023-11-15 RX ORDER — ENOXAPARIN SODIUM 100 MG/ML
40 INJECTION SUBCUTANEOUS EVERY 12 HOURS SCHEDULED
Status: DISCONTINUED | OUTPATIENT
Start: 2023-11-15 | End: 2023-11-15

## 2023-11-15 RX ORDER — ONDANSETRON 4 MG/1
4 TABLET, ORALLY DISINTEGRATING ORAL EVERY 8 HOURS PRN
COMMUNITY

## 2023-11-15 RX ORDER — DEXAMETHASONE SODIUM PHOSPHATE 4 MG/ML
INJECTION, SOLUTION INTRA-ARTICULAR; INTRALESIONAL; INTRAMUSCULAR; INTRAVENOUS; SOFT TISSUE AS NEEDED
Status: DISCONTINUED | OUTPATIENT
Start: 2023-11-15 | End: 2023-11-15 | Stop reason: SURG

## 2023-11-15 RX ORDER — NALOXONE HCL 0.4 MG/ML
0.4 VIAL (ML) INJECTION
Status: DISCONTINUED | OUTPATIENT
Start: 2023-11-15 | End: 2023-11-18 | Stop reason: HOSPADM

## 2023-11-15 RX ORDER — SODIUM CHLORIDE 0.9 % (FLUSH) 0.9 %
10 SYRINGE (ML) INJECTION AS NEEDED
Status: DISCONTINUED | OUTPATIENT
Start: 2023-11-15 | End: 2023-11-18 | Stop reason: HOSPADM

## 2023-11-15 RX ORDER — ONDANSETRON 4 MG/1
4 TABLET, FILM COATED ORAL EVERY 6 HOURS PRN
Status: DISCONTINUED | OUTPATIENT
Start: 2023-11-15 | End: 2023-11-18 | Stop reason: HOSPADM

## 2023-11-15 RX ORDER — MEPERIDINE HYDROCHLORIDE 25 MG/ML
12.5 INJECTION INTRAMUSCULAR; INTRAVENOUS; SUBCUTANEOUS
Status: DISCONTINUED | OUTPATIENT
Start: 2023-11-15 | End: 2023-11-15 | Stop reason: HOSPADM

## 2023-11-15 RX ORDER — SODIUM CHLORIDE, SODIUM LACTATE, POTASSIUM CHLORIDE, CALCIUM CHLORIDE 600; 310; 30; 20 MG/100ML; MG/100ML; MG/100ML; MG/100ML
INJECTION, SOLUTION INTRAVENOUS CONTINUOUS PRN
Status: DISCONTINUED | OUTPATIENT
Start: 2023-11-15 | End: 2023-11-15 | Stop reason: SURG

## 2023-11-15 RX ORDER — HYDRALAZINE HYDROCHLORIDE 20 MG/ML
5 INJECTION INTRAMUSCULAR; INTRAVENOUS
Status: DISCONTINUED | OUTPATIENT
Start: 2023-11-15 | End: 2023-11-15 | Stop reason: HOSPADM

## 2023-11-15 RX ORDER — NALOXONE HCL 0.4 MG/ML
0.4 VIAL (ML) INJECTION AS NEEDED
Status: DISCONTINUED | OUTPATIENT
Start: 2023-11-15 | End: 2023-11-15 | Stop reason: HOSPADM

## 2023-11-15 RX ORDER — LIDOCAINE HYDROCHLORIDE 10 MG/ML
0.5 INJECTION, SOLUTION EPIDURAL; INFILTRATION; INTRACAUDAL; PERINEURAL ONCE AS NEEDED
Status: CANCELLED | OUTPATIENT
Start: 2023-11-15

## 2023-11-15 RX ORDER — QUETIAPINE FUMARATE 100 MG/1
200 TABLET, FILM COATED ORAL NIGHTLY
Status: DISCONTINUED | OUTPATIENT
Start: 2023-11-15 | End: 2023-11-18 | Stop reason: HOSPADM

## 2023-11-15 RX ORDER — NALOXONE HCL 0.4 MG/ML
0.4 VIAL (ML) INJECTION
Status: DISCONTINUED | OUTPATIENT
Start: 2023-11-15 | End: 2023-11-17

## 2023-11-15 RX ORDER — ONDANSETRON 2 MG/ML
4 INJECTION INTRAMUSCULAR; INTRAVENOUS EVERY 6 HOURS PRN
Status: DISCONTINUED | OUTPATIENT
Start: 2023-11-15 | End: 2023-11-15 | Stop reason: SDUPTHER

## 2023-11-15 RX ORDER — CLOBETASOL PROPIONATE 0.5 MG/G
1 OINTMENT TOPICAL 2 TIMES DAILY
COMMUNITY

## 2023-11-15 RX ORDER — ONDANSETRON 2 MG/ML
4 INJECTION INTRAMUSCULAR; INTRAVENOUS EVERY 6 HOURS PRN
Status: DISCONTINUED | OUTPATIENT
Start: 2023-11-15 | End: 2023-11-18 | Stop reason: HOSPADM

## 2023-11-15 RX ORDER — HYDROMORPHONE HYDROCHLORIDE 1 MG/ML
0.5 INJECTION, SOLUTION INTRAMUSCULAR; INTRAVENOUS; SUBCUTANEOUS ONCE
Qty: 0.5 ML | Refills: 0 | Status: COMPLETED | OUTPATIENT
Start: 2023-11-15 | End: 2023-11-15

## 2023-11-15 RX ORDER — SODIUM CHLORIDE 0.9 % (FLUSH) 0.9 %
3-10 SYRINGE (ML) INJECTION AS NEEDED
Status: DISCONTINUED | OUTPATIENT
Start: 2023-11-15 | End: 2023-11-15 | Stop reason: HOSPADM

## 2023-11-15 RX ORDER — ENOXAPARIN SODIUM 100 MG/ML
40 INJECTION SUBCUTANEOUS NIGHTLY
Status: DISCONTINUED | OUTPATIENT
Start: 2023-11-15 | End: 2023-11-15

## 2023-11-15 RX ORDER — HYDROCODONE BITARTRATE AND ACETAMINOPHEN 5; 325 MG/1; MG/1
1 TABLET ORAL ONCE AS NEEDED
Status: DISCONTINUED | OUTPATIENT
Start: 2023-11-15 | End: 2023-11-15 | Stop reason: HOSPADM

## 2023-11-15 RX ORDER — IPRATROPIUM BROMIDE AND ALBUTEROL SULFATE 2.5; .5 MG/3ML; MG/3ML
3 SOLUTION RESPIRATORY (INHALATION) ONCE AS NEEDED
Status: DISCONTINUED | OUTPATIENT
Start: 2023-11-15 | End: 2023-11-15 | Stop reason: HOSPADM

## 2023-11-15 RX ORDER — PROPOFOL 10 MG/ML
VIAL (ML) INTRAVENOUS AS NEEDED
Status: DISCONTINUED | OUTPATIENT
Start: 2023-11-15 | End: 2023-11-15 | Stop reason: SURG

## 2023-11-15 RX ORDER — DROPERIDOL 2.5 MG/ML
0.62 INJECTION, SOLUTION INTRAMUSCULAR; INTRAVENOUS ONCE AS NEEDED
Status: DISCONTINUED | OUTPATIENT
Start: 2023-11-15 | End: 2023-11-15 | Stop reason: HOSPADM

## 2023-11-15 RX ORDER — HYDROMORPHONE HYDROCHLORIDE 1 MG/ML
0.5 INJECTION, SOLUTION INTRAMUSCULAR; INTRAVENOUS; SUBCUTANEOUS
Status: DISCONTINUED | OUTPATIENT
Start: 2023-11-15 | End: 2023-11-15 | Stop reason: HOSPADM

## 2023-11-15 RX ORDER — OXYCODONE HYDROCHLORIDE AND ACETAMINOPHEN 5; 325 MG/1; MG/1
2 TABLET ORAL EVERY 4 HOURS PRN
Status: DISCONTINUED | OUTPATIENT
Start: 2023-11-15 | End: 2023-11-17

## 2023-11-15 RX ORDER — MIDAZOLAM HYDROCHLORIDE 1 MG/ML
INJECTION INTRAMUSCULAR; INTRAVENOUS AS NEEDED
Status: DISCONTINUED | OUTPATIENT
Start: 2023-11-15 | End: 2023-11-15 | Stop reason: SURG

## 2023-11-15 RX ORDER — BUPIVACAINE HYDROCHLORIDE AND EPINEPHRINE 2.5; 5 MG/ML; UG/ML
INJECTION, SOLUTION EPIDURAL; INFILTRATION; INTRACAUDAL; PERINEURAL AS NEEDED
Status: DISCONTINUED | OUTPATIENT
Start: 2023-11-15 | End: 2023-11-15 | Stop reason: HOSPADM

## 2023-11-15 RX ORDER — PANTOPRAZOLE SODIUM 40 MG/1
40 TABLET, DELAYED RELEASE ORAL
Status: DISCONTINUED | OUTPATIENT
Start: 2023-11-16 | End: 2023-11-18 | Stop reason: HOSPADM

## 2023-11-15 RX ORDER — HEPARIN SODIUM 5000 [USP'U]/ML
5000 INJECTION, SOLUTION INTRAVENOUS; SUBCUTANEOUS EVERY 8 HOURS SCHEDULED
Status: DISCONTINUED | OUTPATIENT
Start: 2023-11-16 | End: 2023-11-18 | Stop reason: HOSPADM

## 2023-11-15 RX ORDER — ONDANSETRON 4 MG/1
4 TABLET, FILM COATED ORAL EVERY 6 HOURS PRN
Status: DISCONTINUED | OUTPATIENT
Start: 2023-11-15 | End: 2023-11-15 | Stop reason: SDUPTHER

## 2023-11-15 RX ORDER — PROMETHAZINE HYDROCHLORIDE 25 MG/1
25 SUPPOSITORY RECTAL ONCE AS NEEDED
Status: DISCONTINUED | OUTPATIENT
Start: 2023-11-15 | End: 2023-11-15 | Stop reason: HOSPADM

## 2023-11-15 RX ORDER — SUCCINYLCHOLINE/SOD CL,ISO/PF 200MG/10ML
SYRINGE (ML) INTRAVENOUS AS NEEDED
Status: DISCONTINUED | OUTPATIENT
Start: 2023-11-15 | End: 2023-11-15 | Stop reason: SURG

## 2023-11-15 RX ORDER — LOSARTAN POTASSIUM 50 MG/1
50 TABLET ORAL
Status: DISCONTINUED | OUTPATIENT
Start: 2023-11-15 | End: 2023-11-18 | Stop reason: HOSPADM

## 2023-11-15 RX ORDER — FAMOTIDINE 20 MG/1
20 TABLET, FILM COATED ORAL ONCE
Status: CANCELLED | OUTPATIENT
Start: 2023-11-15 | End: 2023-11-15

## 2023-11-15 RX ORDER — ROCURONIUM BROMIDE 10 MG/ML
INJECTION, SOLUTION INTRAVENOUS AS NEEDED
Status: DISCONTINUED | OUTPATIENT
Start: 2023-11-15 | End: 2023-11-15 | Stop reason: SURG

## 2023-11-15 RX ORDER — DROPERIDOL 2.5 MG/ML
0.62 INJECTION, SOLUTION INTRAMUSCULAR; INTRAVENOUS
Status: DISCONTINUED | OUTPATIENT
Start: 2023-11-15 | End: 2023-11-15 | Stop reason: HOSPADM

## 2023-11-15 RX ORDER — SODIUM CHLORIDE 9 MG/ML
40 INJECTION, SOLUTION INTRAVENOUS AS NEEDED
Status: DISCONTINUED | OUTPATIENT
Start: 2023-11-15 | End: 2023-11-15 | Stop reason: HOSPADM

## 2023-11-15 RX ORDER — ONDANSETRON 2 MG/ML
4 INJECTION INTRAMUSCULAR; INTRAVENOUS ONCE AS NEEDED
Status: DISCONTINUED | OUTPATIENT
Start: 2023-11-15 | End: 2023-11-15 | Stop reason: HOSPADM

## 2023-11-15 RX ORDER — SODIUM CHLORIDE 9 MG/ML
10 INJECTION INTRAVENOUS AS NEEDED
Status: DISCONTINUED | OUTPATIENT
Start: 2023-11-15 | End: 2023-11-18 | Stop reason: HOSPADM

## 2023-11-15 RX ORDER — SODIUM CHLORIDE, SODIUM LACTATE, POTASSIUM CHLORIDE, CALCIUM CHLORIDE 600; 310; 30; 20 MG/100ML; MG/100ML; MG/100ML; MG/100ML
125 INJECTION, SOLUTION INTRAVENOUS CONTINUOUS
Status: DISCONTINUED | OUTPATIENT
Start: 2023-11-15 | End: 2023-11-17

## 2023-11-15 RX ORDER — ONDANSETRON 2 MG/ML
INJECTION INTRAMUSCULAR; INTRAVENOUS AS NEEDED
Status: DISCONTINUED | OUTPATIENT
Start: 2023-11-15 | End: 2023-11-15 | Stop reason: SURG

## 2023-11-15 RX ORDER — LABETALOL HYDROCHLORIDE 5 MG/ML
5 INJECTION, SOLUTION INTRAVENOUS
Status: DISCONTINUED | OUTPATIENT
Start: 2023-11-15 | End: 2023-11-15 | Stop reason: HOSPADM

## 2023-11-15 RX ORDER — SODIUM CHLORIDE 0.9 % (FLUSH) 0.9 %
10 SYRINGE (ML) INJECTION AS NEEDED
Status: CANCELLED | OUTPATIENT
Start: 2023-11-15

## 2023-11-15 RX ORDER — BISACODYL 5 MG/1
5 TABLET, DELAYED RELEASE ORAL DAILY PRN
Status: DISCONTINUED | OUTPATIENT
Start: 2023-11-15 | End: 2023-11-17

## 2023-11-15 RX ORDER — DEXMEDETOMIDINE HYDROCHLORIDE 100 UG/ML
INJECTION, SOLUTION INTRAVENOUS AS NEEDED
Status: DISCONTINUED | OUTPATIENT
Start: 2023-11-15 | End: 2023-11-15 | Stop reason: SURG

## 2023-11-15 RX ORDER — FENTANYL CITRATE 50 UG/ML
INJECTION, SOLUTION INTRAMUSCULAR; INTRAVENOUS AS NEEDED
Status: DISCONTINUED | OUTPATIENT
Start: 2023-11-15 | End: 2023-11-15 | Stop reason: SURG

## 2023-11-15 RX ORDER — SODIUM CHLORIDE, SODIUM LACTATE, POTASSIUM CHLORIDE, CALCIUM CHLORIDE 600; 310; 30; 20 MG/100ML; MG/100ML; MG/100ML; MG/100ML
100 INJECTION, SOLUTION INTRAVENOUS CONTINUOUS
Status: DISCONTINUED | OUTPATIENT
Start: 2023-11-15 | End: 2023-11-15

## 2023-11-15 RX ORDER — SODIUM CHLORIDE 0.9 % (FLUSH) 0.9 %
10 SYRINGE (ML) INJECTION EVERY 12 HOURS SCHEDULED
Status: DISCONTINUED | OUTPATIENT
Start: 2023-11-15 | End: 2023-11-18 | Stop reason: HOSPADM

## 2023-11-15 RX ORDER — FENTANYL CITRATE 50 UG/ML
50 INJECTION, SOLUTION INTRAMUSCULAR; INTRAVENOUS
Status: DISCONTINUED | OUTPATIENT
Start: 2023-11-15 | End: 2023-11-15 | Stop reason: HOSPADM

## 2023-11-15 RX ORDER — FAMOTIDINE 10 MG/ML
20 INJECTION, SOLUTION INTRAVENOUS 2 TIMES DAILY
Status: DISCONTINUED | OUTPATIENT
Start: 2023-11-15 | End: 2023-11-15 | Stop reason: HOSPADM

## 2023-11-15 RX ORDER — PROMETHAZINE HYDROCHLORIDE 25 MG/1
25 TABLET ORAL ONCE AS NEEDED
Status: DISCONTINUED | OUTPATIENT
Start: 2023-11-15 | End: 2023-11-15 | Stop reason: HOSPADM

## 2023-11-15 RX ORDER — SODIUM CHLORIDE 9 MG/ML
40 INJECTION, SOLUTION INTRAVENOUS AS NEEDED
Status: CANCELLED | OUTPATIENT
Start: 2023-11-15

## 2023-11-15 RX ORDER — POLYETHYLENE GLYCOL 3350 17 G/17G
17 POWDER, FOR SOLUTION ORAL DAILY PRN
Status: DISCONTINUED | OUTPATIENT
Start: 2023-11-15 | End: 2023-11-17

## 2023-11-15 RX ORDER — LIDOCAINE HYDROCHLORIDE 10 MG/ML
INJECTION, SOLUTION EPIDURAL; INFILTRATION; INTRACAUDAL; PERINEURAL AS NEEDED
Status: DISCONTINUED | OUTPATIENT
Start: 2023-11-15 | End: 2023-11-15 | Stop reason: SURG

## 2023-11-15 RX ORDER — AMOXICILLIN 250 MG
2 CAPSULE ORAL 2 TIMES DAILY
Status: DISCONTINUED | OUTPATIENT
Start: 2023-11-15 | End: 2023-11-17

## 2023-11-15 RX ORDER — OLMESARTAN MEDOXOMIL 20 MG/1
20 TABLET ORAL DAILY
COMMUNITY

## 2023-11-15 RX ORDER — SODIUM CHLORIDE 9 MG/ML
40 INJECTION, SOLUTION INTRAVENOUS AS NEEDED
Status: DISCONTINUED | OUTPATIENT
Start: 2023-11-15 | End: 2023-11-18 | Stop reason: HOSPADM

## 2023-11-15 RX ORDER — MIDAZOLAM HYDROCHLORIDE 1 MG/ML
1 INJECTION INTRAMUSCULAR; INTRAVENOUS
Status: DISCONTINUED | OUTPATIENT
Start: 2023-11-15 | End: 2023-11-15 | Stop reason: HOSPADM

## 2023-11-15 RX ORDER — FAMOTIDINE 10 MG/ML
20 INJECTION, SOLUTION INTRAVENOUS ONCE
Status: CANCELLED | OUTPATIENT
Start: 2023-11-15 | End: 2023-11-15

## 2023-11-15 RX ORDER — SODIUM CHLORIDE, SODIUM LACTATE, POTASSIUM CHLORIDE, CALCIUM CHLORIDE 600; 310; 30; 20 MG/100ML; MG/100ML; MG/100ML; MG/100ML
9 INJECTION, SOLUTION INTRAVENOUS CONTINUOUS
Status: DISCONTINUED | OUTPATIENT
Start: 2023-11-15 | End: 2023-11-16

## 2023-11-15 RX ORDER — SODIUM CHLORIDE 0.9 % (FLUSH) 0.9 %
10 SYRINGE (ML) INJECTION EVERY 12 HOURS SCHEDULED
Status: CANCELLED | OUTPATIENT
Start: 2023-11-15

## 2023-11-15 RX ORDER — MORPHINE SULFATE 2 MG/ML
2 INJECTION, SOLUTION INTRAMUSCULAR; INTRAVENOUS
Status: DISCONTINUED | OUTPATIENT
Start: 2023-11-15 | End: 2023-11-17

## 2023-11-15 RX ORDER — ONDANSETRON 2 MG/ML
4 INJECTION INTRAMUSCULAR; INTRAVENOUS ONCE
Qty: 2 ML | Refills: 0 | Status: COMPLETED | OUTPATIENT
Start: 2023-11-15 | End: 2023-11-15

## 2023-11-15 RX ORDER — CEFAZOLIN SODIUM IN 0.9 % NACL 3 G/100 ML
3000 INTRAVENOUS SOLUTION, PIGGYBACK (ML) INTRAVENOUS ONCE
Qty: 100 ML | Refills: 0 | Status: COMPLETED | OUTPATIENT
Start: 2023-11-15 | End: 2023-11-15

## 2023-11-15 RX ORDER — MAGNESIUM HYDROXIDE 1200 MG/15ML
LIQUID ORAL AS NEEDED
Status: DISCONTINUED | OUTPATIENT
Start: 2023-11-15 | End: 2023-11-15 | Stop reason: HOSPADM

## 2023-11-15 RX ORDER — SODIUM CHLORIDE 0.9 % (FLUSH) 0.9 %
3 SYRINGE (ML) INJECTION EVERY 12 HOURS SCHEDULED
Status: DISCONTINUED | OUTPATIENT
Start: 2023-11-15 | End: 2023-11-15 | Stop reason: HOSPADM

## 2023-11-15 RX ADMIN — ONDANSETRON 4 MG: 2 INJECTION INTRAMUSCULAR; INTRAVENOUS at 08:29

## 2023-11-15 RX ADMIN — FENTANYL CITRATE 100 MCG: 50 INJECTION, SOLUTION INTRAMUSCULAR; INTRAVENOUS at 13:49

## 2023-11-15 RX ADMIN — HYDROMORPHONE HYDROCHLORIDE 0.5 MG: 1 INJECTION, SOLUTION INTRAMUSCULAR; INTRAVENOUS; SUBCUTANEOUS at 22:52

## 2023-11-15 RX ADMIN — LIDOCAINE HYDROCHLORIDE 50 MG: 10 INJECTION, SOLUTION EPIDURAL; INFILTRATION; INTRACAUDAL; PERINEURAL at 13:49

## 2023-11-15 RX ADMIN — SODIUM CHLORIDE, POTASSIUM CHLORIDE, SODIUM LACTATE AND CALCIUM CHLORIDE 1000 ML: 600; 310; 30; 20 INJECTION, SOLUTION INTRAVENOUS at 08:28

## 2023-11-15 RX ADMIN — HYDROMORPHONE HYDROCHLORIDE 0.5 MG: 1 INJECTION, SOLUTION INTRAMUSCULAR; INTRAVENOUS; SUBCUTANEOUS at 08:32

## 2023-11-15 RX ADMIN — HYDROMORPHONE HYDROCHLORIDE 0.5 MG: 1 INJECTION, SOLUTION INTRAMUSCULAR; INTRAVENOUS; SUBCUTANEOUS at 10:36

## 2023-11-15 RX ADMIN — FAMOTIDINE 20 MG: 10 INJECTION INTRAVENOUS at 13:26

## 2023-11-15 RX ADMIN — ROCURONIUM BROMIDE 50 MG: 10 SOLUTION INTRAVENOUS at 13:56

## 2023-11-15 RX ADMIN — ROCURONIUM BROMIDE 20 MG: 10 SOLUTION INTRAVENOUS at 14:08

## 2023-11-15 RX ADMIN — SODIUM CHLORIDE, POTASSIUM CHLORIDE, SODIUM LACTATE AND CALCIUM CHLORIDE: 600; 310; 30; 20 INJECTION, SOLUTION INTRAVENOUS at 13:40

## 2023-11-15 RX ADMIN — HYDROMORPHONE HYDROCHLORIDE 0.5 MG: 1 INJECTION, SOLUTION INTRAMUSCULAR; INTRAVENOUS; SUBCUTANEOUS at 12:08

## 2023-11-15 RX ADMIN — SODIUM CHLORIDE, POTASSIUM CHLORIDE, SODIUM LACTATE AND CALCIUM CHLORIDE 100 ML/HR: 600; 310; 30; 20 INJECTION, SOLUTION INTRAVENOUS at 11:11

## 2023-11-15 RX ADMIN — DEXMEDETOMIDINE HYDROCHLORIDE 4 MCG: 100 INJECTION, SOLUTION INTRAVENOUS at 14:24

## 2023-11-15 RX ADMIN — Medication 10 ML: at 13:00

## 2023-11-15 RX ADMIN — HYDROMORPHONE HYDROCHLORIDE 0.5 MG: 1 INJECTION, SOLUTION INTRAMUSCULAR; INTRAVENOUS; SUBCUTANEOUS at 19:25

## 2023-11-15 RX ADMIN — DEXAMETHASONE SODIUM PHOSPHATE 4 MG: 4 INJECTION, SOLUTION INTRAMUSCULAR; INTRAVENOUS at 13:54

## 2023-11-15 RX ADMIN — IOPAMIDOL 100 ML: 612 INJECTION, SOLUTION INTRAVENOUS at 08:56

## 2023-11-15 RX ADMIN — MIDAZOLAM HYDROCHLORIDE 2 MG: 1 INJECTION, SOLUTION INTRAMUSCULAR; INTRAVENOUS at 13:44

## 2023-11-15 RX ADMIN — SODIUM CHLORIDE, POTASSIUM CHLORIDE, SODIUM LACTATE AND CALCIUM CHLORIDE: 600; 310; 30; 20 INJECTION, SOLUTION INTRAVENOUS at 14:21

## 2023-11-15 RX ADMIN — Medication 10 ML: at 22:53

## 2023-11-15 RX ADMIN — DEXMEDETOMIDINE HYDROCHLORIDE 4 MCG: 100 INJECTION, SOLUTION INTRAVENOUS at 14:10

## 2023-11-15 RX ADMIN — DEXMEDETOMIDINE HYDROCHLORIDE 4 MCG: 100 INJECTION, SOLUTION INTRAVENOUS at 14:17

## 2023-11-15 RX ADMIN — PROPOFOL 250 MG: 10 INJECTION, EMULSION INTRAVENOUS at 13:49

## 2023-11-15 RX ADMIN — SUGAMMADEX 300 MG: 100 INJECTION, SOLUTION INTRAVENOUS at 14:36

## 2023-11-15 RX ADMIN — FENTANYL CITRATE 50 MCG: 50 INJECTION, SOLUTION INTRAMUSCULAR; INTRAVENOUS at 14:40

## 2023-11-15 RX ADMIN — Medication 200 MG: at 13:49

## 2023-11-15 RX ADMIN — SODIUM CHLORIDE, POTASSIUM CHLORIDE, SODIUM LACTATE AND CALCIUM CHLORIDE 9 ML/HR: 600; 310; 30; 20 INJECTION, SOLUTION INTRAVENOUS at 13:00

## 2023-11-15 RX ADMIN — ONDANSETRON 4 MG: 2 INJECTION INTRAMUSCULAR; INTRAVENOUS at 13:43

## 2023-11-15 RX ADMIN — SODIUM CHLORIDE 3000 MG: 9 INJECTION, SOLUTION INTRAVENOUS at 13:50

## 2023-11-15 RX ADMIN — FENTANYL CITRATE 50 MCG: 50 INJECTION, SOLUTION INTRAMUSCULAR; INTRAVENOUS at 14:45
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
## 2023-11-15 NOTE — ANESTHESIA PROCEDURE NOTES
Airway  Urgency: elective    Date/Time: 11/15/2023 1:50 PM  Airway not difficult    General Information and Staff    Patient location during procedure: OR    Indications and Patient Condition  Indications for airway management: airway protection    Preoxygenated: yes  MILS not maintained throughout  Mask difficulty assessment: 0 - not attempted    Final Airway Details  Final airway type: endotracheal airway      Successful airway: ETT  Cuffed: yes   Successful intubation technique: video laryngoscopy  Facilitating devices/methods: cricoid pressure and intubating stylet  Endotracheal tube insertion site: oral  Blade: Anderson  Blade size: 3  ETT size (mm): 7.5  Cormack-Lehane Classification: grade I - full view of glottis  Placement verified by: chest auscultation and capnometry   Cuff volume (mL): 5  Measured from: lips  ETT/EBT  to lips (cm): 22  Number of attempts at approach: 1  Assessment: lips, teeth, and gum same as pre-op and atraumatic intubation    Additional Comments  Negative epigastric sounds, Breath sound equal bilaterally with symmetric chest rise and fall
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
## 2023-11-15 NOTE — OP NOTE
Operative Report    Patient Name:  Stef Grey  YOB: 1990  [id number]    11/15/2023      PREOPERATIVE DIAGNOSIS: Concern for High-grade small bowel obstruction      POSTOPERATIVE DIAGNOSIS: Enteritis       PROCEDURE PERFORMED: Diagnostic Laparoscopy       SURGEON: Will Daley MD      ASSISTANT: Assistant: Steven Donis PA-C      Assistant responsibilities: Steven assisted with dissection, retraction, holding and positioning of the camera, exchange of instruments, and closure.  His assistance was necessary and required process of completion of the case     SPECIMENS: None      ESTIMATED BLOOD LOSS: Less than 10 mL       ANESTHESIA: General.        FINDINGS:  1.  There was significant distention of the proximal small bowel with gradual tapering to decompressed distal small bowel.  There was no evidence of any focal site of obstruction and the entire small bowel was evaluated from the ligament of Treitz to the ileocecal valve.  There was a small amount of serous ascites fluid       INDICATIONS:      This patient is a 33 y.o. male with a history of abdominal pain who presented with refractory nausea and vomiting and a CT scan that was concerning for a small bowel obstruction.  He was recommended to proceed to the operating room for diagnostic laparoscopy due to concern for high-grade small bowel obstruction.. Preoperative imaging including CT scan confirmed the diagnosis. The risks, benefits, and alternatives of the procedure were discussed with the patient and their family preoperatively and they agreed to proceed.          DESCRIPTION OF PROCEDURE:      After obtaining informed consent, the patient was taken to the operating room and placed in the supine position on the operating room table. General anesthesia was initiated. A Gordon catheter was placed. The abdomen was prepped and draped in the usual sterile fashion. A time-out was properly performed. Antibiotics were given  preoperatively. SCDs were properly placed on the patient and turned on.     Local anesthetic was injected prior to all skin incisions. A periumbilical skin incision was then created inferior to the umbilicus utilizing an 11 blade scalpel. Blunt dissection was carried down to the level of the anterior abdominal wall fascia and the base of the umbilicus. The base of the umbilicus was then grasped and elevated with a Kocher clamp. A vertical incision was then made in the anterior abdominal wall fascia under direct visualization using cautery. Following this, the peritoneal cavity was then entered under direct visualization. Stay sutures of 0 Vicryl were placed around the defect, and a 12 mm Sara trocar was then advanced without difficulty into the abdominal cavity. Pneumoperitoneum was established at 15 mmHg. The abdomen was then surveyed with a 10mm 30 degree laparoscope, with special attention to the viscera underlying the insertion site which was found to be free of injury.  Next, two additional 5 mm trocars were placed on the right side of the abdomen. The patient was then positioned in the head-down table tilt left position.      Immediate survey of the abdominal cavity demonstrated that there is some distended proximal small bowel and distal decompressed small bowel.  The distal decompressed small bowel was identified, and then using bowel graspers was run proximally all the way to the level of the ligament of Treitz.  The proximal small bowel was quite distended, but appeared completely viable.  The transverse colon was elevated and the ligament of Treitz was definitely identified.  We then carefully proceeded to run the small bowel distally from the site.  The proximal small bowel was quite distended, and great care was taken to ensure that the bowel was not injured with manipulation.  As we ran the small bowel distally we identified that there was a gradual transition to decompressed distal small bowel.   There was no well-defined transition point, no definite palpable mass, no definite adhesion or site of obstruction.  The distal small bowel for at least 100 cm was quite decompressed, and we continued to run this all the way to the level of the ileocecal valve.  The cecum was moderately distended with air.  The appendix was visualized and did not appear inflamed.  The sigmoid colon was somewhat distended but appeared healthy.  At this point the entire small bowel had been completely evaluated, with no evidence of a focal site of obstruction.  The remainder of the abdomen was briefly surveyed, the stomach appeared decompressed and healthy, and the colon did not appear to have any obvious pathology.  There was a small amount of clear ascites fluid in the patient's lower abdomen.     At this point, with the entire abdomen surveyed and the entire small bowel evaluated from the ligament of Treitz to the ileocecal valve I felt that there was no further need for any operative investigation and was satisfied that there was no evidence of small bowel obstruction.  Hemostasis was confirmed throughout the abdomen.  The 5 mm trocars were removed under direct laparoscopic visualization. The 12 mm trocar was then withdrawn and pneumoperitoneum was released. The fascia of the periumbilical trocar site was then closed in a figure-of-eight fashion with an 0 Vicryl suture. All wound sites were irrigated and hemostasis confirmed. The skin incisions were then closed using a 4-0 Monocryl suture in the subcuticular layer. Mastisol and Steri-Strips were then applied to each wound site with a clean and dry dressing overlying this.      After the procedure, the patient was awakened, extubated, and taken to the postoperative anesthesia care unit for recovery. All needle, instrument, and lap counts were correct. I was personally present and performed all portions of the procedure. There were no immediate complications         Will ANGELES  MD Edi  11/15/2023  14:31 EST

## 2023-11-15 NOTE — ANESTHESIA PREPROCEDURE EVALUATION
Anesthesia Evaluation     Patient summary reviewed and Nursing notes reviewed   NPO Solid Status: Waived due to emergency  NPO Liquid Status: Waived due to emergency           Airway   Mallampati: III  TM distance: >3 FB  Possible difficult intubation  Dental    (+) poor dentition    Pulmonary    (+) ,decreased breath sounds  Cardiovascular   Exercise tolerance: poor (<4 METS)    Rhythm: regular  Rate: normal        Neuro/Psych  GI/Hepatic/Renal/Endo      Musculoskeletal     Abdominal    Substance History      OB/GYN          Other                    Anesthesia Plan    ASA 3     general     (Rapid sequence induction/cricoid pressure  TAP blocks for post op pain control)    Anesthetic plan, risks, benefits, and alternatives have been provided, discussed and informed consent has been obtained with: patient.    CODE STATUS:    Level Of Support Discussed With: Patient  Code Status (Patient has no pulse and is not breathing): CPR (Attempt to Resuscitate)  Medical Interventions (Patient has pulse or is breathing): Full Support  Comments: mother as surrogate decision maker

## 2023-11-15 NOTE — PLAN OF CARE
Problem: Adult Inpatient Plan of Care  Goal: Plan of Care Review  Outcome: Ongoing, Not Progressing  Flowsheets (Taken 11/15/2023 3398)  Plan of Care Reviewed With:   patient   parent  Goal: Patient-Specific Goal (Individualized)  Outcome: Ongoing, Not Progressing  Goal: Absence of Hospital-Acquired Illness or Injury  Outcome: Ongoing, Not Progressing  Goal: Optimal Comfort and Wellbeing  Outcome: Ongoing, Not Progressing  Goal: Readiness for Transition of Care  Outcome: Ongoing, Not Progressing     Problem: Bleeding (Cholecystectomy)  Goal: Absence of Bleeding  Outcome: Ongoing, Not Progressing     Problem: Bowel Motility Impaired (Cholecystectomy)  Goal: Effective Bowel Elimination  Outcome: Ongoing, Not Progressing     Problem: Fluid and Electrolyte Imbalance (Cholecystectomy)  Goal: Fluid and Electrolyte Balance  Outcome: Ongoing, Not Progressing   Goal Outcome Evaluation:  Plan of Care Reviewed With: patient, parent

## 2023-11-15 NOTE — ANESTHESIA POSTPROCEDURE EVALUATION
Patient: Stef Grey    Procedure Summary       Date: 11/15/23 Room / Location:  [facility]    Anesthesia Start: 1340 Anesthesia Stop: 1505    Procedure: DIAGNOSTIC LAPAROSCOPY (Abdomen) Diagnosis:     Surgeons: Will Daley MD Provider: Nasim Oreilly MD    Anesthesia Type: general ASA Status: 3            Anesthesia Type: general    Vitals  Vitals Value Taken Time   /81 11/15/23 1505   Temp 98.3 °F (36.8 °C) 11/15/23 1505   Pulse 101 11/15/23 1505   Resp 16 11/15/23 1505   SpO2 93 % 11/15/23 1505           Post Anesthesia Care and Evaluation    Patient location during evaluation: PACU  Patient participation: complete - patient participated  Level of consciousness: sleepy but conscious  Pain management: adequate    Airway patency: patent  Anesthetic complications: No anesthetic complications  PONV Status: none  Cardiovascular status: hemodynamically stable and acceptable  Respiratory status: nonlabored ventilation, acceptable and nasal cannula  Hydration status: acceptable
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
## 2023-11-15 NOTE — PROGRESS NOTES
"Patient Name:  Stef Grey  YOB: 1990  [id number]    Surgery Post - Operative Note    Date of visit: 11/15/2023      Subjective: Resting in bed.  Pain controlled.        Objective:    /88 (BP Location: Left arm, Patient Position: Sitting)   Pulse 106   Temp 98 °F (36.7 °C) (Temporal)   Resp 16   Ht 167.6 cm (66\")   Wt 135 kg (298 lb)   SpO2 95%   BMI 48.10 kg/m²     CV:  Regular rate and rhythm   L:  Clear to auscultation bilaterally. Not labored on O2 by NC   ABD:  Soft, appropriately tender. Dressings clean, dry and intact   EXT:  No cyanosis, clubbing or edema        Assessment/ Plan:     Recovering well after diagnostic laparoscopy. Continue Pulmonary toilet        Will Daley MD  11/15/2023  16:54 EST    "

## 2023-11-16 LAB
ALBUMIN SERPL-MCNC: 4.2 G/DL (ref 3.5–5.2)
ALBUMIN/GLOB SERPL: 1.6 G/DL
ALP SERPL-CCNC: 70 U/L (ref 39–117)
ALT SERPL W P-5'-P-CCNC: 23 U/L (ref 1–41)
ANION GAP SERPL CALCULATED.3IONS-SCNC: 13 MMOL/L (ref 5–15)
AST SERPL-CCNC: 19 U/L (ref 1–40)
BASOPHILS # BLD AUTO: 0.1 10*3/MM3 (ref 0–0.2)
BASOPHILS NFR BLD AUTO: 0.8 % (ref 0–1.5)
BILIRUB SERPL-MCNC: 0.7 MG/DL (ref 0–1.2)
BUN SERPL-MCNC: 18 MG/DL (ref 6–20)
BUN/CREAT SERPL: 24 (ref 7–25)
CALCIUM SPEC-SCNC: 9.7 MG/DL (ref 8.6–10.5)
CHLORIDE SERPL-SCNC: 102 MMOL/L (ref 98–107)
CO2 SERPL-SCNC: 24 MMOL/L (ref 22–29)
CREAT SERPL-MCNC: 0.75 MG/DL (ref 0.76–1.27)
DEPRECATED RDW RBC AUTO: 44.7 FL (ref 37–54)
EGFRCR SERPLBLD CKD-EPI 2021: 123.7 ML/MIN/1.73
EOSINOPHIL # BLD AUTO: 0.09 10*3/MM3 (ref 0–0.4)
EOSINOPHIL NFR BLD AUTO: 0.7 % (ref 0.3–6.2)
ERYTHROCYTE [DISTWIDTH] IN BLOOD BY AUTOMATED COUNT: 14.3 % (ref 12.3–15.4)
GLOBULIN UR ELPH-MCNC: 2.7 GM/DL
GLUCOSE SERPL-MCNC: 105 MG/DL (ref 65–99)
HCT VFR BLD AUTO: 46.5 % (ref 37.5–51)
HGB BLD-MCNC: 14.7 G/DL (ref 13–17.7)
IMM GRANULOCYTES # BLD AUTO: 0.29 10*3/MM3 (ref 0–0.05)
IMM GRANULOCYTES NFR BLD AUTO: 2.3 % (ref 0–0.5)
LYMPHOCYTES # BLD AUTO: 1.45 10*3/MM3 (ref 0.7–3.1)
LYMPHOCYTES NFR BLD AUTO: 11.6 % (ref 19.6–45.3)
MCH RBC QN AUTO: 27.4 PG (ref 26.6–33)
MCHC RBC AUTO-ENTMCNC: 31.6 G/DL (ref 31.5–35.7)
MCV RBC AUTO: 86.8 FL (ref 79–97)
MONOCYTES # BLD AUTO: 1.67 10*3/MM3 (ref 0.1–0.9)
MONOCYTES NFR BLD AUTO: 13.4 % (ref 5–12)
NEUTROPHILS NFR BLD AUTO: 71.2 % (ref 42.7–76)
NEUTROPHILS NFR BLD AUTO: 8.85 10*3/MM3 (ref 1.7–7)
NRBC BLD AUTO-RTO: 0 /100 WBC (ref 0–0.2)
PLATELET # BLD AUTO: 218 10*3/MM3 (ref 140–450)
PMV BLD AUTO: 12.3 FL (ref 6–12)
POTASSIUM SERPL-SCNC: 4.1 MMOL/L (ref 3.5–5.2)
PROT SERPL-MCNC: 6.9 G/DL (ref 6–8.5)
RBC # BLD AUTO: 5.36 10*6/MM3 (ref 4.14–5.8)
SODIUM SERPL-SCNC: 139 MMOL/L (ref 136–145)
WBC NRBC COR # BLD: 12.45 10*3/MM3 (ref 3.4–10.8)

## 2023-11-16 PROCEDURE — 85025 COMPLETE CBC W/AUTO DIFF WBC: CPT | Performed by: SURGERY

## 2023-11-16 PROCEDURE — 25810000003 LACTATED RINGERS PER 1000 ML: Performed by: SURGERY

## 2023-11-16 PROCEDURE — 97535 SELF CARE MNGMENT TRAINING: CPT

## 2023-11-16 PROCEDURE — 94761 N-INVAS EAR/PLS OXIMETRY MLT: CPT

## 2023-11-16 PROCEDURE — 99232 SBSQ HOSP IP/OBS MODERATE 35: CPT | Performed by: INTERNAL MEDICINE

## 2023-11-16 PROCEDURE — 97166 OT EVAL MOD COMPLEX 45 MIN: CPT

## 2023-11-16 PROCEDURE — 97530 THERAPEUTIC ACTIVITIES: CPT

## 2023-11-16 PROCEDURE — 97162 PT EVAL MOD COMPLEX 30 MIN: CPT

## 2023-11-16 PROCEDURE — 80053 COMPREHEN METABOLIC PANEL: CPT | Performed by: INTERNAL MEDICINE

## 2023-11-16 PROCEDURE — 25010000002 HYDROMORPHONE PER 4 MG: Performed by: INTERNAL MEDICINE

## 2023-11-16 RX ORDER — ATENOLOL 25 MG/1
25 TABLET ORAL EVERY 12 HOURS SCHEDULED
Status: DISCONTINUED | OUTPATIENT
Start: 2023-11-16 | End: 2023-11-17

## 2023-11-16 RX ADMIN — SODIUM CHLORIDE, POTASSIUM CHLORIDE, SODIUM LACTATE AND CALCIUM CHLORIDE 125 ML/HR: 600; 310; 30; 20 INJECTION, SOLUTION INTRAVENOUS at 00:58

## 2023-11-16 RX ADMIN — SENNOSIDES AND DOCUSATE SODIUM 2 TABLET: 8.6; 5 TABLET ORAL at 20:04

## 2023-11-16 RX ADMIN — Medication 10 ML: at 09:21

## 2023-11-16 RX ADMIN — SODIUM CHLORIDE, POTASSIUM CHLORIDE, SODIUM LACTATE AND CALCIUM CHLORIDE 125 ML/HR: 600; 310; 30; 20 INJECTION, SOLUTION INTRAVENOUS at 09:24

## 2023-11-16 RX ADMIN — HYDROMORPHONE HYDROCHLORIDE 0.5 MG: 1 INJECTION, SOLUTION INTRAMUSCULAR; INTRAVENOUS; SUBCUTANEOUS at 16:39

## 2023-11-16 RX ADMIN — SODIUM CHLORIDE, POTASSIUM CHLORIDE, SODIUM LACTATE AND CALCIUM CHLORIDE 125 ML/HR: 600; 310; 30; 20 INJECTION, SOLUTION INTRAVENOUS at 18:14

## 2023-11-16 RX ADMIN — HYDROMORPHONE HYDROCHLORIDE 0.5 MG: 1 INJECTION, SOLUTION INTRAMUSCULAR; INTRAVENOUS; SUBCUTANEOUS at 06:49

## 2023-11-16 RX ADMIN — QUETIAPINE FUMARATE 200 MG: 100 TABLET ORAL at 20:04

## 2023-11-16 RX ADMIN — HYDROMORPHONE HYDROCHLORIDE 0.5 MG: 1 INJECTION, SOLUTION INTRAMUSCULAR; INTRAVENOUS; SUBCUTANEOUS at 09:33

## 2023-11-16 RX ADMIN — HYDROMORPHONE HYDROCHLORIDE 0.5 MG: 1 INJECTION, SOLUTION INTRAMUSCULAR; INTRAVENOUS; SUBCUTANEOUS at 14:02

## 2023-11-16 RX ADMIN — HYDROMORPHONE HYDROCHLORIDE 0.5 MG: 1 INJECTION, SOLUTION INTRAMUSCULAR; INTRAVENOUS; SUBCUTANEOUS at 04:13

## 2023-11-16 RX ADMIN — HYDROMORPHONE HYDROCHLORIDE 0.5 MG: 1 INJECTION, SOLUTION INTRAMUSCULAR; INTRAVENOUS; SUBCUTANEOUS at 00:57

## 2023-11-16 RX ADMIN — SENNOSIDES AND DOCUSATE SODIUM 2 TABLET: 8.6; 5 TABLET ORAL at 09:20

## 2023-11-16 RX ADMIN — HYDROMORPHONE HYDROCHLORIDE 0.5 MG: 1 INJECTION, SOLUTION INTRAMUSCULAR; INTRAVENOUS; SUBCUTANEOUS at 20:04

## 2023-11-16 NOTE — PROGRESS NOTES
"Patient Name:  Stef Grey  YOB: 1990  [id number]    Surgery Progress Note    Date of visit: 11/16/2023      Subjective: No acute events overnight.  Tells me that he feels quite a bit better today.  He reports the pain in his abdomen is gone.  No nausea or vomiting.  Has passed flatus.  No bowel movement.          Objective:     /70 (BP Location: Left arm, Patient Position: Lying)   Pulse 107   Temp 98.5 °F (36.9 °C) (Oral)   Resp 17   Ht 167.6 cm (66\")   Wt 135 kg (298 lb)   SpO2 94%   BMI 48.10 kg/m²     Intake/Output Summary (Last 24 hours) at 11/16/2023 0932  Last data filed at 11/16/2023 0532  Gross per 24 hour   Intake 2450 ml   Output 2750 ml   Net -300 ml       GEN:   Awake, alert, in no acute distress, resting comfortably in bed   CV:   Regular rate and rhythm  L:  Symmetric expansion, not labored on oxygen by nasal cannula  Abd:  Soft, morbidly obese, only very mild tenderness to deep palpation throughout the abdomen and around the incision sites, incisions are clean dry intact  Ext:  No cyanosis, clubbing, or edema    Recent labs that are back at this time have been reviewed.           Assessment/ Plan:    Mr. Grey is a 33-year-old gentleman with history of hypertension, schizophrenia, ADHD, and morbid obesity who presents with concern for small bowel obstruction     #Enteritis, concern for small bowel obstruction  -Postop day 1 following diagnostic laparoscopy.  No evidence of bowel pathology on diagnostic laparoscopy  -Vital signs are stable  -Labs are without significant change.  White blood cell count is 12.  Creatinine 0.7  -I think his presentation was more consistent with an enteritis given the fact that he was having diarrhea and there were no findings on laparoscopy yesterday.  Okay to discontinue NG tube today and advance diet as tolerated  -Supportive management of enteritis per primary team      Will Daley MD  11/16/2023  09:32 EST      "
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
## 2023-11-16 NOTE — PLAN OF CARE
Goal Outcome Evaluation:  Plan of Care Reviewed With: patient           Outcome Evaluation: Pt NWB LUE/ RLE d/t  recent Oct surgeries. Pt presents below baseline with self care/mobility d/t NWB status, abdominal pain, decr balance and activity tolerance. Pt mod A UBD, LBD -  uses AE for LBD since surgery,  min a x 2  transfers with difficulty maintaining NWB LUE. Recommend home with assist and HHOT.      Anticipated Discharge Disposition (OT): home with home health, home with assist

## 2023-11-16 NOTE — PROGRESS NOTES
[facility] Medicine Services  PROGRESS NOTE    Patient Name: Stef Grey  : 1990  MRN: [id number]    Date of Admission: 11/15/2023  Primary Care Physician: Sam Shepard MD    Subjective   Subjective     CC:  N/v/abdominal pain    HPI:  Patient reports feeling better - thinks a popsicle does sound good today  Working on a bowel movement  Hopeful family will bring in home wheelchair      Objective   Objective     Vital Signs:   Temp:  [98.1 °F (36.7 °C)-98.9 °F (37.2 °C)] 98.1 °F (36.7 °C)  Heart Rate:  [] 93  Resp:  [16-18] 16  BP: (131-140)/(70-88) 135/74  Flow (L/min):  [1-3] 1     Physical Exam:  Constitutional: No acute distress, awake, alert male lying in bed  HENT: NCAT, mucous membranes moist  Respiratory: Clear to auscultation bilaterally, respiratory effort normal   Cardiovascular: RRR, no murmurs, rubs, or gallops  Gastrointestinal: Soft, mild tenderness as expected around stitches, mild distension  Musculoskeletal: Hard cast RLE and LUE. Muscle tone within normal limits, no joint effusions appreciated  Psychiatric: Appropriate affect, cooperative  Neurologic: Alert and oriented, facial movements symmetric and spontaneous movement of all 4 extremities grossly equal bilaterally, speech clear  Skin: No rashes    Results Reviewed:  LAB RESULTS:      Lab 23  0404 11/15/23  1111 11/15/23  0754   WBC 12.45*  --  10.17   HEMOGLOBIN 14.7  --  15.1   HEMATOCRIT 46.5  --  46.7   PLATELETS 218  --  201   NEUTROS ABS 8.85*  --  7.32*   IMMATURE GRANS (ABS) 0.29*  --   --    LYMPHS ABS 1.45  --   --    MONOS ABS 1.67*  --   --    EOS ABS 0.09  --  0.31   MCV 86.8  --  84.9   PROCALCITONIN  --   --  0.58*   LACTATE  --  2.0 2.3*         Lab 23  0404 11/15/23  0754   SODIUM 139 136   POTASSIUM 4.1 4.0   CHLORIDE 102 99   CO2 24.0 23.0   ANION GAP 13.0 14.0   BUN 18 16   CREATININE 0.75* 0.79   EGFR 123.7 121.8   GLUCOSE 105* 143*   CALCIUM 9.7 9.8          Lab 11/16/23  0404 11/15/23  0754 11/13/23  1514   TOTAL PROTEIN 6.9 7.0  --    ALBUMIN 4.2 4.3  --    GLOBULIN 2.7 2.7  --    ALT (SGPT) 23 29  --    AST (SGOT) 19 25  --    BILIRUBIN 0.7 1.3*  --    ALK PHOS 70 70  --    LIPASE  --  11* 22                     Brief Urine Lab Results       None            Microbiology Results Abnormal       None            XR Abdomen KUB    Result Date: 11/15/2023  XR ABDOMEN KUB Date of Exam: 11/15/2023 11:13 AM EST Indication: s/p NGT placement Comparison: None available. Findings: A nasogastric tube is present with its tip at least within the stomach. There are some distended small bowel loops in what is visualized of the abdomen which could relate to small bowel obstruction.     Impression: Impression: 1.Abnormal dilated small bowel loops which could relate to small bowel obstruction. Electronically Signed: Casa Paz MD  11/15/2023 11:36 AM EST  Workstation ID: [id number]    CT Abdomen Pelvis With Contrast    Result Date: 11/15/2023  CT ABDOMEN PELVIS W CONTRAST Date of Exam: 11/15/2023 8:56 AM EST Indication: Left-sided abdominal tenderness. Comparison: None available. Technique: Axial CT images were obtained of the abdomen and pelvis following the uneventful intravenous administration of 94 mL Isovue-300. Reconstructed coronal and sagittal images were also obtained. Automated exposure control and iterative construction methods were used. Findings: There is moderately severe gastric distention with fluid and air and food particles. The gastric wall is thin. There are cholesterol stones without gallbladder or bile duct dilatation. There is no gallbladder wall thickening or pericholecystic fluid. There are no liver lesions. The spleen, pancreas, and adrenal glands appear normal. There are no renal lesions or hydronephrosis. Partially filled bladder appears grossly normal. There is moderate small bowel dilatation with air-fluid levels and mild small bowel wall  thickening. Small bowel measures up to approximately 5 cm transversely. There is a transition to normal caliber small bowel loops in the right lower quadrant.     Impression: Impression: Findings are highly suspicious for small bowel obstruction with transition point in the right lower quadrant. Cholelithiasis without acute cholecystitis. Electronically Signed: Estela Davis MD  11/15/2023 9:07 AM EST  Workstation ID: [id number]         Current medications:  Scheduled Meds:atenolol, 25 mg, Oral, Q12H  heparin (porcine), 5,000 Units, Subcutaneous, Q8H  [Held by provider] losartan, 50 mg, Oral, Q24H  pantoprazole, 40 mg, Oral, Q AM  QUEtiapine, 200 mg, Oral, Nightly  senna-docusate sodium, 2 tablet, Oral, BID  sodium chloride, 10 mL, Intravenous, Q12H      Continuous Infusions:lactated ringers, 125 mL/hr, Last Rate: 125 mL/hr (11/16/23 0924)      PRN Meds:.  senna-docusate sodium **AND** polyethylene glycol **AND** bisacodyl **AND** bisacodyl    Calcium Replacement - Follow Nurse / BPA Driven Protocol    HYDROmorphone **AND** naloxone    Magnesium Standard Dose Replacement - Follow Nurse / BPA Driven Protocol    Morphine **AND** naloxone    ondansetron **OR** ondansetron    oxyCODONE-acetaminophen    Phosphorus Replacement - Follow Nurse / BPA Driven Protocol    Potassium Replacement - Follow Nurse / BPA Driven Protocol    Sodium Chloride (PF)    [COMPLETED] Insert Peripheral IV **AND** sodium chloride    sodium chloride    sodium chloride    Assessment & Plan   Assessment & Plan     Active Hospital Problems    Diagnosis  POA    **SBO (small bowel obstruction) [K56.609]  Yes    Schizophrenia [F20.9]  Yes    Obesity, morbid, BMI 40.0-49.9 [E66.01]  Yes    HTN (hypertension) [I10]  Yes    S/P ORIF (open reduction internal fixation) fracture [Z98.890, Z87.81]  Not Applicable    Radial fracture [S52.90XA]  Yes      Resolved Hospital Problems   No resolved problems to display.        Brief Hospital Course to  date:  Stef Grey is a 33 y.o. male w BMI >45, schizophrenia, HTN, recent h/o ankle + radius ORIF (early October) who presents with 2 days of acute onset nausea/vomiting/worsening abdominal pain. Concern for SBO w transition point on CT on admission, s/p exploratory laparoscopy 11/15 without findings of SBO     Acute nausea/vomiting, ruled out SBO  -diet escalation per JESSICA Pascal now out  -monitor for BM    S/p ORIF ankle + radius  -f/u OP ortho. Transfers to wheelchair independently. NWB RLE + LUE     H/o palpitations - back on atenolol (clarify home dose)  Schizophrenia - seroquel  HTN - home olmesartan (formulary equivalent) on hold, restart when BP appropriate  BMI 46 - complicates care, increases DVT risk    Expected Discharge Location and Transportation: home  Expected Discharge 11/18 (Discharge date is tentative pending patient's medical condition and is subject to change)  Expected Discharge Date: 11/18/2023; Expected Discharge Time:      DVT prophylaxis:  Medical and mechanical DVT prophylaxis orders are present.     AM-PAC 6 Clicks Score (PT): 14 (11/16/23 7209)    CODE STATUS:   Code Status and Medical Interventions:   Ordered at: 11/15/23 1005     Level Of Support Discussed With:    Patient     Code Status (Patient has no pulse and is not breathing):    CPR (Attempt to Resuscitate)     Medical Interventions (Patient has pulse or is breathing):    Full Support     Comments:    mother as surrogate decision maker       Elle Grey MD  11/16/23
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
## 2023-11-16 NOTE — PLAN OF CARE
Goal Outcome Evaluation:  Plan of Care Reviewed With: patient        Progress: improving  Outcome Evaluation: VSS on 3L NC, medicated for pain q2 as ordered, rested intermittenlty. No other complaints voiced. Will continue with plan of care.

## 2023-11-16 NOTE — PLAN OF CARE
Goal Outcome Evaluation:  Plan of Care Reviewed With: patient, mother        Progress: no change  Outcome Evaluation: Pt presents with decreased functional independence and decreased activity tolerance with LUE and RLE NWB d/t recent surgeries. Pt completed SPT to w/c and toilet with min A x2, difficulty maintaining NWB of LUE during transfers. Recommend continued skilled IP PT interventions. Recommend D/C home with assist and HHPT.      Anticipated Discharge Disposition (PT): home with assist, home with home health
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
## 2023-11-17 PROCEDURE — 25010000002 HEPARIN (PORCINE) PER 1000 UNITS: Performed by: SURGERY

## 2023-11-17 PROCEDURE — 99232 SBSQ HOSP IP/OBS MODERATE 35: CPT | Performed by: INTERNAL MEDICINE

## 2023-11-17 PROCEDURE — 97530 THERAPEUTIC ACTIVITIES: CPT

## 2023-11-17 PROCEDURE — 25810000003 LACTATED RINGERS PER 1000 ML: Performed by: SURGERY

## 2023-11-17 PROCEDURE — 25010000002 HYDROMORPHONE PER 4 MG: Performed by: INTERNAL MEDICINE

## 2023-11-17 RX ORDER — HYDROCODONE BITARTRATE AND ACETAMINOPHEN 5; 325 MG/1; MG/1
1 TABLET ORAL EVERY 6 HOURS PRN
Status: DISCONTINUED | OUTPATIENT
Start: 2023-11-17 | End: 2023-11-18 | Stop reason: HOSPADM

## 2023-11-17 RX ORDER — DOCUSATE SODIUM 100 MG/1
100 CAPSULE, LIQUID FILLED ORAL 2 TIMES DAILY
Status: DISCONTINUED | OUTPATIENT
Start: 2023-11-17 | End: 2023-11-18 | Stop reason: HOSPADM

## 2023-11-17 RX ORDER — BISACODYL 5 MG/1
10 TABLET, DELAYED RELEASE ORAL DAILY
Status: DISCONTINUED | OUTPATIENT
Start: 2023-11-17 | End: 2023-11-18 | Stop reason: HOSPADM

## 2023-11-17 RX ORDER — ACETAMINOPHEN 325 MG/1
650 TABLET ORAL EVERY 6 HOURS PRN
Status: DISCONTINUED | OUTPATIENT
Start: 2023-11-17 | End: 2023-11-18 | Stop reason: HOSPADM

## 2023-11-17 RX ADMIN — DOCUSATE SODIUM 100 MG: 100 CAPSULE, LIQUID FILLED ORAL at 20:43

## 2023-11-17 RX ADMIN — DOCUSATE SODIUM 100 MG: 100 CAPSULE, LIQUID FILLED ORAL at 09:23

## 2023-11-17 RX ADMIN — QUETIAPINE FUMARATE 200 MG: 100 TABLET ORAL at 20:43

## 2023-11-17 RX ADMIN — HYDROMORPHONE HYDROCHLORIDE 0.5 MG: 1 INJECTION, SOLUTION INTRAMUSCULAR; INTRAVENOUS; SUBCUTANEOUS at 10:06

## 2023-11-17 RX ADMIN — Medication 10 ML: at 20:43

## 2023-11-17 RX ADMIN — BISACODYL 10 MG: 5 TABLET, COATED ORAL at 09:23

## 2023-11-17 RX ADMIN — HYDROMORPHONE HYDROCHLORIDE 0.5 MG: 1 INJECTION, SOLUTION INTRAMUSCULAR; INTRAVENOUS; SUBCUTANEOUS at 20:43

## 2023-11-17 RX ADMIN — HYDROMORPHONE HYDROCHLORIDE 0.5 MG: 1 INJECTION, SOLUTION INTRAMUSCULAR; INTRAVENOUS; SUBCUTANEOUS at 02:20

## 2023-11-17 RX ADMIN — HYDROMORPHONE HYDROCHLORIDE 0.5 MG: 1 INJECTION, SOLUTION INTRAMUSCULAR; INTRAVENOUS; SUBCUTANEOUS at 15:34

## 2023-11-17 RX ADMIN — Medication 10 ML: at 09:26

## 2023-11-17 RX ADMIN — PANTOPRAZOLE SODIUM 40 MG: 40 TABLET, DELAYED RELEASE ORAL at 05:21

## 2023-11-17 RX ADMIN — SODIUM CHLORIDE, POTASSIUM CHLORIDE, SODIUM LACTATE AND CALCIUM CHLORIDE 125 ML/HR: 600; 310; 30; 20 INJECTION, SOLUTION INTRAVENOUS at 02:20

## 2023-11-17 RX ADMIN — SODIUM CHLORIDE, POTASSIUM CHLORIDE, SODIUM LACTATE AND CALCIUM CHLORIDE 125 ML/HR: 600; 310; 30; 20 INJECTION, SOLUTION INTRAVENOUS at 09:30
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
## 2023-11-17 NOTE — CASE MANAGEMENT/SOCIAL WORK
Continued Stay Note  [facility]     Patient Name: Stef Grey  MRN: [id number]  Today's Date: 11/17/2023    Admit Date: 11/15/2023    Plan: home   Discharge Plan       Row Name 11/17/23 1100       Plan    Plan home    Patient/Family in Agreement with Plan yes    Plan Comments CM met with the patient at the bedside. He plans to return home at discharge and denied any needs for CM today. Patient has a ride home when medically ready. RN reports that patient is now on room air with a saturation of 91%. CM to follow and assist.    Final Discharge Disposition Code 01 - home or self-care                   Discharge Codes    No documentation.                 Expected Discharge Date and Time       Expected Discharge Date Expected Discharge Time    Nov 18, 2023               Kristie Camp, ANGELO

## 2023-11-17 NOTE — PROGRESS NOTES
"    [facility] Medicine Services  PROGRESS NOTE    Patient Name: Stef Grey  : 1990  MRN: [id number]    Date of Admission: 11/15/2023  Primary Care Physician: Sam Shepard MD    Subjective   Subjective     CC:  N/v/abdominal pain    HPI:  Patient tolerated full liquid diet in AM  Feeling much better  Passing flatus, no BMs  Mother called - reports friend with nausea symptoms who ended up diagnosed with \"blood cancer.\" Asked that he be tested for this - we discuss work-up to now and she is reassured      Objective   Objective     Vital Signs:   Temp:  [98 °F (36.7 °C)-99.2 °F (37.3 °C)] 98 °F (36.7 °C)  Heart Rate:  [81-99] 81  Resp:  [15-18] 15  BP: (125-155)/(68-83) 128/76  Flow (L/min):  [1] 1     Physical Exam:  Constitutional: No acute distress, awake, alert male lying in bed  HENT: NCAT, mucous membranes moist  Respiratory: Clear to auscultation bilaterally, respiratory effort normal   Cardiovascular: RRR, no murmurs, rubs, or gallops  Gastrointestinal: Soft, mild tenderness as expected around stitches, mild distension, +bowel sounds  Musculoskeletal: Hard cast RLE and LUE. Muscle tone within normal limits, no joint effusions appreciated  Psychiatric: Appropriate affect, cooperative  Neurologic: Alert and oriented, facial movements symmetric and spontaneous movement of all 4 extremities grossly equal bilaterally, speech clear  Skin: No rashes    Results Reviewed:  LAB RESULTS:      Lab 23  0404 11/15/23  1111 11/15/23  0754   WBC 12.45*  --  10.17   HEMOGLOBIN 14.7  --  15.1   HEMATOCRIT 46.5  --  46.7   PLATELETS 218  --  201   NEUTROS ABS 8.85*  --  7.32*   IMMATURE GRANS (ABS) 0.29*  --   --    LYMPHS ABS 1.45  --   --    MONOS ABS 1.67*  --   --    EOS ABS 0.09  --  0.31   MCV 86.8  --  84.9   PROCALCITONIN  --   --  0.58*   LACTATE  --  2.0 2.3*         Lab 23  0404 11/15/23  0754   SODIUM 139 136   POTASSIUM 4.1 4.0   CHLORIDE 102 99   CO2 " 24.0 23.0   ANION GAP 13.0 14.0   BUN 18 16   CREATININE 0.75* 0.79   EGFR 123.7 121.8   GLUCOSE 105* 143*   CALCIUM 9.7 9.8         Lab 11/16/23  0404 11/15/23  0754 11/13/23  1514   TOTAL PROTEIN 6.9 7.0  --    ALBUMIN 4.2 4.3  --    GLOBULIN 2.7 2.7  --    ALT (SGPT) 23 29  --    AST (SGOT) 19 25  --    BILIRUBIN 0.7 1.3*  --    ALK PHOS 70 70  --    LIPASE  --  11* 22                     Brief Urine Lab Results       None            Microbiology Results Abnormal       None            No radiology results from the last 24 hrs        Current medications:  Scheduled Meds:bisacodyl, 10 mg, Oral, Daily  docusate sodium, 100 mg, Oral, BID  heparin (porcine), 5,000 Units, Subcutaneous, Q8H  losartan, 50 mg, Oral, Q24H  pantoprazole, 40 mg, Oral, Q AM  QUEtiapine, 200 mg, Oral, Nightly  sodium chloride, 10 mL, Intravenous, Q12H      Continuous Infusions:     PRN Meds:.  acetaminophen    Calcium Replacement - Follow Nurse / BPA Driven Protocol    HYDROcodone-acetaminophen    HYDROmorphone **AND** naloxone    Magnesium Standard Dose Replacement - Follow Nurse / BPA Driven Protocol    ondansetron **OR** ondansetron    Phosphorus Replacement - Follow Nurse / BPA Driven Protocol    Potassium Replacement - Follow Nurse / BPA Driven Protocol    Sodium Chloride (PF)    [COMPLETED] Insert Peripheral IV **AND** sodium chloride    sodium chloride    sodium chloride    Assessment & Plan   Assessment & Plan     Active Hospital Problems    Diagnosis  POA    **SBO (small bowel obstruction) [K56.609]  Yes    Schizophrenia [F20.9]  Yes    Obesity, morbid, BMI 40.0-49.9 [E66.01]  Yes    HTN (hypertension) [I10]  Yes    S/P ORIF (open reduction internal fixation) fracture [Z98.890, Z87.81]  Not Applicable    Radial fracture [S52.90XA]  Yes      Resolved Hospital Problems   No resolved problems to display.        Brief Hospital Course to date:  Stef Grey is a 33 y.o. male w BMI >45, schizophrenia, HTN, recent h/o ankle + radius  ORIF (early October) who presents with 2 days of acute onset nausea/vomiting/worsening abdominal pain. Concern for SBO w transition point on CT on admission, s/p exploratory laparoscopy 11/15 without findings of SBO     Acute nausea/vomiting, ruled out SBO  -diet escalation per JESSICA Pascal now out  -monitor for BM    S/p ORIF ankle + radius  -f/u OP ortho. Transfers to wheelchair independently. NWB RLE + LUE     H/o palpitations - denies home atenolol use, stopped recently. D/c, ok to d/c tele  Schizophrenia - seroquel  HTN - home olmesartan (formulary equivalent)  BMI 46 - complicates care, increases DVT risk    Expected Discharge Location and Transportation: home  Expected Discharge 11/18 (Discharge date is tentative pending patient's medical condition and is subject to change)  Expected Discharge Date: 11/18/2023; Expected Discharge Time:      DVT prophylaxis:  Medical and mechanical DVT prophylaxis orders are present.     AM-PAC 6 Clicks Score (PT): 14 (11/17/23 0928)    CODE STATUS:   Code Status and Medical Interventions:   Ordered at: 11/15/23 1005     Level Of Support Discussed With:    Patient     Code Status (Patient has no pulse and is not breathing):    CPR (Attempt to Resuscitate)     Medical Interventions (Patient has pulse or is breathing):    Full Support     Comments:    mother as surrogate decision maker       Elle Grey MD  11/17/23

## 2023-11-17 NOTE — PLAN OF CARE
Goal Outcome Evaluation:         A&Ox4. VSS. RA-1L when sleeping. OOB with 1 assist and wheelchair. BM x1. Safety maintained. Tolerating diet.

## 2023-11-17 NOTE — PLAN OF CARE
Goal Outcome Evaluation:  Plan of Care Reviewed With: patient        Progress: no change  Outcome Evaluation: Pt continues to present with decreased functional mobility and decreased independence with transfers while maintaining NWB restrictions. Pt required min A x1 for all SPT this session. Continue to progress per pt tolerance.      Anticipated Discharge Disposition (PT): home with assist, home with home health

## 2023-11-17 NOTE — PROGRESS NOTES
"Patient Name:  Stef Grey  YOB: 1990  [id number]    Surgery Progress Note    Date of visit: 11/17/2023      Subjective: No acute events.  Tells me that he feels better.  Has passed flatus on multiple occasions.  Denies nausea or vomiting.  Denies fevers or chills.  Asking for an oral diet.  Has some pain only around the incision sites          Objective:     /74 (BP Location: Left arm, Patient Position: Lying)   Pulse 93   Temp 99.2 °F (37.3 °C) (Oral)   Resp 16   Ht 167.6 cm (66\")   Wt 135 kg (298 lb)   SpO2 90%   BMI 48.10 kg/m²     Intake/Output Summary (Last 24 hours) at 11/17/2023 0751  Last data filed at 11/17/2023 0700  Gross per 24 hour   Intake 100 ml   Output 950 ml   Net -850 ml       GEN:   Awake, alert, in no acute distress, resting comfortably in bed   CV:   Regular rate and rhythm  L:  Symmetric expansion, not labored on room air  Abd:  Soft, morbidly obese, appropriately tender palpation along incisions, incisions are clean dry intact  Ext:  No cyanosis, clubbing, or edema    Recent labs that are back at this time have been reviewed.           Assessment/ Plan:    Mr. Grey is a 33-year-old gentleman with history of hypertension, schizophrenia, ADHD, and morbid obesity who presents with concern for small bowel obstruction      #Enteritis, concern for small bowel obstruction  -Postop day 2 following diagnostic laparoscopy.  No evidence of surgical pathology on laparoscopy  -Given laparoscopic findings, presentation is more consistent with enteritis  -Advance diet as tolerated.  We will referral liquid diet today  -Supportive management of enteritis per primary team         Will Daley MD  11/17/2023  07:51 EST    Tolerated full liquids today and had a BM. Advance to regular diet this evening. If continues to tolerate a diet can be discharged from my standpoint tomorrow. Follow-up with me in 2 weeks    Dr. Karimi to follow for me this " weekend    Electronically signed by Will Daley MD, 11/17/23, 4:12 PM EST.

## 2023-11-18 VITALS
TEMPERATURE: 98.1 F | DIASTOLIC BLOOD PRESSURE: 88 MMHG | SYSTOLIC BLOOD PRESSURE: 135 MMHG | HEART RATE: 85 BPM | OXYGEN SATURATION: 91 % | HEIGHT: 66 IN | RESPIRATION RATE: 18 BRPM | WEIGHT: 298 LBS | BODY MASS INDEX: 47.89 KG/M2

## 2023-11-18 PROBLEM — K56.609 SBO (SMALL BOWEL OBSTRUCTION): Status: RESOLVED | Noted: 2023-11-15 | Resolved: 2023-11-18

## 2023-11-18 LAB
ANION GAP SERPL CALCULATED.3IONS-SCNC: 10 MMOL/L (ref 5–15)
BUN SERPL-MCNC: 10 MG/DL (ref 6–20)
BUN/CREAT SERPL: 16.4 (ref 7–25)
CALCIUM SPEC-SCNC: 9.2 MG/DL (ref 8.6–10.5)
CHLORIDE SERPL-SCNC: 101 MMOL/L (ref 98–107)
CO2 SERPL-SCNC: 29 MMOL/L (ref 22–29)
CREAT SERPL-MCNC: 0.61 MG/DL (ref 0.76–1.27)
DEPRECATED RDW RBC AUTO: 42.1 FL (ref 37–54)
EGFRCR SERPLBLD CKD-EPI 2021: 131.7 ML/MIN/1.73
ERYTHROCYTE [DISTWIDTH] IN BLOOD BY AUTOMATED COUNT: 13.4 % (ref 12.3–15.4)
GLUCOSE SERPL-MCNC: 86 MG/DL (ref 65–99)
HCT VFR BLD AUTO: 42.1 % (ref 37.5–51)
HGB BLD-MCNC: 13.4 G/DL (ref 13–17.7)
MCH RBC QN AUTO: 27.4 PG (ref 26.6–33)
MCHC RBC AUTO-ENTMCNC: 31.8 G/DL (ref 31.5–35.7)
MCV RBC AUTO: 86.1 FL (ref 79–97)
PLATELET # BLD AUTO: 156 10*3/MM3 (ref 140–450)
PMV BLD AUTO: 12.1 FL (ref 6–12)
POTASSIUM SERPL-SCNC: 3.6 MMOL/L (ref 3.5–5.2)
RBC # BLD AUTO: 4.89 10*6/MM3 (ref 4.14–5.8)
SODIUM SERPL-SCNC: 140 MMOL/L (ref 136–145)
WBC NRBC COR # BLD AUTO: 11.92 10*3/MM3 (ref 3.4–10.8)

## 2023-11-18 PROCEDURE — 99238 HOSP IP/OBS DSCHRG MGMT 30/<: CPT | Performed by: INTERNAL MEDICINE

## 2023-11-18 PROCEDURE — 80048 BASIC METABOLIC PNL TOTAL CA: CPT | Performed by: INTERNAL MEDICINE

## 2023-11-18 PROCEDURE — 85027 COMPLETE CBC AUTOMATED: CPT | Performed by: INTERNAL MEDICINE

## 2023-11-18 RX ORDER — HYDROCODONE BITARTRATE AND ACETAMINOPHEN 5; 325 MG/1; MG/1
1 TABLET ORAL EVERY 6 HOURS PRN
Qty: 4 TABLET | Refills: 0 | Status: SHIPPED | OUTPATIENT
Start: 2023-11-18

## 2023-11-18 RX ORDER — POTASSIUM CHLORIDE 20 MEQ/1
40 TABLET, EXTENDED RELEASE ORAL EVERY 4 HOURS
Status: COMPLETED | OUTPATIENT
Start: 2023-11-18 | End: 2023-11-18

## 2023-11-18 RX ADMIN — POTASSIUM CHLORIDE 40 MEQ: 1500 TABLET, EXTENDED RELEASE ORAL at 11:20

## 2023-11-18 RX ADMIN — Medication 10 ML: at 11:20

## 2023-11-18 RX ADMIN — LOSARTAN POTASSIUM 50 MG: 50 TABLET, FILM COATED ORAL at 08:36

## 2023-11-18 RX ADMIN — BISACODYL 10 MG: 5 TABLET, COATED ORAL at 08:36

## 2023-11-18 RX ADMIN — DOCUSATE SODIUM 100 MG: 100 CAPSULE, LIQUID FILLED ORAL at 08:35

## 2023-11-18 RX ADMIN — PANTOPRAZOLE SODIUM 40 MG: 40 TABLET, DELAYED RELEASE ORAL at 05:48

## 2023-11-18 RX ADMIN — POTASSIUM CHLORIDE 40 MEQ: 1500 TABLET, EXTENDED RELEASE ORAL at 07:43

## 2023-11-18 NOTE — PLAN OF CARE
Problem: Adult Inpatient Plan of Care  Goal: Plan of Care Review  Outcome: Ongoing, Progressing  Goal: Patient-Specific Goal (Individualized)  Outcome: Ongoing, Progressing  Goal: Absence of Hospital-Acquired Illness or Injury  Outcome: Ongoing, Progressing  Intervention: Identify and Manage Fall Risk  Recent Flowsheet Documentation  Taken 11/18/2023 1200 by Desirae Rubio RN  Safety Promotion/Fall Prevention:   activity supervised   assistive device/personal items within reach   clutter free environment maintained   room organization consistent   safety round/check completed  Taken 11/18/2023 0836 by Desirae Rubio RN  Safety Promotion/Fall Prevention:   activity supervised   assistive device/personal items within reach   clutter free environment maintained   room organization consistent   safety round/check completed  Intervention: Prevent Skin Injury  Recent Flowsheet Documentation  Taken 11/18/2023 1200 by Desirae Rubio RN  Body Position: position changed independently  Skin Protection:   adhesive use limited   incontinence pads utilized   tubing/devices free from skin contact   transparent dressing maintained  Taken 11/18/2023 0836 by Desirae Rubio RN  Body Position: position changed independently  Skin Protection:   adhesive use limited   incontinence pads utilized   tubing/devices free from skin contact   transparent dressing maintained  Intervention: Prevent and Manage VTE (Venous Thromboembolism) Risk  Recent Flowsheet Documentation  Taken 11/18/2023 1200 by Desirae Rubio RN  Activity Management: activity encouraged  Taken 11/18/2023 0836 by Desirae Rubio RN  Activity Management: bedrest  VTE Prevention/Management: sequential compression devices off  Range of Motion: active ROM (range of motion) encouraged  Intervention: Prevent Infection  Recent Flowsheet Documentation  Taken 11/18/2023 1200 by Desirae Rubio RN  Infection Prevention: environmental surveillance performed  Taken  11/18/2023 0836 by Desirae Rubio RN  Infection Prevention: environmental surveillance performed  Goal: Optimal Comfort and Wellbeing  Outcome: Ongoing, Progressing  Intervention: Monitor Pain and Promote Comfort  Recent Flowsheet Documentation  Taken 11/18/2023 0836 by Desirae Rubio RN  Pain Management Interventions: see MAR  Intervention: Provide Person-Centered Care  Recent Flowsheet Documentation  Taken 11/18/2023 0836 by Desirae Rubio RN  Trust Relationship/Rapport:   care explained   choices provided  Goal: Readiness for Transition of Care  Outcome: Ongoing, Progressing   Goal Outcome Evaluation:

## 2023-11-18 NOTE — DISCHARGE SUMMARY
[facility] Medicine Services  DISCHARGE SUMMARY    Patient Name: Stef Grey  : 1990  MRN: [id number]    Date of Admission: 11/15/2023  7:25 AM  Date of Discharge:    Primary Care Physician: Sam Shepard MD    Consults       Date and Time Order Name Status Description    11/15/2023 10:02 AM Inpatient General Surgery Consult Completed             Hospital Course     Presenting Problem: nausea/vomiting    Active Hospital Problems    Diagnosis  POA    Schizophrenia [F20.9]  Yes    Obesity, morbid, BMI 40.0-49.9 [E66.01]  Yes    HTN (hypertension) [I10]  Yes    S/P ORIF (open reduction internal fixation) fracture [Z98.890, Z87.81]  Not Applicable    Radial fracture [S52.90XA]  Yes      Resolved Hospital Problems    Diagnosis Date Resolved POA    **SBO (small bowel obstruction) [K56.609] 2023 Yes          Hospital Course:  Stef Grey is a 33 y.o. male w BMI >45, schizophrenia, HTN, recent h/o ankle + radius ORIF (early October) who presents with 2 days of acute onset nausea/vomiting/worsening abdominal pain. Concern for SBO w transition point on CT on admission, s/p exploratory laparoscopy 11/15 w Dr Daley withOUT findings of SBO  Patient's abdominal pain much improved after exploration. Advanced diet which was well tolerated, had bowel movements, will discharge home     Discharge Follow Up Recommendations for outpatient labs/diagnostics:   F/u Dr Daley 2 weeks    Day of Discharge     HPI:   Patient feels much better  2 bowel movements today, tolerated breakfast and lunch, anxious to get home      Vital Signs:   Temp:  [97.1 °F (36.2 °C)-99 °F (37.2 °C)] 98.1 °F (36.7 °C)  Heart Rate:  [77-85] 85  Resp:  [16-18] 18  BP: (131-161)/(77-88) 135/88  Flow (L/min):  [1-1.5] 1.5      Physical Exam:  Constitutional: No acute distress, awake, alert  HENT: NCAT, mucous membranes moist  Respiratory: Clear to auscultation bilaterally, respiratory  effort normal   Cardiovascular: RRR, no murmurs, rubs, or gallops  Gastrointestinal: Soft, no distension, mild tenderness as expected at lap scar areas  Musculoskeletal: RLE hard cast, LUE splint off. Muscle tone within normal limits, no joint effusions appreciated  Psychiatric: Appropriate affect, cooperative  Neurologic: Alert and oriented, facial movements symmetric and spontaneous movement of all 4 extremities grossly equal bilaterally, speech clear  Skin: No rashes    Pertinent  and/or Most Recent Results     LAB RESULTS:      Lab 11/18/23  0417 11/16/23  0404 11/15/23  1111 11/15/23  0754   WBC 11.92* 12.45*  --  10.17   HEMOGLOBIN 13.4 14.7  --  15.1   HEMATOCRIT 42.1 46.5  --  46.7   PLATELETS 156 218  --  201   NEUTROS ABS  --  8.85*  --  7.32*   IMMATURE GRANS (ABS)  --  0.29*  --   --    LYMPHS ABS  --  1.45  --   --    MONOS ABS  --  1.67*  --   --    EOS ABS  --  0.09  --  0.31   MCV 86.1 86.8  --  84.9   PROCALCITONIN  --   --   --  0.58*   LACTATE  --   --  2.0 2.3*         Lab 11/18/23 0417 11/16/23 0404 11/15/23  0754   SODIUM 140 139 136   POTASSIUM 3.6 4.1 4.0   CHLORIDE 101 102 99   CO2 29.0 24.0 23.0   ANION GAP 10.0 13.0 14.0   BUN 10 18 16   CREATININE 0.61* 0.75* 0.79   EGFR 131.7 123.7 121.8   GLUCOSE 86 105* 143*   CALCIUM 9.2 9.7 9.8         Lab 11/16/23  0404 11/15/23  0754 11/13/23  1514   TOTAL PROTEIN 6.9 7.0  --    ALBUMIN 4.2 4.3  --    GLOBULIN 2.7 2.7  --    ALT (SGPT) 23 29  --    AST (SGOT) 19 25  --    BILIRUBIN 0.7 1.3*  --    ALK PHOS 70 70  --    LIPASE  --  11* 22                     Brief Urine Lab Results       None          Microbiology Results (last 10 days)       ** No results found for the last 240 hours. **            XR Abdomen KUB    Result Date: 11/15/2023  XR ABDOMEN KUB Date of Exam: 11/15/2023 11:13 AM EST Indication: s/p NGT placement Comparison: None available. Findings: A nasogastric tube is present with its tip at least within the stomach. There are some  distended small bowel loops in what is visualized of the abdomen which could relate to small bowel obstruction.     Impression: 1.Abnormal dilated small bowel loops which could relate to small bowel obstruction. Electronically Signed: Casa Paz MD  11/15/2023 11:36 AM EST  Workstation ID: [id number]    CT Abdomen Pelvis With Contrast    Result Date: 11/15/2023  CT ABDOMEN PELVIS W CONTRAST Date of Exam: 11/15/2023 8:56 AM EST Indication: Left-sided abdominal tenderness. Comparison: None available. Technique: Axial CT images were obtained of the abdomen and pelvis following the uneventful intravenous administration of 94 mL Isovue-300. Reconstructed coronal and sagittal images were also obtained. Automated exposure control and iterative construction methods were used. Findings: There is moderately severe gastric distention with fluid and air and food particles. The gastric wall is thin. There are cholesterol stones without gallbladder or bile duct dilatation. There is no gallbladder wall thickening or pericholecystic fluid. There are no liver lesions. The spleen, pancreas, and adrenal glands appear normal. There are no renal lesions or hydronephrosis. Partially filled bladder appears grossly normal. There is moderate small bowel dilatation with air-fluid levels and mild small bowel wall thickening. Small bowel measures up to approximately 5 cm transversely. There is a transition to normal caliber small bowel loops in the right lower quadrant.     Impression: Findings are highly suspicious for small bowel obstruction with transition point in the right lower quadrant. Cholelithiasis without acute cholecystitis. Electronically Signed: Estela Davis MD  11/15/2023 9:07 AM EST  Workstation ID: [id number]    CT Abdomen Pelvis With Contrast    Result Date: 11/13/2023  CT SCAN OF THE ABDOMEN AND PELVIS WITH CONTRAST    11/13/2023 3:45 PM HISTORY: Acute epigastric pain with nausea and vomiting. COMPARISON: None. PROCEDURE:  The patient was injected with IV contrast. Axial images were obtained from the lung bases to the pubic symphysis by computed tomography. This study was performed with techniques to keep radiation doses as low as reasonably achievable, (ALARA). Individualized dose reduction techniques using automated exposure control or adjustment of mA and/or kV according to the patient size were employed. FINDINGS: ABDOMEN: The lung bases are clear. The heart is normal in size. The liver is homogenous with no focal abnormality. Stones are identified in the gallbladder. The spleen is unremarkable. No adrenal mass is present.  The pancreas is unremarkable. The kidneys are unremarkable, without evidence of mass or hydronephrosis. The aorta is normal in caliber. There is no free fluid or adenopathy. Stomach is distended. PELVIS: The GI tract demonstrates no obstruction.  The appendix is normal. The urinary bladder is unremarkable. There is no free fluid, adenopathy, or inflammatory process.    Cholelithiasis. Images reviewed, interpreted, and dictated by Dr. ES Ghosh. Transcribed by Deondre Taylor PA-C                 Plan for Follow-up of Pending Labs/Results: n/a    Discharge Details        Discharge Medications        New Medications        Instructions Start Date   HYDROcodone-acetaminophen 5-325 MG per tablet  Commonly known as: NORCO   1 tablet, Oral, Every 6 Hours PRN             Continue These Medications        Instructions Start Date   acetaminophen 500 MG tablet  Commonly known as: TYLENOL   500 mg, Oral, Every 6 Hours PRN      atomoxetine 80 MG capsule  Commonly known as: STRATTERA   80 mg, Oral, Daily      clobetasol 0.05 % ointment  Commonly known as: TEMOVATE   1 application , Topical, 2 Times Daily      olmesartan 20 MG tablet  Commonly known as: BENICAR   20 mg, Oral, Daily      ondansetron ODT 4 MG disintegrating tablet  Commonly known as: ZOFRAN-ODT   4 mg, Translingual, Every 8 Hours PRN      QUEtiapine 200  MG tablet  Commonly known as: SEROquel   200 mg, Oral, Nightly               Allergies   Allergen Reactions    Albuterol Palpitations         Discharge Disposition:  Home or Self Care    Diet:  Hospital:  Diet Order   Procedures    Diet: Regular/House Diet; Fluid Consistency: Thin (IDDSI 0)       Diet Instructions       Diet: Regular/House Diet; Thin (IDDSI 0)      Discharge Diet: Regular/House Diet    Fluid Consistency: Thin (IDDSI 0)             Activity:      Restrictions or Other Recommendations:       CODE STATUS:    Code Status and Medical Interventions:   Ordered at: 11/15/23 1005     Level Of Support Discussed With:    Patient     Code Status (Patient has no pulse and is not breathing):    CPR (Attempt to Resuscitate)     Medical Interventions (Patient has pulse or is breathing):    Full Support     Comments:    mother as surrogate decision maker       No future appointments.    Additional Instructions for the Follow-ups that You Need to Schedule       Discharge Follow-up with PCP   As directed       Currently Documented PCP:    Sam Shepard MD    PCP Phone Number:    [phone]     Follow Up Details: 1 week        Discharge Follow-up with Specified Provider: Dr Daley; 2 Weeks   As directed      To: Dr Daley   Follow Up: 2 Weeks                      Elle Grey MD  11/18/23      Time Spent on Discharge:  I spent  20 minutes on this discharge activity which included: face-to-face encounter with the patient, reviewing the data in the system, coordination of the care with the nursing staff as well as consultants, documentation, and entering orders.

## 2023-11-18 NOTE — PLAN OF CARE
Problem: Adult Inpatient Plan of Care  Goal: Plan of Care Review  Outcome: Ongoing, Progressing  Goal: Patient-Specific Goal (Individualized)  Outcome: Ongoing, Progressing  Goal: Absence of Hospital-Acquired Illness or Injury  Outcome: Ongoing, Progressing  Intervention: Identify and Manage Fall Risk  Description: Perform standard risk assessment on admission using a validated tool or comprehensive approach appropriate to the patient; reassess fall risk frequently, with change in status or transfer to another level of care.  Communicate fall injury risk to interprofessional healthcare team.  Determine need for increased observation, equipment and environmental modification, such as low bed, signage and supportive, nonskid footwear.  Adjust safety measures to individual developmental age, stage and identified risk factors.  Reinforce the importance of safety and physical activity with patient and family.  Perform regular intentional rounding to assess need for position change, pain assessment and personal needs, including assistance with toileting.  Recent Flowsheet Documentation  Taken 11/18/2023 0200 by Nata Robin, RN  Safety Promotion/Fall Prevention:   activity supervised   assistive device/personal items within reach   clutter free environment maintained   fall prevention program maintained   lighting adjusted   mobility aid in reach   nonskid shoes/slippers when out of bed   room organization consistent   safety round/check completed  Taken 11/18/2023 0000 by Nata Robin, RN  Safety Promotion/Fall Prevention:   activity supervised   assistive device/personal items within reach   clutter free environment maintained   fall prevention program maintained   lighting adjusted   mobility aid in reach   nonskid shoes/slippers when out of bed   safety round/check completed   room organization consistent  Taken 11/17/2023 2200 by Nata Robin, RN  Safety Promotion/Fall Prevention:   activity supervised    assistive device/personal items within reach   clutter free environment maintained   fall prevention program maintained   lighting adjusted   mobility aid in reach   nonskid shoes/slippers when out of bed   room organization consistent   safety round/check completed  Taken 11/17/2023 2043 by Nata Robin RN  Safety Promotion/Fall Prevention:   activity supervised   assistive device/personal items within reach   clutter free environment maintained   fall prevention program maintained   lighting adjusted   mobility aid in reach   nonskid shoes/slippers when out of bed   room organization consistent   safety round/check completed  Intervention: Prevent Skin Injury  Description: Perform a screening for skin injury risk, such as pressure or moisture associated skin damage on admission and at regular intervals throughout hospital stay.  Keep all areas of skin (especially folds) clean and dry.  Maintain adequate skin hydration.  Relieve and redistribute pressure and protect bony prominences; implement measures based on patient-specific risk factors.  Match turning and repositioning schedule to clinical condition.  Encourage weight shift frequently; assist with reposition if unable to complete independently.  Float heels off bed; avoid pressure on the Achilles tendon.  Keep skin free from extended contact with medical devices.  Encourage functional activity and mobility, as early as tolerated.  Use aids (e.g., slide boards, mechanical lift) during transfer.  Recent Flowsheet Documentation  Taken 11/18/2023 0400 by Nata Robin, RN  Body Position: weight shifting  Skin Protection:   adhesive use limited   tubing/devices free from skin contact   transparent dressing maintained   skin-to-device areas padded   skin-to-skin areas padded   silicone foam dressing in place   skin sealant/moisture barrier applied  Taken 11/18/2023 0200 by Nata Robin, RN  Body Position: weight shifting  Skin Protection:   adhesive use  limited   tubing/devices free from skin contact   transparent dressing maintained   skin-to-skin areas padded   skin-to-device areas padded   incontinence pads utilized   skin sealant/moisture barrier applied  Taken 11/18/2023 0000 by Nata Robin RN  Body Position: position changed independently  Skin Protection:   adhesive use limited   tubing/devices free from skin contact   transparent dressing maintained   skin-to-skin areas padded   skin-to-device areas padded   incontinence pads utilized   skin sealant/moisture barrier applied  Taken 11/17/2023 2200 by Nata Robin RN  Body Position: position changed independently  Skin Protection:   adhesive use limited   tubing/devices free from skin contact   transparent dressing maintained   skin-to-device areas padded   skin-to-skin areas padded   incontinence pads utilized   skin sealant/moisture barrier applied  Taken 11/17/2023 2043 by Nata Robin RN  Body Position: position changed independently  Skin Protection:   adhesive use limited   tubing/devices free from skin contact   transparent dressing maintained   skin-to-skin areas padded   skin-to-device areas padded   incontinence pads utilized   skin sealant/moisture barrier applied  Intervention: Prevent and Manage VTE (Venous Thromboembolism) Risk  Description: Assess for VTE (venous thromboembolism) risk.  Encourage and assist with early ambulation.  Initiate and maintain compression or other therapy, as indicated, based on identified risk in accordance with organizational protocol and provider order.  Encourage both active and passive leg exercises while in bed, if unable to ambulate.  Recent Flowsheet Documentation  Taken 11/18/2023 0400 by Nata Robin, RN  Activity Management: bedrest  Taken 11/18/2023 0200 by Nata Robin RN  Activity Management: bedrest  Taken 11/18/2023 0000 by Nata Robin RN  Activity Management: activity encouraged  Taken 11/17/2023 2200 by Nata Robin, RN  Activity  Management: bedrest  Taken 11/17/2023 2043 by Nata Robin RN  Activity Management: bedrest  VTE Prevention/Management:   left   sequential compression devices on  Intervention: Prevent Infection  Description: Maintain skin and mucous membrane integrity; promote hand, oral and pulmonary hygiene.  Optimize fluid balance, nutrition, sleep and glycemic control to maximize infection resistance.  Identify potential sources of infection early to prevent or mitigate progression of infection (e.g., wound, lines, devices).  Evaluate ongoing need for invasive devices; remove promptly when no longer indicated.  Recent Flowsheet Documentation  Taken 11/17/2023 2043 by Nata Robin RN  Infection Prevention:   environmental surveillance performed   equipment surfaces disinfected   hand hygiene promoted   personal protective equipment utilized   rest/sleep promoted  Goal: Optimal Comfort and Wellbeing  Outcome: Ongoing, Progressing  Intervention: Provide Person-Centered Care  Description: Use a family-focused approach to care.  Develop trust and rapport by proactively providing information, encouraging questions, addressing concerns and offering reassurance.  Acknowledge emotional response to hospitalization.  Recognize and utilize personal coping strategies.  Honor spiritual and cultural preferences.  Recent Flowsheet Documentation  Taken 11/17/2023 2043 by Nata Robin RN  Trust Relationship/Rapport:   care explained   emotional support provided   choices provided   empathic listening provided   questions answered   questions encouraged   thoughts/feelings acknowledged  Goal: Readiness for Transition of Care  Outcome: Ongoing, Progressing     Problem: Bleeding (Cholecystectomy)  Goal: Absence of Bleeding  Outcome: Ongoing, Progressing     Problem: Bowel Motility Impaired (Cholecystectomy)  Goal: Effective Bowel Elimination  Outcome: Ongoing, Progressing     Problem: Fluid and Electrolyte Imbalance  (Cholecystectomy)  Goal: Fluid and Electrolyte Balance  Outcome: Ongoing, Progressing     Problem: Infection (Cholecystectomy)  Goal: Absence of Infection Signs and Symptoms  Outcome: Ongoing, Progressing     Problem: Ongoing Anesthesia Effects (Cholecystectomy)  Goal: Anesthesia/Sedation Recovery  Outcome: Ongoing, Progressing  Intervention: Optimize Anesthesia Recovery  Description: Assess and monitor airway, breathing and circulation; maintain close surveillance for deterioration.  Implement continuous monitoring, such as cardiorespiratory, blood pressure, temperature, pulse oximetry and capnography.  Elevate head of bed, if able; facilitate regular position changes.  Assess neurocognitive function and for risks that may lead to postoperative delirium, such as decreased level of consciousness, pain and agitation; offer reassurance; answer questions.  Assess and monitor neurovascular and neuromuscular function, such as motor strength, tone, posture, peripheral pulses and extremity sensation; protect areas of decreased sensation from heat, cold, medical devices or objects.  Individualize frequency and intensity of monitoring based on sedation or anesthesia administered, identified risk factors, ongoing assessment and organizational protocol.  Prepare for administration of pharmacologic therapy, such as reversal agent, antiemetic or antipruritic medication, to manage sedation or anesthesia effects.  Adjust environment to maintain safety (e.g., fall precautions, safety equipment).  Recent Flowsheet Documentation  Taken 11/18/2023 0200 by Nata Robin, RN  Safety Promotion/Fall Prevention:   activity supervised   assistive device/personal items within reach   clutter free environment maintained   fall prevention program maintained   lighting adjusted   mobility aid in reach   nonskid shoes/slippers when out of bed   room organization consistent   safety round/check completed  Taken 11/18/2023 0000 by Nata Robin,  RN  Safety Promotion/Fall Prevention:   activity supervised   assistive device/personal items within reach   clutter free environment maintained   fall prevention program maintained   lighting adjusted   mobility aid in reach   nonskid shoes/slippers when out of bed   safety round/check completed   room organization consistent  Taken 11/17/2023 2200 by Nata Robin RN  Safety Promotion/Fall Prevention:   activity supervised   assistive device/personal items within reach   clutter free environment maintained   fall prevention program maintained   lighting adjusted   mobility aid in reach   nonskid shoes/slippers when out of bed   room organization consistent   safety round/check completed  Taken 11/17/2023 2043 by Nata Robin RN  Safety Promotion/Fall Prevention:   activity supervised   assistive device/personal items within reach   clutter free environment maintained   fall prevention program maintained   lighting adjusted   mobility aid in reach   nonskid shoes/slippers when out of bed   room organization consistent   safety round/check completed     Problem: Pain (Cholecystectomy)  Goal: Acceptable Pain Control  Outcome: Ongoing, Progressing     Problem: Postoperative Nausea and Vomiting (Cholecystectomy)  Goal: Nausea and Vomiting Relief  Outcome: Ongoing, Progressing     Problem: Postoperative Urinary Retention (Cholecystectomy)  Goal: Effective Urinary Elimination  Outcome: Ongoing, Progressing     Problem: Respiratory Compromise (Cholecystectomy)  Goal: Effective Oxygenation and Ventilation  Outcome: Ongoing, Progressing  Intervention: Optimize Oxygenation and Ventilation  Description: Recognize risk for obstructive sleep apnea; anticipate the need for continuous pulse oximetry and noninvasive positive pressure ventilation.  Maintain patent airway with positioning, airway adjuncts and secretion clearance.  Encourage pulmonary hygiene, such as cough-enhancement and airway-clearance techniques, that may  include use of incentive spirometry, deep breathing and cough.  Anticipate the need for splinting with cough to minimize discomfort; assist if needed.  Provide oxygen therapy judiciously, if hypoxemia present.  Consider pharmacologic therapy that may improve mucus clearance and reduce bronchospasm, laryngospasm, swelling or stridor.  Consider the need for intubation and invasive positive pressure ventilation for longer recovery needs; use lung protective measures.  Recent Flowsheet Documentation  Taken 11/18/2023 0000 by Nata Robin RN  Head of Bed (HOB) Positioning: HOB at 30-45 degrees  Taken 11/17/2023 2200 by Nata Robin RN  Head of Bed (HOB) Positioning: HOB at 30-45 degrees  Taken 11/17/2023 2043 by Nata Robin RN  Head of Bed (HOB) Positioning: HOB at 30-45 degrees     Problem: Skin Injury Risk Increased  Goal: Skin Health and Integrity  Outcome: Ongoing, Progressing  Intervention: Optimize Skin Protection  Description: Perform a full pressure injury risk assessment, as indicated by screening, upon admission to care unit.  Reassess skin (injury risk, full inspection) frequently (e.g., scheduled interval, with change in condition) to provide optimal early detection and prevention.  Maintain adequate tissue perfusion (e.g., encourage fluid balance; avoid crossing legs, constrictive clothing or devices) to promote tissue oxygenation.  Maintain head of bed at lowest degree of elevation tolerated, considering medical condition and other restrictions.  Avoid positioning onto an area that remains reddened.  Minimize incontinence and moisture (e.g., toileting schedule; moisture-wicking pad, diaper or incontinence collection device; skin moisture barrier).  Cleanse skin promptly and gently when soiled utilizing a pH-balanced cleanser.  Relieve and redistribute pressure (e.g., scheduled position changes, weight shifts, use of support surface, medical device repositioning, protective dressing application,  use of positioning device, microclimate control, use of pressure-injury-monitor  Encourage increased activity, such as sitting in a chair at the bedside or early mobilization, when able to tolerate.  Recent Flowsheet Documentation  Taken 11/18/2023 0400 by Nata Robin RN  Pressure Reduction Techniques:   frequent weight shift encouraged   weight shift assistance provided   heels elevated off bed   positioned off wounds   pressure points protected  Pressure Reduction Devices:   pressure-redistributing mattress utilized   heel offloading device utilized   positioning supports utilized  Skin Protection:   adhesive use limited   tubing/devices free from skin contact   transparent dressing maintained   skin-to-device areas padded   skin-to-skin areas padded   silicone foam dressing in place   skin sealant/moisture barrier applied  Taken 11/18/2023 0200 by Nata Robin RN  Pressure Reduction Techniques:   frequent weight shift encouraged   weight shift assistance provided   heels elevated off bed   positioned off wounds   pressure points protected  Pressure Reduction Devices:   pressure-redistributing mattress utilized   positioning supports utilized   heel offloading device utilized  Skin Protection:   adhesive use limited   tubing/devices free from skin contact   transparent dressing maintained   skin-to-skin areas padded   skin-to-device areas padded   incontinence pads utilized   skin sealant/moisture barrier applied  Taken 11/18/2023 0000 by Nata Robin RN  Pressure Reduction Techniques:   frequent weight shift encouraged   weight shift assistance provided   heels elevated off bed   positioned off wounds   pressure points protected  Head of Bed (HOB) Positioning: HOB at 30-45 degrees  Pressure Reduction Devices:   pressure-redistributing mattress utilized   positioning supports utilized   heel offloading device utilized  Skin Protection:   adhesive use limited   tubing/devices free from skin contact    transparent dressing maintained   skin-to-skin areas padded   skin-to-device areas padded   incontinence pads utilized   skin sealant/moisture barrier applied  Taken 11/17/2023 2200 by Nata Robin RN  Pressure Reduction Techniques:   frequent weight shift encouraged   weight shift assistance provided   heels elevated off bed   positioned off wounds   pressure points protected  Head of Bed (HOB) Positioning: HOB at 30-45 degrees  Pressure Reduction Devices:   pressure-redistributing mattress utilized   positioning supports utilized   heel offloading device utilized  Skin Protection:   adhesive use limited   tubing/devices free from skin contact   transparent dressing maintained   skin-to-device areas padded   skin-to-skin areas padded   incontinence pads utilized   skin sealant/moisture barrier applied  Taken 11/17/2023 2043 by Nata Robin RN  Pressure Reduction Techniques:   frequent weight shift encouraged   weight shift assistance provided   heels elevated off bed   positioned off wounds   pressure points protected  Head of Bed (HOB) Positioning: HOB at 30-45 degrees  Pressure Reduction Devices:   pressure-redistributing mattress utilized   positioning supports utilized   heel offloading device utilized  Skin Protection:   adhesive use limited   tubing/devices free from skin contact   transparent dressing maintained   skin-to-skin areas padded   skin-to-device areas padded   incontinence pads utilized   skin sealant/moisture barrier applied   Goal Outcome Evaluation:

## 2023-11-19 ENCOUNTER — READMISSION MANAGEMENT (OUTPATIENT)
Dept: CALL CENTER | Facility: HOSPITAL | Age: 33
End: 2023-11-19
Payer: COMMERCIAL

## 2023-11-19 NOTE — OUTREACH NOTE
Prep Survey      Flowsheet Row Responses   [facility] facility patient discharged from? [facility]   Is LACE score < 7 ? No   Eligibility Readm Mgmt   Discharge diagnosis Schizophrenia  r/o SBO abdominal pin   Does the patient have one of the following disease processes/diagnoses(primary or secondary)? Other   Does the patient have Home health ordered? No   Is there a DME ordered? No   Prep survey completed? Yes            ADEBAYO VALVERDE - Registered Nurse

## 2023-11-22 ENCOUNTER — OFFICE VISIT (OUTPATIENT)
Dept: ORTHOPEDIC SURGERY | Facility: CLINIC | Age: 33
End: 2023-11-22
Payer: COMMERCIAL

## 2023-11-22 ENCOUNTER — READMISSION MANAGEMENT (OUTPATIENT)
Dept: CALL CENTER | Facility: HOSPITAL | Age: 33
End: 2023-11-22
Payer: COMMERCIAL

## 2023-11-22 DIAGNOSIS — Z87.81 S/P ORIF (OPEN REDUCTION INTERNAL FIXATION) FRACTURE: Primary | ICD-10-CM

## 2023-11-22 DIAGNOSIS — Z09 SURGERY FOLLOW-UP: ICD-10-CM

## 2023-11-22 DIAGNOSIS — Z98.890 S/P ORIF (OPEN REDUCTION INTERNAL FIXATION) FRACTURE: Primary | ICD-10-CM

## 2023-11-22 NOTE — PATIENT INSTRUCTIONS
"COMPRESSION SOCKS:  Graduated Pressure 15-20 mmHg, 8-15 mmHg    Over the Counter and good for everyday. No medical prescription needed.           [facility] - [address], [place] [address], Phone: [phone]    [facility] - Online or In-store - closest [facility], [address], [place] [address]    Screen[employer] - type in \"compression stockings\"    Tips for using compression stockings  Here are tips for using compression stockings:   Wash new compression stockings before wearing them to reduce their stiffness and to make them easier to put on.   Put on stockings as early as possible in the morning after you bandage any sores you have because swelling is less in the morning. If you do not put the stockings on early, raise your legs for 20 to 30 minutes before putting the stockings on.   When putting on stockings, sit on a chair with firm back support (not on the bed).   Knee-high stockings can be put on using the \"heel-pocket-out method.\" The heel-pocket-out method to put on compression stockings is as follows:  1. Turn the leg part of the stocking inside-out down to the heel.  2. Put your foot into the stocking, hold onto the folded edge, and pull the stocking onto your foot and over the heel.  3. Gently work the stocking up your leg by turning it right-side out.  Some people find it helpful to wear rubber gloves to put their stockings on. This can make it easier to slide the stockings up the legs.   Heavy compression stockings may go on more easily if light silk pantyhose are worn under the compression stockings, or if you first put powder on your legs.   Skin moisturizers and treatments that are used to treat open sores can make the stockings dirty and wear them out. Wash the stockings each day after wearing them if possible. Stockings can be washed in cold water by hand. You can also wash them with cold water and a small amount of mild detergent in a washing " "machine. Hang the stockings up to dry, and do not dry them in a machine. Buying at least two pairs of stockings at a time will let you wear one pair while the other pair is drying.  If you have an allergy to rubber (latex), you can buy compression stockings without elastic.       Even Up          Available on [employer], type in \"even up for walking boot\"   "

## 2023-11-22 NOTE — PROGRESS NOTES
[facility] Orthopedic     Follow-up Office Visit       Date: 11/22/2023   Patient Name: Stef Grey  MRN: [id number]  YOB: 1990    Chief Complaint:   Chief Complaint   Patient presents with    Post-op     4 week follow up --7 weeks s/p left open reduction internal fixation distal radius (DOS 10/5/23)       History of Present Illness:   Stef Grey is a 33 y.o. male status post open reduction internal fixation of comminuted left distal radius fracture on 10/5/2023.  Patient reports that his left wrist has been doing well since his last visit.  He reports that he has been bothered by the radial styloid pin as it is irritating.  He denies numbness or tingling.  He has been compliant with his brace he has not been working with hand therapy.      Subjective   Review of Systems:   Review of Systems   Constitutional:  Negative for chills, fever, unexpected weight gain and unexpected weight loss.   HENT:  Negative for congestion, postnasal drip and rhinorrhea.    Eyes:  Negative for blurred vision.   Respiratory:  Negative for shortness of breath.    Cardiovascular:  Negative for leg swelling.   Gastrointestinal:  Negative for abdominal pain, nausea and vomiting.   Genitourinary:  Negative for difficulty urinating.   Musculoskeletal:  Positive for arthralgias. Negative for gait problem, joint swelling and myalgias.   Skin:  Negative for skin lesions and wound.   Neurological:  Negative for dizziness, weakness, light-headedness and numbness.   Hematological:  Does not bruise/bleed easily.   Psychiatric/Behavioral:  Negative for depressed mood.         Pertinent review of systems per HPI    I reviewed the patient's chief complaint, history of present illness, review of systems, past medical history, surgical history, family history, social history, medications and allergy list in the EMR on 11/22/2023 and agree with the  findings above.    Objective    Vital Signs: There were no vitals filed for this visit.  BMI:        General Appearance: No acute distress. Alert and oriented.     Chest:  Non-labored breathing on room air      Left upper Extremity:  Incision clear dry intact and healing appropriately  Patient with approximately 10 degrees extension and 10 degrees flexion at the wrist without crepitus.  Radial styloid K wire is palpable just beneath the skin.  Fingers warm and well-perfused distally  Sensation intact to light touch in the median, radian and ulnar nerve distributions    Imaging/Studies:   Imaging Results (Last 24 Hours)       ** No results found for the last 24 hours. **            Left Wrist X-Ray     Indication: s/p ORIF     Views:  AP, Lateral, and Oblique      Comparison: x-ray from 10/25/23     Findings:  Patient is status post ORIF of the distal radius.  Alignment remains stable.  No new bone is visualized.  The hardware is intact.    Normal soft tissues.  Normal joint spaces        Impression:  X-ray of the left wrist personally reviewed by me.  Fracture of the left distal radius s/p ORIF with stable alignment and fixation.    Procedures:  Procedures    Quality Measures:   ACP:   ACP discussion was declined by the patient. Patient has an advance directive in EMR which is still valid.     Tobacco:   Stef Grey  reports that he has never smoked. He has been exposed to tobacco smoke. He has never used smokeless tobacco..        Assessment / Plan    Assessment/Plan:      Diagnosis Plan   1. S/P ORIF (open reduction internal fixation) fracture  Ambulatory Referral to Occupational Therapy          Patient presents 7 weeks status post open reduction internal fixation of comminuted intra-articular left distal radius fracture.  Patient has been doing well since his last visit.  X-ray with stable alignment of the fracture.  K wire in the radial styloid was removed in the office today.  We will put in a  external referral for hand therapy with [facility] in [place] to begin active and passive range of motion of his left wrist.  Recommend follow-up in 4 weeks for range of motion check and final x-rays.    Procedure Left k-wire removal  The risks and benefits of the procedure were discussed with the patient and a consent was signed.  The radial wrist was prepped in the usual sterile fashion using Hibiclens.  Local block was performed with 1% lidocaine directly over the radial styloid K wire.  15 blade scalpel was used to make a small stab incision.  Blunt dissection was then used to carry the incision down to the level of the K wire.  This was then removed.  The stab incision was then closed with 4-0 Chromic Gut suture in simple interrupted fashion.  Patient tolerated the procedure well.    Follow Up:   Return in about 4 weeks (around 12/20/2023).        Kevin Medina MD  [facility] [place] [facility] [place] [facility] Sur[facility]

## 2023-11-22 NOTE — OUTREACH NOTE
Medical Week 1 Survey      Flowsheet Row Responses   [facility] patient discharged from? [facility]   Does the patient have one of the following disease processes/diagnoses(primary or secondary)? Other   Week 1 attempt successful? Yes   Call start time 0945   Call end time 0947   Discharge diagnosis Schizophrenia  r/o SBO abdominal pin   Meds reviewed with patient/caregiver? Yes   Is the patient having any side effects they believe may be caused by any medication additions or changes? No   Does the patient have all medications ordered at discharge? Yes   Is the patient taking all medications as directed (includes completed medication regime)? Yes   Does the patient have a primary care provider?  Yes   Does the patient have an appointment with their PCP within 7 days of discharge? Yes   Has the patient kept scheduled appointments due by today? N/A   Has home health visited the patient within 72 hours of discharge? N/A   Psychosocial issues? No   Did the patient receive a copy of their discharge instructions? Yes   Nursing interventions Reviewed instructions with patient   What is the patient's perception of their health status since discharge? Improving   Is the patient/caregiver able to teach back the hierarchy of who to call/visit for symptoms/problems? PCP, Specialist, Home health nurse, Urgent Care, ED, 911 Yes   Week 1 call completed? Yes   Graduated Yes   Graduated/Revoked comments Mother reports Pt is doing much better. Reviewed s/s of infection and restrictions after surgery. No needs.   Call end time 0947            CHARMAINE LACY - Registered Nurse

## 2023-11-22 NOTE — PAYOR COMM NOTE
"[facility]# [id number]   11/15/23 Admission  23 Discharged  Discharge Summary    Utilization Review  Phone [phone]  Fax [phone]    [facility]  [address] [place] [address]           Keegan Grey (33 y.o. Male)       Date of Birth   1990    Social Security Number       Address   [facility] [address]    Home Phone   [phone]    MRN   [id number]       Oriental orthodox   None    Marital Status   Single                            Admission Date   11/15/23    Admission Type   Emergency    Admitting Provider   Elle Grey MD    Attending Provider       Department, Room/Bed   [facility] 5G, S551/1       Discharge Date   2023    Discharge Disposition   Home or Self Care    Discharge Destination                                 Attending Provider: (none)   Allergies: Albuterol    Isolation: None   Infection: None   Code Status: Prior    Ht: 167.6 cm (66\")   Wt: 135 kg (298 lb)    Admission Cmt: None   Principal Problem: SBO (small bowel obstruction) [K56.609]                   Active Insurance as of 11/15/2023       Primary Coverage       Payor Plan Insurance Group Employer/Plan Group    [employer]HE[employer] BLUE CROSS ANTHEM BLUE CROSS BLUE SHIELD PPO [id number]       Payor Plan Address Payor Plan Phone Number Payor Plan Fax Number Effective Dates    [address] [phone]  2016 - None Entered    [place] [address]         Subscriber Name Subscriber Birth Date Member ID       KEEGAN GREY 1990 [id number]                     Emergency Contacts        (Rel.) Home Phone Work Phone Mobile Phone    Shantel Grey (Mother) [phone] -- --                 Discharge Summary        Elle Grey MD at 23 1634              [facility] Medicine Services  DISCHARGE SUMMARY    Patient Name: Keegan Grey  : 1990  MRN: [id number]    Date of Admission: 11/15/2023  " 7:25 AM  Date of Discharge:  11/18  Primary Care Physician: Sam Shepard MD    Consults       Date and Time Order Name Status Description    11/15/2023 10:02 AM Inpatient General Surgery Consult Completed             Hospital Course     Presenting Problem: nausea/vomiting    Active Hospital Problems    Diagnosis  POA    Schizophrenia [F20.9]  Yes    Obesity, morbid, BMI 40.0-49.9 [E66.01]  Yes    HTN (hypertension) [I10]  Yes    S/P ORIF (open reduction internal fixation) fracture [Z98.890, Z87.81]  Not Applicable    Radial fracture [S52.90XA]  Yes      Resolved Hospital Problems    Diagnosis Date Resolved POA    **SBO (small bowel obstruction) [K56.609] 11/18/2023 Yes          Hospital Course:  Stef Grey is a 33 y.o. male w BMI >45, schizophrenia, HTN, recent h/o ankle + radius ORIF (early October) who presents with 2 days of acute onset nausea/vomiting/worsening abdominal pain. Concern for SBO w transition point on CT on admission, s/p exploratory laparoscopy 11/15 w Dr Daley withOUT findings of SBO  Patient's abdominal pain much improved after exploration. Advanced diet which was well tolerated, had bowel movements, will discharge home 11/18    Discharge Follow Up Recommendations for outpatient labs/diagnostics:   F/u Dr Daley 2 weeks    Day of Discharge     HPI:   Patient feels much better  2 bowel movements today, tolerated breakfast and lunch, anxious to get home      Vital Signs:   Temp:  [97.1 °F (36.2 °C)-99 °F (37.2 °C)] 98.1 °F (36.7 °C)  Heart Rate:  [77-85] 85  Resp:  [16-18] 18  BP: (131-161)/(77-88) 135/88  Flow (L/min):  [1-1.5] 1.5      Physical Exam:  Constitutional: No acute distress, awake, alert  HENT: NCAT, mucous membranes moist  Respiratory: Clear to auscultation bilaterally, respiratory effort normal   Cardiovascular: RRR, no murmurs, rubs, or gallops  Gastrointestinal: Soft, no distension, mild tenderness as expected at lap scar areas  Musculoskeletal: RLE hard  cast, LUE splint off. Muscle tone within normal limits, no joint effusions appreciated  Psychiatric: Appropriate affect, cooperative  Neurologic: Alert and oriented, facial movements symmetric and spontaneous movement of all 4 extremities grossly equal bilaterally, speech clear  Skin: No rashes    Pertinent  and/or Most Recent Results     LAB RESULTS:      Lab 11/18/23 0417 11/16/23  0404 11/15/23  1111 11/15/23  0754   WBC 11.92* 12.45*  --  10.17   HEMOGLOBIN 13.4 14.7  --  15.1   HEMATOCRIT 42.1 46.5  --  46.7   PLATELETS 156 218  --  201   NEUTROS ABS  --  8.85*  --  7.32*   IMMATURE GRANS (ABS)  --  0.29*  --   --    LYMPHS ABS  --  1.45  --   --    MONOS ABS  --  1.67*  --   --    EOS ABS  --  0.09  --  0.31   MCV 86.1 86.8  --  84.9   PROCALCITONIN  --   --   --  0.58*   LACTATE  --   --  2.0 2.3*         Lab 11/18/23 0417 11/16/23  0404 11/15/23  0754   SODIUM 140 139 136   POTASSIUM 3.6 4.1 4.0   CHLORIDE 101 102 99   CO2 29.0 24.0 23.0   ANION GAP 10.0 13.0 14.0   BUN 10 18 16   CREATININE 0.61* 0.75* 0.79   EGFR 131.7 123.7 121.8   GLUCOSE 86 105* 143*   CALCIUM 9.2 9.7 9.8         Lab 11/16/23 0404 11/15/23  0754 11/13/23  1514   TOTAL PROTEIN 6.9 7.0  --    ALBUMIN 4.2 4.3  --    GLOBULIN 2.7 2.7  --    ALT (SGPT) 23 29  --    AST (SGOT) 19 25  --    BILIRUBIN 0.7 1.3*  --    ALK PHOS 70 70  --    LIPASE  --  11* 22                     Brief Urine Lab Results       None          Microbiology Results (last 10 days)       ** No results found for the last 240 hours. **            XR Abdomen KUB    Result Date: 11/15/2023  XR ABDOMEN KUB Date of Exam: 11/15/2023 11:13 AM EST Indication: s/p NGT placement Comparison: None available. Findings: A nasogastric tube is present with its tip at least within the stomach. There are some distended small bowel loops in what is visualized of the abdomen which could relate to small bowel obstruction.     Impression: 1.Abnormal dilated small bowel loops which could  relate to small bowel obstruction. Electronically Signed: Casa Paz MD  11/15/2023 11:36 AM EST  Workstation ID: [id number]    CT Abdomen Pelvis With Contrast    Result Date: 11/15/2023  CT ABDOMEN PELVIS W CONTRAST Date of Exam: 11/15/2023 8:56 AM EST Indication: Left-sided abdominal tenderness. Comparison: None available. Technique: Axial CT images were obtained of the abdomen and pelvis following the uneventful intravenous administration of 94 mL Isovue-300. Reconstructed coronal and sagittal images were also obtained. Automated exposure control and iterative construction methods were used. Findings: There is moderately severe gastric distention with fluid and air and food particles. The gastric wall is thin. There are cholesterol stones without gallbladder or bile duct dilatation. There is no gallbladder wall thickening or pericholecystic fluid. There are no liver lesions. The spleen, pancreas, and adrenal glands appear normal. There are no renal lesions or hydronephrosis. Partially filled bladder appears grossly normal. There is moderate small bowel dilatation with air-fluid levels and mild small bowel wall thickening. Small bowel measures up to approximately 5 cm transversely. There is a transition to normal caliber small bowel loops in the right lower quadrant.     Impression: Findings are highly suspicious for small bowel obstruction with transition point in the right lower quadrant. Cholelithiasis without acute cholecystitis. Electronically Signed: Estela Davis MD  11/15/2023 9:07 AM EST  Workstation ID: [id number]    CT Abdomen Pelvis With Contrast    Result Date: 11/13/2023  CT SCAN OF THE ABDOMEN AND PELVIS WITH CONTRAST    11/13/2023 3:45 PM HISTORY: Acute epigastric pain with nausea and vomiting. COMPARISON: None. PROCEDURE: The patient was injected with IV contrast. Axial images were obtained from the lung bases to the pubic symphysis by computed tomography. This study was performed with techniques  to keep radiation doses as low as reasonably achievable, (ALARA). Individualized dose reduction techniques using automated exposure control or adjustment of mA and/or kV according to the patient size were employed. FINDINGS: ABDOMEN: The lung bases are clear. The heart is normal in size. The liver is homogenous with no focal abnormality. Stones are identified in the gallbladder. The spleen is unremarkable. No adrenal mass is present.  The pancreas is unremarkable. The kidneys are unremarkable, without evidence of mass or hydronephrosis. The aorta is normal in caliber. There is no free fluid or adenopathy. Stomach is distended. PELVIS: The GI tract demonstrates no obstruction.  The appendix is normal. The urinary bladder is unremarkable. There is no free fluid, adenopathy, or inflammatory process.    Cholelithiasis. Images reviewed, interpreted, and dictated by Dr. ES Ghosh. Transcribed by Deondre Taylor PA-C                 Plan for Follow-up of Pending Labs/Results: n/a    Discharge Details        Discharge Medications        New Medications        Instructions Start Date   HYDROcodone-acetaminophen 5-325 MG per tablet  Commonly known as: NORCO   1 tablet, Oral, Every 6 Hours PRN             Continue These Medications        Instructions Start Date   acetaminophen 500 MG tablet  Commonly known as: TYLENOL   500 mg, Oral, Every 6 Hours PRN      atomoxetine 80 MG capsule  Commonly known as: STRATTERA   80 mg, Oral, Daily      clobetasol 0.05 % ointment  Commonly known as: TEMOVATE   1 application , Topical, 2 Times Daily      olmesartan 20 MG tablet  Commonly known as: BENICAR   20 mg, Oral, Daily      ondansetron ODT 4 MG disintegrating tablet  Commonly known as: ZOFRAN-ODT   4 mg, Translingual, Every 8 Hours PRN      QUEtiapine 200 MG tablet  Commonly known as: SEROquel   200 mg, Oral, Nightly               Allergies   Allergen Reactions    Albuterol Palpitations         Discharge Disposition:  Home or  Self Care    Diet:  Hospital:  Diet Order   Procedures    Diet: Regular/House Diet; Fluid Consistency: Thin (IDDSI 0)       Diet Instructions       Diet: Regular/House Diet; Thin (IDDSI 0)      Discharge Diet: Regular/House Diet    Fluid Consistency: Thin (IDDSI 0)             Activity:      Restrictions or Other Recommendations:       CODE STATUS:    Code Status and Medical Interventions:   Ordered at: 11/15/23 1005     Level Of Support Discussed With:    Patient     Code Status (Patient has no pulse and is not breathing):    CPR (Attempt to Resuscitate)     Medical Interventions (Patient has pulse or is breathing):    Full Support     Comments:    mother as surrogate decision maker       No future appointments.    Additional Instructions for the Follow-ups that You Need to Schedule       Discharge Follow-up with PCP   As directed       Currently Documented PCP:    Sam Shepard MD    PCP Phone Number:    [phone]     Follow Up Details: 1 week        Discharge Follow-up with Specified Provider: Dr Daley; 2 Weeks   As directed      To: Dr Daley   Follow Up: 2 Weeks                      Makayla Grey MD  11/18/23      Time Spent on Discharge:  I spent  20 minutes on this discharge activity which included: face-to-face encounter with the patient, reviewing the data in the system, coordination of the care with the nursing staff as well as consultants, documentation, and entering orders.            Electronically signed by Makayla Grey MD at 11/18/23 1637       Discharge Order (From admission, onward)       Start     Ordered    11/18/23 1629  Discharge patient  Once        Expected Discharge Date: 11/18/23   Discharge Disposition: Home or Self Care   Physician of Record for Attribution - Please select from Treatment Team: MAKAYLA GREY [[id number]]   Review needed by CMO to determine Physician of Record: No      Question Answer Comment   Physician of Record for Attribution - Please select from Treatment  Team MAKAYLA SUÁREZ    Review needed by CMO to determine Physician of Record No        11/18/23 4321

## 2023-11-22 NOTE — PROGRESS NOTES
[facility] Follow Up     Office Follow Up Visit     Date: 11/22/2023   Patient Name: Stef Grey  MRN: [id number]  YOB: 1990  Chief Complaint:   Chief Complaint   Patient presents with    Follow-up     3 week f/u -- 6 weeks S/P ANKLE OPEN REDUCTION INTERNAL FIXATION RIGHT (DOS 10/05/2023)     History of Present Illness:   Stef Grey is a 33 y.o. male who is here today for follow up for follow-up status post right ankle ORIF with syndesmotic injury on October 5.  Approximately 7 weeks out from surgery.  Has been nonweightbearing in a short leg cast.  No new complaints.  Happy with progress.    Subjective   I reviewed the patient's chief complaint, history of present illness, review of systems, past medical history, surgical history, family history, social history, medications and allergy list   Objective    Vital Signs: There were no vitals filed for this visit.  There is no height or weight on file to calculate BMI.    Ortho Exam:  right LE Foot and Ankle Exam:   Cast removed for exam.  Leg compartments soft and compressible.  Incision lateral ankle healing well, no signs of infection.  Able to actively plantarflex and dorsiflex ankle and all toes.  Appropriate swelling for this stage of healing about the foot and ankle.  Toes warm and well-perfused.  Brisk cap refill in all toes.  Ambulated in clinic with minimal discomfort.    Results Review:  XR Ankle 3+ View Right  Right Ankle X-Ray 11/22/23   Indication: Pain  Views: 3 simulated weight bearing , comparison to previous  Findings: xrays reviewed by me today in the office and show, hardware in   place with proper alignment, no signs of hardware failure, ankle mortise   well-maintained        Assessment / Plan    Assessment/Plan:   Diagnoses and all orders for this visit:    1. S/P ORIF (open reduction internal fixation) fracture (Primary)  -     XR Ankle 3+ View Right  -      Ambulatory Referral to Physical Therapy Evaluate and treat, Ortho    2. Surgery follow-up      Returns clinic for follow-up status post right ankle ORIF.  Patient doing well with his recovery.  Incision healing well.  Steri-Strips replaced today.  Patient placed in tall cam walking boot.  Patient can begin weightbearing in tall cam walking boot with use of his assistive ices weaning off of assistive devices as able.  Provided with prescription for physical therapy, functional ankle rehab.  Plan to see patient back in 4 weeks for repeat x-rays and reevaluation.  At that visit we will likely discuss hardware removal.  Was a pleasure seeing him today.    Follow Up:   Return in about 4 weeks (around 12/20/2023) for Recheck, F/U with Images.      Venancio Blum MD  [facility] Orthopedic Surgeon   Answers submitted by the patient for this visit:  Primary Reason for Visit (Submitted on 11/22/2023)  What is the primary reason for your visit?: Other  Other (Submitted on 11/22/2023)  Please describe your symptoms.: Check up for post op  Have you had these symptoms before?: Yes  How long have you been having these symptoms?: Greater than 2 weeks  Please list any medications you are currently taking for this condition.: N/a  Please describe any probable cause for these symptoms. : Post op

## 2023-12-22 ENCOUNTER — OFFICE VISIT (OUTPATIENT)
Dept: ORTHOPEDIC SURGERY | Facility: CLINIC | Age: 33
End: 2023-12-22
Payer: COMMERCIAL

## 2023-12-22 ENCOUNTER — PREP FOR SURGERY (OUTPATIENT)
Dept: OTHER | Facility: HOSPITAL | Age: 33
End: 2023-12-22
Payer: COMMERCIAL

## 2023-12-22 DIAGNOSIS — Z87.81 S/P ORIF (OPEN REDUCTION INTERNAL FIXATION) FRACTURE: ICD-10-CM

## 2023-12-22 DIAGNOSIS — Z98.890 S/P ORIF (OPEN REDUCTION INTERNAL FIXATION) FRACTURE: ICD-10-CM

## 2023-12-22 DIAGNOSIS — T84.84XA PAINFUL ORTHOPAEDIC HARDWARE: Primary | ICD-10-CM

## 2023-12-22 DIAGNOSIS — Z09 SURGERY FOLLOW-UP: Primary | ICD-10-CM

## 2023-12-22 RX ORDER — CEFAZOLIN SODIUM IN 0.9 % NACL 3 G/100 ML
3000 INTRAVENOUS SOLUTION, PIGGYBACK (ML) INTRAVENOUS ONCE
OUTPATIENT
Start: 2023-12-22 | End: 2023-12-22

## 2023-12-22 NOTE — PROGRESS NOTES
[facility] Orthopedic     Follow-up Office Visit       Date: 12/22/2023   Patient Name: Stef Grey  MRN: [id number]  YOB: 1990    Chief Complaint:   Chief Complaint   Patient presents with    Post-op     4 week follow up -- 10 weeks status post right ankle open reduction internal fixation and left wrist  (DOS 10/5/23)        History of Present Illness:   Stef Grey is a 33 y.o. male presents for follow-up status post ORIF of left distal radius fracture on 10/5/2023.  He has been working with hand therapy since his last visit and reports that it is going well.  He has slight limitation with extension of his wrist but otherwise his range of motion is identical to his contralateral wrist.  He denies numbness or tingling.  He has no other concerns.      Subjective   Review of Systems:   Review of Systems   Constitutional: Negative.  Negative for chills, fatigue and fever.   HENT: Negative.  Negative for congestion and dental problem.    Eyes: Negative.  Negative for blurred vision.   Respiratory: Negative.  Negative for shortness of breath.    Cardiovascular: Negative.  Negative for leg swelling.   Gastrointestinal: Negative.  Negative for abdominal pain.   Endocrine: Negative.  Negative for polyuria.   Genitourinary: Negative.  Negative for difficulty urinating.   Musculoskeletal:  Positive for arthralgias.   Skin: Negative.    Allergic/Immunologic: Negative.    Neurological: Negative.    Hematological: Negative.  Negative for adenopathy.   Psychiatric/Behavioral: Negative.  Negative for behavioral problems.         Pertinent review of systems per HPI    I reviewed the patient's chief complaint, history of present illness, review of systems, past medical history, surgical history, family history, social history, medications and allergy list in the EMR on 12/22/2023 and agree with the findings above.    Objective     Vital Signs: There were no vitals filed for this visit.  BMI: Class 3 Severe Obesity (BMI >=40). Obesity-related health conditions include the following: none. Obesity is unchanged. BMI is is above average; no BMI management plan is appropriate. We discussed portion control and increasing exercise.       General Appearance: No acute distress. Alert and oriented.     Chest:  Non-labored breathing on room air      Left upper Extremity:  Incision well-healed   Patient with full flexion of his left wrist compared to the contralateral side.  Full pronation and supination compared to the contralateral wrist.  He has approximately 10 degrees less extension on the left wrist.  fingers warm and well-perfused distally  Sensation intact to light touch in the median, radian and ulnar nerve distributions    Imaging/Studies:   Imaging Results (Last 24 Hours)       Procedure Component Value Units Date/Time    XR Wrist 3+ View Left [[id number]] Resulted: 12/22/23 1118     Updated: 12/22/23 1118    Narrative:      Left Wrist X-Ray    Indication: s/p ORIF    Views:  AP, Lateral, and Oblique     Comparison: Straight from 11/22/2023    Findings:  Patient is status post ORIF of the distal radius.  Alignment remains stable.  The fracture appears to be healing appropriately.  New bone is visualized.    The hardware is intact.    Normal soft tissues.  Normal joint spaces      Impression:  X-ray of the left wrist personally reviewed by me.  Fracture   of the left distal radius s/p ORIF with near complete healing.    XR Ankle 3+ View Right [[id number]] Resulted: 12/22/23 1058     Updated: 12/22/23 1058    Narrative:      Right Ankle X-Ray 12/22/23   Indication: Pain  Views: 3 simulated weight bearing , comparison to previous  Findings: xrays reviewed by me today in the office and show, hardware in   place with proper alignment, no signs of hardware failure, ankle mortise   well-maintained               Procedures:  Procedures    Quality  Measures:   ACP:   ACP discussion was declined by the patient. Patient has an advance directive in EMR which is still valid.     Tobacco:   Stef Grey  reports that he has never smoked. He has been exposed to tobacco smoke. He has never used smokeless tobacco..     Assessment / Plan    Assessment/Plan:      Diagnosis Plan   1. Surgery follow-up  XR Wrist 3+ View Left      2. S/P ORIF (open reduction internal fixation) fracture  XR Ankle 3+ View Right          There is now approximately 12 weeks status post left distal radius open reduction internal fixation.  He has been working with therapy and we presents with excellent range of motion of his left wrist.  He has evidence of healing on x-ray with a preserved radiocarpal joint.  He may return to normal activities.  Recommend 20 pound lifting restriction for an additional month and then no further restrictions for his left upper extremity.  He is still interested in having his plate removed in 3 to 4 months when appropriate.  Mended follow-up in 4 months for repeat check and possible discussion for hardware removal at that time.    Follow Up:   Return in about 4 months (around 4/22/2024).        Kevin Medina MD  [facility] [place] [facility]

## 2023-12-22 NOTE — PROGRESS NOTES
[facility] [place] [facility] [place] Follow Up     Office Follow Up Visit     Date: 12/22/2023   Patient Name: Stef Grey  MRN: [id number]  YOB: 1990  Chief Complaint:   Chief Complaint   Patient presents with    Post-op     4 week follow up -- 10 weeks status post right ankle open reduction internal fixation and left wrist  (DOS 10/5/23)      History of Present Illness:   Stef Grey is a 33 y.o. male who is here today for follow up for right ankle fracture.  Status post ORIF.  Patient is approaching 3 months following his surgery.  Has been weightbearing as tolerated in a cam walking boot.  Denies any pain.  Has been progressing with surgery.  States still having weakness in his right lower extremity but no pain and is happy with his progress.    Subjective   I reviewed the patient's chief complaint, history of present illness, review of systems, past medical history, surgical history, family history, social history, medications and allergy list   Objective    Vital Signs: There were no vitals filed for this visit.  There is no height or weight on file to calculate BMI.    Ortho Exam:  right LE Foot and Ankle Exam:   boot removed for exam.  Leg compartments soft and compressible.  Incision lateral ankle well-healed.  Able to actively plantarflex and dorsiflex ankle and all toes.  Appropriate swelling for this stage of healing about the foot and ankle.  Toes warm and well-perfused.  Brisk cap refill in all toes.  Ambulated in clinic with no discomfort.  No tenderness to palpation near incision or about ankle or over the syndesmosis.    Results Review:  XR Wrist 3+ View Left  Left Wrist X-Ray    Indication: s/p ORIF    Views:  AP, Lateral, and Oblique     Comparison: Straight from 11/22/2023    Findings:  Patient is status post ORIF of the distal radius.  Alignment remains stable.  The fracture appears to be healing appropriately.  New bone is visualized.     The hardware is intact.    Normal soft tissues.  Normal joint spaces    Impression:  X-ray of the left wrist personally reviewed by me.  Fracture   of the left distal radius s/p ORIF with near complete healing.  XR Ankle 3+ View Right  Right Ankle X-Ray 12/22/23   Indication: Pain  Views: 3 simulated weight bearing , comparison to previous  Findings: xrays reviewed by me today in the office and show, hardware in   place with proper alignment, no signs of hardware failure, ankle mortise   well-maintained        Assessment / Plan    Assessment/Plan:   Diagnoses and all orders for this visit:    1. Surgery follow-up (Primary)  -     XR Wrist 3+ View Left    2. S/P ORIF (open reduction internal fixation) fracture  -     XR Ankle 3+ View Right      Returns to clinic status post right ankle ORIF.  Patient continues to do well with his recovery.  Patient continues to progress with therapy improving on strength and range of motion.  Still with some limited dorsiflexion likely secondary to the injury as well as surgery and possibly still due to the hardware.  Once again discussed hardware removal in clinic today.  Plan will be for hardware to be removed on January 4.  That surgery was scheduled in clinic today.  We also discussed that right now the hardware appears intact however there is a chance that the hardware breaks between now and the schedule hardware removal.  If that were to happen I told the patient would have a further discussion about leaving the hardware versus taking out despite it breaking.  Patient expressed understanding.    Right ankle hardware removal.    The risks and benefits of the procedure were discussed with the patient and or appropriate guardian, which include but are not limited to the risk of bleeding, infection, neurovascular damage, post-operative stiffness, tendon and/or ligament retears, recurrent instability, continued pain, arthritic pain, need for further revision surgeries in the future,  deep venous thrombosis, and general risks from anesthesia. We also discussed the post-operative rehabilitation, the continued need for physical therapy, and the overall expected outcomes from the procedure. We also discussed the possible use of biologics including allograft. We allowed proper time and answered the patient's questions regarding the procedure. The patient expressed understanding. Knowing what the risks are and what the conservative treatment is, the patient elected to forgo any further conservative treatment options and proceed with the surgical intervention.       Follow Up:   Follow up 2 weeks after surgery      Venancio Blum MD  [facility] [place] Orthopedic Surgeon   Answers submitted by the patient for this visit:  Primary Reason for Visit (Submitted on 12/22/2023)  What is the primary reason for your visit?: Other  Other (Submitted on 12/22/2023)  Please describe your symptoms.: Post op check up I'll be right back  Have you had these symptoms before?: Yes  How long have you been having these symptoms?: Greater than 2 weeks

## 2023-12-29 ENCOUNTER — TELEPHONE (OUTPATIENT)
Dept: ORTHOPEDIC SURGERY | Facility: CLINIC | Age: 33
End: 2023-12-29
Payer: COMMERCIAL

## 2023-12-29 NOTE — TELEPHONE ENCOUNTER
Returned patients call and let him know that we normally fax, confirm and scan the forms into the chart after they are completed and signed by the provider.     - Pam HARRIS)

## 2023-12-29 NOTE — TELEPHONE ENCOUNTER
Provider: MI    Caller: KEEGAN    Relationship to Patient: SELF    Pharmacy:     Phone Number: [phone]    Reason for Call: PT CALLING TO SEE IF THE PAPERWORK FOR WORK HAS BEEN FILLED OUT YET. PT ASK THAT IS BE FAXED AND UPLOADED TO HIS [employer]

## 2024-01-04 ENCOUNTER — DOCUMENTATION (OUTPATIENT)
Dept: ORTHOPEDIC SURGERY | Facility: CLINIC | Age: 34
End: 2024-01-04

## 2024-01-04 ENCOUNTER — OUTSIDE FACILITY SERVICE (OUTPATIENT)
Dept: ORTHOPEDIC SURGERY | Facility: CLINIC | Age: 34
End: 2024-01-04
Payer: COMMERCIAL

## 2024-01-04 DIAGNOSIS — T84.84XA PAINFUL ORTHOPAEDIC HARDWARE: Primary | ICD-10-CM

## 2024-01-04 RX ORDER — DOCUSATE SODIUM 100 MG/1
100 CAPSULE, LIQUID FILLED ORAL 2 TIMES DAILY
Qty: 30 CAPSULE | Refills: 0 | Status: SHIPPED | OUTPATIENT
Start: 2024-01-04 | End: 2024-01-19

## 2024-01-04 RX ORDER — ACETAMINOPHEN 500 MG
500 TABLET ORAL EVERY 6 HOURS PRN
Qty: 20 TABLET | Refills: 0 | Status: SHIPPED | OUTPATIENT
Start: 2024-01-04

## 2024-01-04 RX ORDER — ONDANSETRON 4 MG/1
4 TABLET, FILM COATED ORAL EVERY 8 HOURS PRN
Qty: 15 TABLET | Refills: 0 | Status: SHIPPED | OUTPATIENT
Start: 2024-01-04

## 2024-01-04 RX ORDER — ASPIRIN 81 MG/1
81 TABLET ORAL DAILY
Qty: 14 TABLET | Refills: 0 | Status: SHIPPED | OUTPATIENT
Start: 2024-01-04 | End: 2024-01-18

## 2024-01-04 RX ORDER — OXYCODONE HYDROCHLORIDE 5 MG/1
5 TABLET ORAL EVERY 4 HOURS PRN
Qty: 20 TABLET | Refills: 0 | Status: SHIPPED | OUTPATIENT
Start: 2024-01-04

## 2024-01-04 NOTE — PROGRESS NOTES
ORTHOPAEDIC SURGERY OPERATIVE REPORT    Location of Surgery: [facility]    Patient Name:  Stef Grey  YOB: 1990    Date of Surgery:    1/4/2024     Pre-op Diagnosis:   Painful orthopedic hardware     Post-op Diagnosis:      Same     Procedure/CPT® Codes:  Deep hardware removal, 50571     Staff:  Venancio Blum MD     Anesthesia: General with local     Estimated Blood Loss: 5 cc     Specimen:          None     Complications:   None    CLINICAL HISTORY:  Stef Grey is a 33 y.o. male with the above condition. Discussed operative and non-operative treatment options and risks including but not limited to pain, infection, bleeding, damage to surrounding structures, and need for additional procedures.  After all questions were fully answered, Stef Grey understood the risks and elected to proceed, and informed consent was obtained. The patient was marked with indelible marking pen after verifying correct side, site, and procedure.      DESCRIPTION OF PROCEDURE:   The patient was identified in the pre-operative area where correct procedure, site, and patient were verified. The patient was brought to the operating theater in stable condition and was transferred to the operative table where they remained in the supine position for the entirety of the case. Preoperative timeout was taken to ensure the correct identity, operative procedure, and location. General anesthesia was then administered. The patient received appropriate weight based IV antibiotics within 30 minutes of skin incision.  All bony prominences well-padded. The right leg was then prepped and draped in the standard sterile fashion. After Esmarch exsanguination, the tourniquet was inflated.     A 15 blade was used to incise skin over the previous incision at the lateral ankle.  Blunt dissection was taken down through the soft tissues and the plate and screws were identified.  All 3 screws were backed out without  difficulty.  Next a [place] elevator was used to loosen of the plate which was well also removed.  Xrays were used to then confirm removal of all hardware.  A right ankle stress view was also then performed confirming a congruent ankle mortise and with no increased tib-fib clear space or medial joint space widening.     The wound was then irrigated copiously.  The wound was then closed with 2-0 Monocryl in the deep tissues followed by 3-0 monocryl in the subcutaneous tissues and 3-0 nylon sutures in the skin.  Local anesthetic was then administered both around the lateral incision as well as some medially around the area of the periosteum where the tips of the syndesmotic screws were on the medial tibia.  A sterile dressing was applied and the patient was placed in a tall cam walking boot.     The patient tolerated the procedure well no with acute complications identified.  All sponge & needle counts were correct x2 prior to procedure conclusion.  Patient was awoken from anesthesia and transferred back to the PACU without complication.     Counts:    Sponge: Correct    Needle: Correct    Sharp: Correct    Instrument: Correct     POSTOPERATIVE PLAN:   DC home, WBAT in boot, follow-up in office in 2 weeks for suture removal and wound check     Implants:    * No surgical log found *     Karuna Villalobos was responsible for performing the following activities: Retraction, Suction, Irrigation, and Placing Dressing and their skilled assistance was necessary for the success of this case.     Venancio Blum MD     Date: 1/4/2024  Time: 11:23 EST  Electronically signed by Venancio Blum MD, 01/04/24, 11:23 AM EST.

## 2024-01-05 ENCOUNTER — TELEPHONE (OUTPATIENT)
Dept: ORTHOPEDIC SURGERY | Facility: CLINIC | Age: 34
End: 2024-01-05
Payer: COMMERCIAL

## 2024-01-05 NOTE — TELEPHONE ENCOUNTER
Caller: Stef Grey    Relationship to patient: Self    Best call back number: [phone]    Patient is needing: PATIENT DROPPED OFF 3/4 PAGES FROM [employer] [facility] TEAM ON HIS VISIT WITH DR STARK ON 12/22/23.   HE CHECKED BACK IN ON 12/29/23 TO GET THE PAPERWORK, BUT WAS TOLD THAT THEY WERE STILL WAITING ON A SIGNATURE FROM THE .   HE HAS NOW HAD SX WITH [facility] ON 1/4/24 AND STILL DOES NOT HAVE SIGNED PAPERWORK FOR BEING AWAY FROM.     PLEASE ADVISE THE PATIENT ASAP ON WHEN HE CAN EXPECT TO GET THIS PAPERWORK ASAP. HIS DEADLINE TO GET THE PAPERWORK TURNED IN IS MONDAY, 1/8/24.     LEAVE VM IF NO ANSWER.  HE IS NEEDING IT FAXED BACK AND UPLOADED TO [employer] SO THAT HE HAS A COPY.

## 2024-01-05 NOTE — TELEPHONE ENCOUNTER
Called patient to let him know that we did fax his [facility] paperwork and will be uploaded into [employer]. Also if he had any question or concerns to give us a call.     - Pam AMARO(R)

## 2024-01-10 ENCOUNTER — DOCUMENTATION (OUTPATIENT)
Dept: ORTHOPEDIC SURGERY | Facility: CLINIC | Age: 34
End: 2024-01-10
Payer: COMMERCIAL

## 2024-01-10 NOTE — PROGRESS NOTES
Right Ankle X-Ray 1/4/24 done at the [facility]  Indication: Pain  Views: 2 non - weight bearing, comparison to previous  Findings: xrays reviewed by me show 2 x-rays 1 with ankle hardware in place and the 2nd following hardware removal

## 2024-01-19 ENCOUNTER — OFFICE VISIT (OUTPATIENT)
Dept: ORTHOPEDIC SURGERY | Facility: CLINIC | Age: 34
End: 2024-01-19
Payer: COMMERCIAL

## 2024-01-19 VITALS — TEMPERATURE: 97.7 F

## 2024-01-19 DIAGNOSIS — Z09 SURGERY FOLLOW-UP: Primary | ICD-10-CM

## 2024-01-19 PROCEDURE — 99024 POSTOP FOLLOW-UP VISIT: CPT | Performed by: ORTHOPAEDIC SURGERY

## 2024-01-19 NOTE — PROGRESS NOTES
[facility] [place] [facility]opaedic Surgery Office Follow Up     Office Follow Up Visit     Date: 01/19/2024   Patient Name: Stef Grey  MRN: [id number]  YOB: 1990  Chief Complaint:   Chief Complaint   Patient presents with    Post-op Follow-up     2 weeks S/P Deep hardware removal (DOS: 1/4/24)     History of Present Illness:   Stef Grey is a 33 y.o. male who is here today for follow up for follow-up following his right ankle hardware removal.  Is weightbearing as tolerated in a cam walking boot with no pain.  Patient is currently in physical therapy and reports seeing improvement with regard to range of motion as well as strength in the right lower extremity.    Subjective   I reviewed the patient's chief complaint, history of present illness, review of systems, past medical history, surgical history, family history, social history, medications and allergy list   Objective    Vital Signs:   Vitals:    01/19/24 1050   Temp: 97.7 °F (36.5 °C)     There is no height or weight on file to calculate BMI.    Ortho Exam:  right LE Foot and Ankle Exam:   boot removed for exam.  Leg compartments soft and compressible.  Incision lateral ankle clean dry and intact with sutures in place, no erythema or drainage.  Able to actively plantarflex and dorsiflex ankle and all toes.  Appropriate swelling for this stage of healing about the foot and ankle.  Toes warm and well-perfused.  Brisk cap refill in all toes.  Ambulated in clinic with no discomfort.      Results Review:  No new imaging    Assessment / Plan    Assessment/Plan:   Diagnoses and all orders for this visit:    1. Surgery follow-up (Primary)      Patient is now 2 weeks postop., doing well. Happy with result, no complaints. Sutures removed in clinic today.  Steri-Strips placed.  Continue to ice and elevate during recovery.  Patient can now wean out of the walking boot and off of cane as he continues to progress with therapy.   Patient provided with ankle brace in clinic today.  Reports that he is going back to work on Wednesday of next week.  Call placed to plan on wearing brace and supportive shoe when he returns to work and to slowly advance activities at work as his symptoms allow.  Plan to see patient back in clinic in 5 weeks for repeat x-rays and reevaluation.  Patient counseled to contact the clinic if anything should arise in the meantime.     Follow Up:   Return in about 5 weeks (around 2/23/2024) for Recheck, F/U with Images.      Venancio Blum MD  [facility] [place] Orthopedic Surgeon   Answers submitted by the patient for this visit:  Primary Reason for Visit (Submitted on 1/12/2024)  What is the primary reason for your visit?: Other  Other (Submitted on 1/12/2024)  Please describe your symptoms.: Post op check up for removal of hardware from right leg  Have you had these symptoms before?: Yes  How long have you been having these symptoms?: 1-2 weeks

## 2024-02-06 ENCOUNTER — TELEPHONE (OUTPATIENT)
Dept: ORTHOPEDIC SURGERY | Facility: CLINIC | Age: 34
End: 2024-02-06
Payer: COMMERCIAL

## 2024-02-19 ENCOUNTER — OFFICE VISIT (OUTPATIENT)
Dept: ORTHOPEDIC SURGERY | Facility: CLINIC | Age: 34
End: 2024-02-19
Payer: COMMERCIAL

## 2024-02-19 VITALS
SYSTOLIC BLOOD PRESSURE: 150 MMHG | WEIGHT: 304 LBS | BODY MASS INDEX: 48.86 KG/M2 | HEIGHT: 66 IN | DIASTOLIC BLOOD PRESSURE: 92 MMHG

## 2024-02-19 DIAGNOSIS — M23.8X1 JOINT LAXITY OF RIGHT KNEE: ICD-10-CM
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
## 2024-02-19 DIAGNOSIS — M25.561 RIGHT KNEE PAIN, UNSPECIFIED CHRONICITY: Primary | ICD-10-CM

## 2024-02-19 DIAGNOSIS — E66.01 OBESITY, MORBID, BMI 40.0-49.9: ICD-10-CM

## 2024-02-19 PROCEDURE — 99213 OFFICE O/P EST LOW 20 MIN: CPT | Performed by: ORTHOPAEDIC SURGERY
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
## 2024-02-26 ENCOUNTER — OFFICE VISIT (OUTPATIENT)
Dept: ORTHOPEDIC SURGERY | Facility: CLINIC | Age: 34
End: 2024-02-26
Payer: COMMERCIAL

## 2024-02-26 DIAGNOSIS — Z09 SURGERY FOLLOW-UP: Primary | ICD-10-CM

## 2024-02-26 PROCEDURE — 99024 POSTOP FOLLOW-UP VISIT: CPT | Performed by: ORTHOPAEDIC SURGERY

## 2024-02-26 NOTE — PROGRESS NOTES
[facility] [place] [facility] [place]     Office Follow Up Visit     Date: 02/26/2024   Patient Name: Stef Grey  MRN: [id number]  YOB: 1990  Chief Complaint:   Chief Complaint   Patient presents with    Post-op     5 week f/u-- 7 weeks S/P Deep hardware removal right ankle (DOS: 1/4/24)     History of Present Illness:   Stef Grey is a 34 y.o. male who is here today for follow up following right ankle hardware removal on January 4.  Continues to work at [employer] with previous restrictions.  Currently seeing Dr. Hameed for treatment of right knee pain, awaiting MRI.  States occasional pain and stiffness about the ankle however feels like it is doing great and has no new complaints.  Is happy with his progress.  He is doing physical therapy and continues to make improvements with regard to ankle range of motion and right lower extremity strengthening.    Subjective   I reviewed the patient's chief complaint, history of present illness, review of systems, past medical history, surgical history, family history, social history, medications and allergy list   Objective    Vital Signs: There were no vitals filed for this visit.  There is no height or weight on file to calculate BMI.    Ortho Exam:  right LE Foot and Ankle Exam:   Leg compartments soft and compressible.  Incision lateral ankle well-healed..  Able to actively plantarflex and dorsiflex ankle and all toes.  Appropriate swelling for this stage of healing about the foot and ankle.  Toes warm and well-perfused.  Brisk cap refill in all toes.  Ambulated in clinic with no discomfort.      Results Review:  XR Ankle 3+ View Right  Right Ankle X-Ray 02/26/24   Indication: Pain  Views: 3 weight bearing , comparison to previous  Findings: xrays reviewed by me today in the office and show signs of   previous hardware that was removed distal fibula and tibia, ankle mortise   well-maintained, normal joint  space          Assessment / Plan    Assessment/Plan:   Diagnoses and all orders for this visit:    1. Surgery follow-up (Primary)  -     XR Ankle 3+ View Right      Patient is now about 7 weeks out from his hardware removal.  Has resumed return to work with some restrictions, primarily lifting following his wrist injury.  Patient can continue to perform activity as tolerated on his right lower extremity with regard to his ankle.  I will defer management of his knee to Dr. Hameed.  Plan to see patient back in 3 months for repeat x-rays and reevaluation.  Counseled patient to contact clinic sooner if any concerns should arise.  Happy with his progress.  Was a pleasure seeing him today.    Follow Up:   Return in about 3 months (around 5/26/2024) for Recheck, F/U with Images.      Venancio Blum MD  [facility] [place] [facility]   Answers submitted by the patient for this visit:  Primary Reason for Visit (Submitted on 2/24/2024)  What is the primary reason for your visit?: Other  Other (Submitted on 2/24/2024)  Please describe your symptoms.: Follow up appointment for recovering broken leg  Have you had these symptoms before?: Yes  How long have you been having these symptoms?: Greater than 2 weeks

## 2024-03-03 ENCOUNTER — HOSPITAL ENCOUNTER (OUTPATIENT)
Dept: MRI IMAGING | Facility: HOSPITAL | Age: 34
Discharge: HOME | End: 2024-03-03
Admitting: ORTHOPAEDIC SURGERY
Payer: COMMERCIAL

## 2024-03-03 DIAGNOSIS — M23.8X1 JOINT LAXITY OF RIGHT KNEE: ICD-10-CM

## 2024-03-03 DIAGNOSIS — M25.561 RIGHT KNEE PAIN, UNSPECIFIED CHRONICITY: ICD-10-CM

## 2024-03-03 PROCEDURE — 73721 MRI JNT OF LWR EXTRE W/O DYE: CPT

## 2024-03-18 ENCOUNTER — OFFICE VISIT (OUTPATIENT)
Dept: ORTHOPEDIC SURGERY | Facility: CLINIC | Age: 34
End: 2024-03-18
Payer: COMMERCIAL

## 2024-03-18 VITALS — SYSTOLIC BLOOD PRESSURE: 132 MMHG | DIASTOLIC BLOOD PRESSURE: 86 MMHG

## 2024-03-18 DIAGNOSIS — M25.561 RIGHT KNEE PAIN, UNSPECIFIED CHRONICITY: Primary | ICD-10-CM

## 2024-03-18 DIAGNOSIS — E66.01 OBESITY, MORBID, BMI 40.0-49.9: ICD-10-CM
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
## 2024-03-18 PROCEDURE — 99213 OFFICE O/P EST LOW 20 MIN: CPT | Performed by: ORTHOPAEDIC SURGERY
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
## 2024-04-09 ENCOUNTER — TELEPHONE (OUTPATIENT)
Dept: ORTHOPEDIC SURGERY | Facility: CLINIC | Age: 34
End: 2024-04-09
Payer: COMMERCIAL

## 2024-04-09 NOTE — TELEPHONE ENCOUNTER
CALLED PATIENT TO RESCHEDULE APPT WITH DR JENSEN FOR 4/15/24 AT 2PM FOR FOLLOW UP, JUANITA MUKHERJEE TO SCHEDULE

## 2024-04-15 ENCOUNTER — OFFICE VISIT (OUTPATIENT)
Dept: ORTHOPEDIC SURGERY | Facility: CLINIC | Age: 34
End: 2024-04-15
Payer: COMMERCIAL

## 2024-04-15 VITALS
DIASTOLIC BLOOD PRESSURE: 90 MMHG | SYSTOLIC BLOOD PRESSURE: 152 MMHG | WEIGHT: 311.2 LBS | HEIGHT: 66 IN | BODY MASS INDEX: 50.01 KG/M2

## 2024-04-15 DIAGNOSIS — M25.561 RIGHT KNEE PAIN, UNSPECIFIED CHRONICITY: Primary | ICD-10-CM
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
## 2024-04-15 DIAGNOSIS — E66.01 OBESITY, MORBID, BMI 40.0-49.9: ICD-10-CM

## 2024-04-15 PROCEDURE — 99213 OFFICE O/P EST LOW 20 MIN: CPT | Performed by: ORTHOPAEDIC SURGERY

## 2024-04-15 RX ORDER — MELOXICAM 15 MG/1
TABLET ORAL
Qty: 90 TABLET | Refills: 2 | Status: SHIPPED | OUTPATIENT
Start: 2024-04-15
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
## 2024-04-30 ENCOUNTER — TELEPHONE (OUTPATIENT)
Dept: ORTHOPEDIC SURGERY | Facility: CLINIC | Age: 34
End: 2024-04-30
Payer: COMMERCIAL

## 2024-04-30 NOTE — TELEPHONE ENCOUNTER
Caller: Stef Grey    Relationship: Self    Best call back number: [phone]    What is the best time to reach you: ANY    Who are you requesting to speak with (clinical staff, provider,  specific staff member): SPECIFIC MEMBER    Do you know the name of the person who called: ANGELICA    What was the call regarding: UNKNOWN    Is it okay if the provider responds through MyChart: NO

## 2024-04-30 NOTE — TELEPHONE ENCOUNTER
**HUB OKAY TO RELAY**    Called patient to confirm which  (Viktoria or Yoseph) [place] paperwork needs to be filled out for. No answer; left vm

## 2024-04-30 NOTE — TELEPHONE ENCOUNTER
Caller: KEEGAN SUÁREZ     Phone Number: [phone] (home)     Reason for Call:   PATIENT CALLED BACK STATED PAPERWORK IS FOR DR. JENSEN

## 2024-05-21 ENCOUNTER — APPOINTMENT (OUTPATIENT)
Dept: CT IMAGING | Facility: HOSPITAL | Age: 34
End: 2024-05-21
Payer: COMMERCIAL

## 2024-05-21 ENCOUNTER — HOSPITAL ENCOUNTER (EMERGENCY)
Facility: HOSPITAL | Age: 34
Discharge: HOME | End: 2024-05-21
Attending: EMERGENCY MEDICINE | Admitting: EMERGENCY MEDICINE
Payer: COMMERCIAL

## 2024-05-21 VITALS
OXYGEN SATURATION: 95 % | RESPIRATION RATE: 22 BRPM | HEIGHT: 65 IN | BODY MASS INDEX: 51.65 KG/M2 | TEMPERATURE: 97.9 F | SYSTOLIC BLOOD PRESSURE: 153 MMHG | DIASTOLIC BLOOD PRESSURE: 98 MMHG | WEIGHT: 310 LBS | HEART RATE: 105 BPM

## 2024-05-21 DIAGNOSIS — Z87.19 HISTORY OF SMALL BOWEL OBSTRUCTION: ICD-10-CM

## 2024-05-21 DIAGNOSIS — R11.2 NAUSEA AND VOMITING, UNSPECIFIED VOMITING TYPE: ICD-10-CM

## 2024-05-21 DIAGNOSIS — K52.9 GASTROENTERITIS: Primary | ICD-10-CM

## 2024-05-21 DIAGNOSIS — R19.7 DIARRHEA, UNSPECIFIED TYPE: ICD-10-CM

## 2024-05-21 LAB
ALBUMIN SERPL-MCNC: 3.8 G/DL (ref 3.5–5.2)
ALBUMIN/GLOB SERPL: 1.2 G/DL
ALP SERPL-CCNC: 89 U/L (ref 39–117)
ALT SERPL W P-5'-P-CCNC: 29 U/L (ref 1–41)
ANION GAP SERPL CALCULATED.3IONS-SCNC: 14 MMOL/L (ref 5–15)
AST SERPL-CCNC: 24 U/L (ref 1–40)
BASOPHILS # BLD AUTO: 0.07 10*3/MM3 (ref 0–0.2)
BASOPHILS NFR BLD AUTO: 0.5 % (ref 0–1.5)
BILIRUB SERPL-MCNC: 0.4 MG/DL (ref 0–1.2)
BUN SERPL-MCNC: 17 MG/DL (ref 6–20)
BUN/CREAT SERPL: 22.7 (ref 7–25)
CALCIUM SPEC-SCNC: 8.7 MG/DL (ref 8.6–10.5)
CHLORIDE SERPL-SCNC: 105 MMOL/L (ref 98–107)
CO2 SERPL-SCNC: 21 MMOL/L (ref 22–29)
CREAT SERPL-MCNC: 0.75 MG/DL (ref 0.76–1.27)
D-LACTATE SERPL-SCNC: 2.1 MMOL/L (ref 0.5–2)
DEPRECATED RDW RBC AUTO: 41.4 FL (ref 37–54)
EGFRCR SERPLBLD CKD-EPI 2021: 123 ML/MIN/1.73
EOSINOPHIL # BLD AUTO: 0.33 10*3/MM3 (ref 0–0.4)
EOSINOPHIL NFR BLD AUTO: 2.2 % (ref 0.3–6.2)
ERYTHROCYTE [DISTWIDTH] IN BLOOD BY AUTOMATED COUNT: 13.6 % (ref 12.3–15.4)
GLOBULIN UR ELPH-MCNC: 3.2 GM/DL
GLUCOSE SERPL-MCNC: 177 MG/DL (ref 65–99)
HCT VFR BLD AUTO: 49.4 % (ref 37.5–51)
HGB BLD-MCNC: 15.7 G/DL (ref 13–17.7)
IMM GRANULOCYTES # BLD AUTO: 0.26 10*3/MM3 (ref 0–0.05)
IMM GRANULOCYTES NFR BLD AUTO: 1.7 % (ref 0–0.5)
LIPASE SERPL-CCNC: 22 U/L (ref 13–60)
LYMPHOCYTES # BLD AUTO: 1.12 10*3/MM3 (ref 0.7–3.1)
LYMPHOCYTES NFR BLD AUTO: 7.4 % (ref 19.6–45.3)
MCH RBC QN AUTO: 26.6 PG (ref 26.6–33)
MCHC RBC AUTO-ENTMCNC: 31.8 G/DL (ref 31.5–35.7)
MCV RBC AUTO: 83.6 FL (ref 79–97)
MONOCYTES # BLD AUTO: 1.39 10*3/MM3 (ref 0.1–0.9)
MONOCYTES NFR BLD AUTO: 9.2 % (ref 5–12)
NEUTROPHILS NFR BLD AUTO: 11.91 10*3/MM3 (ref 1.7–7)
NEUTROPHILS NFR BLD AUTO: 79 % (ref 42.7–76)
NRBC BLD AUTO-RTO: 0 /100 WBC (ref 0–0.2)
PLATELET # BLD AUTO: 180 10*3/MM3 (ref 140–450)
PMV BLD AUTO: 12.1 FL (ref 6–12)
POTASSIUM SERPL-SCNC: 4 MMOL/L (ref 3.5–5.2)
PROT SERPL-MCNC: 7 G/DL (ref 6–8.5)
RBC # BLD AUTO: 5.91 10*6/MM3 (ref 4.14–5.8)
SODIUM SERPL-SCNC: 140 MMOL/L (ref 136–145)
WBC NRBC COR # BLD AUTO: 15.08 10*3/MM3 (ref 3.4–10.8)

## 2024-05-21 PROCEDURE — 74177 CT ABD & PELVIS W/CONTRAST: CPT

## 2024-05-21 PROCEDURE — 99285 EMERGENCY DEPT VISIT HI MDM: CPT

## 2024-05-21 PROCEDURE — 83605 ASSAY OF LACTIC ACID: CPT | Performed by: PHYSICIAN ASSISTANT

## 2024-05-21 PROCEDURE — 25810000003 SODIUM CHLORIDE 0.9 % SOLUTION: Performed by: PHYSICIAN ASSISTANT

## 2024-05-21 PROCEDURE — 85025 COMPLETE CBC W/AUTO DIFF WBC: CPT | Performed by: PHYSICIAN ASSISTANT

## 2024-05-21 PROCEDURE — 80053 COMPREHEN METABOLIC PANEL: CPT | Performed by: PHYSICIAN ASSISTANT

## 2024-05-21 PROCEDURE — 25510000001 IOPAMIDOL 61 % SOLUTION: Performed by: EMERGENCY MEDICINE

## 2024-05-21 PROCEDURE — 96374 THER/PROPH/DIAG INJ IV PUSH: CPT

## 2024-05-21 PROCEDURE — 83690 ASSAY OF LIPASE: CPT | Performed by: PHYSICIAN ASSISTANT

## 2024-05-21 PROCEDURE — 25010000002 ONDANSETRON PER 1 MG: Performed by: PHYSICIAN ASSISTANT

## 2024-05-21 PROCEDURE — 96375 TX/PRO/DX INJ NEW DRUG ADDON: CPT

## 2024-05-21 RX ORDER — SODIUM CHLORIDE 0.9 % (FLUSH) 0.9 %
10 SYRINGE (ML) INJECTION AS NEEDED
Status: DISCONTINUED | OUTPATIENT
Start: 2024-05-21 | End: 2024-05-21 | Stop reason: HOSPADM

## 2024-05-21 RX ORDER — ONDANSETRON 2 MG/ML
4 INJECTION INTRAMUSCULAR; INTRAVENOUS ONCE
Status: COMPLETED | OUTPATIENT
Start: 2024-05-21 | End: 2024-05-21

## 2024-05-21 RX ORDER — FAMOTIDINE 10 MG/ML
20 INJECTION, SOLUTION INTRAVENOUS ONCE
Status: COMPLETED | OUTPATIENT
Start: 2024-05-21 | End: 2024-05-21

## 2024-05-21 RX ORDER — ONDANSETRON 4 MG/1
4 TABLET, ORALLY DISINTEGRATING ORAL EVERY 8 HOURS PRN
Qty: 9 TABLET | Refills: 0 | Status: SHIPPED | OUTPATIENT
Start: 2024-05-21 | End: 2024-05-24

## 2024-05-21 RX ADMIN — SODIUM CHLORIDE 1000 ML: 9 INJECTION, SOLUTION INTRAVENOUS at 13:41

## 2024-05-21 RX ADMIN — FAMOTIDINE 20 MG: 10 INJECTION, SOLUTION INTRAVENOUS at 13:42

## 2024-05-21 RX ADMIN — SODIUM CHLORIDE 1000 ML: 9 INJECTION, SOLUTION INTRAVENOUS at 15:51

## 2024-05-21 RX ADMIN — ONDANSETRON 4 MG: 2 INJECTION INTRAMUSCULAR; INTRAVENOUS at 13:42

## 2024-05-21 RX ADMIN — IOPAMIDOL 95 ML: 612 INJECTION, SOLUTION INTRAVENOUS at 14:55
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
## 2024-05-23 ENCOUNTER — TELEPHONE (OUTPATIENT)
Dept: ORTHOPEDIC SURGERY | Facility: CLINIC | Age: 34
End: 2024-05-23
Payer: COMMERCIAL

## 2024-05-23 NOTE — TELEPHONE ENCOUNTER
Patient came in yesterday 50 minutes late to his 3pm appointment. Advised patient that he would need to reschedule his appointment. Patient was offered next day appointment but he declined. Patient was reschedule to June 12th.

## 2024-05-27 ENCOUNTER — APPOINTMENT (OUTPATIENT)
Dept: CT IMAGING | Facility: HOSPITAL | Age: 34
End: 2024-05-27
Payer: COMMERCIAL

## 2024-05-27 ENCOUNTER — HOSPITAL ENCOUNTER (EMERGENCY)
Facility: HOSPITAL | Age: 34
Discharge: HOME | End: 2024-05-27
Attending: EMERGENCY MEDICINE | Admitting: EMERGENCY MEDICINE
Payer: COMMERCIAL

## 2024-05-27 VITALS
WEIGHT: 310 LBS | OXYGEN SATURATION: 94 % | TEMPERATURE: 98 F | BODY MASS INDEX: 49.82 KG/M2 | SYSTOLIC BLOOD PRESSURE: 157 MMHG | HEART RATE: 84 BPM | RESPIRATION RATE: 26 BRPM | HEIGHT: 66 IN | DIASTOLIC BLOOD PRESSURE: 93 MMHG

## 2024-05-27 DIAGNOSIS — K80.20 CALCULUS OF GALLBLADDER WITHOUT CHOLECYSTITIS WITHOUT OBSTRUCTION: ICD-10-CM

## 2024-05-27 DIAGNOSIS — R11.2 NAUSEA AND VOMITING, UNSPECIFIED VOMITING TYPE: ICD-10-CM

## 2024-05-27 DIAGNOSIS — R10.10 PAIN OF UPPER ABDOMEN: Primary | ICD-10-CM

## 2024-05-27 LAB
ALBUMIN SERPL-MCNC: 4.1 G/DL (ref 3.5–5.2)
ALBUMIN/GLOB SERPL: 1.5 G/DL
ALP SERPL-CCNC: 94 U/L (ref 39–117)
ALT SERPL W P-5'-P-CCNC: 43 U/L (ref 1–41)
ANION GAP SERPL CALCULATED.3IONS-SCNC: 9 MMOL/L (ref 5–15)
AST SERPL-CCNC: 29 U/L (ref 1–40)
BASOPHILS # BLD AUTO: 0.07 10*3/MM3 (ref 0–0.2)
BASOPHILS NFR BLD AUTO: 0.6 % (ref 0–1.5)
BILIRUB SERPL-MCNC: 0.2 MG/DL (ref 0–1.2)
BILIRUB UR QL STRIP: NEGATIVE
BUN SERPL-MCNC: 16 MG/DL (ref 6–20)
BUN/CREAT SERPL: 14 (ref 7–25)
CALCIUM SPEC-SCNC: 9.4 MG/DL (ref 8.6–10.5)
CHLORIDE SERPL-SCNC: 105 MMOL/L (ref 98–107)
CLARITY UR: CLEAR
CO2 SERPL-SCNC: 27 MMOL/L (ref 22–29)
COLOR UR: YELLOW
CREAT SERPL-MCNC: 1.14 MG/DL (ref 0.76–1.27)
DEPRECATED RDW RBC AUTO: 39.9 FL (ref 37–54)
EGFRCR SERPLBLD CKD-EPI 2021: 87.6 ML/MIN/1.73
EOSINOPHIL # BLD AUTO: 0.3 10*3/MM3 (ref 0–0.4)
EOSINOPHIL NFR BLD AUTO: 2.4 % (ref 0.3–6.2)
ERYTHROCYTE [DISTWIDTH] IN BLOOD BY AUTOMATED COUNT: 13.7 % (ref 12.3–15.4)
GLOBULIN UR ELPH-MCNC: 2.8 GM/DL
GLUCOSE SERPL-MCNC: 194 MG/DL (ref 65–99)
GLUCOSE UR STRIP-MCNC: ABNORMAL MG/DL
HCT VFR BLD AUTO: 49.8 % (ref 37.5–51)
HGB BLD-MCNC: 15.8 G/DL (ref 13–17.7)
HGB UR QL STRIP.AUTO: NEGATIVE
HOLD SPECIMEN: NORMAL
IMM GRANULOCYTES # BLD AUTO: 0.29 10*3/MM3 (ref 0–0.05)
IMM GRANULOCYTES NFR BLD AUTO: 2.3 % (ref 0–0.5)
KETONES UR QL STRIP: NEGATIVE
LEUKOCYTE ESTERASE UR QL STRIP.AUTO: NEGATIVE
LIPASE SERPL-CCNC: 19 U/L (ref 13–60)
LYMPHOCYTES # BLD AUTO: 1.53 10*3/MM3 (ref 0.7–3.1)
LYMPHOCYTES NFR BLD AUTO: 12.3 % (ref 19.6–45.3)
MCH RBC QN AUTO: 25.9 PG (ref 26.6–33)
MCHC RBC AUTO-ENTMCNC: 31.7 G/DL (ref 31.5–35.7)
MCV RBC AUTO: 81.8 FL (ref 79–97)
MONOCYTES # BLD AUTO: 0.55 10*3/MM3 (ref 0.1–0.9)
MONOCYTES NFR BLD AUTO: 4.4 % (ref 5–12)
NEUTROPHILS NFR BLD AUTO: 78 % (ref 42.7–76)
NEUTROPHILS NFR BLD AUTO: 9.66 10*3/MM3 (ref 1.7–7)
NITRITE UR QL STRIP: NEGATIVE
NRBC BLD AUTO-RTO: 0 /100 WBC (ref 0–0.2)
PH UR STRIP.AUTO: 6.5 [PH] (ref 5–8)
PLATELET # BLD AUTO: 185 10*3/MM3 (ref 140–450)
PMV BLD AUTO: 12.1 FL (ref 6–12)
POTASSIUM SERPL-SCNC: 4.3 MMOL/L (ref 3.5–5.2)
PROT SERPL-MCNC: 6.9 G/DL (ref 6–8.5)
PROT UR QL STRIP: NEGATIVE
RBC # BLD AUTO: 6.09 10*6/MM3 (ref 4.14–5.8)
SODIUM SERPL-SCNC: 141 MMOL/L (ref 136–145)
SP GR UR STRIP: >=1.03 (ref 1–1.03)
UROBILINOGEN UR QL STRIP: ABNORMAL
WBC NRBC COR # BLD AUTO: 12.4 10*3/MM3 (ref 3.4–10.8)
WHOLE BLOOD HOLD COAG: NORMAL
WHOLE BLOOD HOLD SPECIMEN: NORMAL

## 2024-05-27 PROCEDURE — 96375 TX/PRO/DX INJ NEW DRUG ADDON: CPT

## 2024-05-27 PROCEDURE — 25510000001 IOPAMIDOL 61 % SOLUTION: Performed by: EMERGENCY MEDICINE

## 2024-05-27 PROCEDURE — 25010000002 ONDANSETRON PER 1 MG: Performed by: PHYSICIAN ASSISTANT

## 2024-05-27 PROCEDURE — 99285 EMERGENCY DEPT VISIT HI MDM: CPT

## 2024-05-27 PROCEDURE — 25010000002 KETOROLAC TROMETHAMINE PER 15 MG: Performed by: PHYSICIAN ASSISTANT

## 2024-05-27 PROCEDURE — 25810000003 SODIUM CHLORIDE 0.9 % SOLUTION: Performed by: PHYSICIAN ASSISTANT

## 2024-05-27 PROCEDURE — 85025 COMPLETE CBC W/AUTO DIFF WBC: CPT | Performed by: EMERGENCY MEDICINE

## 2024-05-27 PROCEDURE — 74177 CT ABD & PELVIS W/CONTRAST: CPT

## 2024-05-27 PROCEDURE — 80053 COMPREHEN METABOLIC PANEL: CPT | Performed by: EMERGENCY MEDICINE

## 2024-05-27 PROCEDURE — 83690 ASSAY OF LIPASE: CPT | Performed by: EMERGENCY MEDICINE

## 2024-05-27 PROCEDURE — 81003 URINALYSIS AUTO W/O SCOPE: CPT | Performed by: EMERGENCY MEDICINE

## 2024-05-27 PROCEDURE — 96374 THER/PROPH/DIAG INJ IV PUSH: CPT

## 2024-05-27 RX ORDER — DICYCLOMINE HCL 20 MG
20 TABLET ORAL EVERY 6 HOURS PRN
Qty: 20 TABLET | Refills: 0 | Status: SHIPPED | OUTPATIENT
Start: 2024-05-27 | End: 2024-06-01

## 2024-05-27 RX ORDER — SODIUM CHLORIDE 9 MG/ML
10 INJECTION, SOLUTION INTRAMUSCULAR; INTRAVENOUS; SUBCUTANEOUS AS NEEDED
Status: DISCONTINUED | OUTPATIENT
Start: 2024-05-27 | End: 2024-05-27 | Stop reason: HOSPADM

## 2024-05-27 RX ORDER — FAMOTIDINE 10 MG/ML
20 INJECTION, SOLUTION INTRAVENOUS ONCE
Status: COMPLETED | OUTPATIENT
Start: 2024-05-27 | End: 2024-05-27

## 2024-05-27 RX ORDER — ONDANSETRON 2 MG/ML
4 INJECTION INTRAMUSCULAR; INTRAVENOUS ONCE
Status: COMPLETED | OUTPATIENT
Start: 2024-05-27 | End: 2024-05-27

## 2024-05-27 RX ORDER — KETOROLAC TROMETHAMINE 15 MG/ML
15 INJECTION, SOLUTION INTRAMUSCULAR; INTRAVENOUS ONCE
Status: COMPLETED | OUTPATIENT
Start: 2024-05-27 | End: 2024-05-27

## 2024-05-27 RX ORDER — DICYCLOMINE HYDROCHLORIDE 10 MG/1
20 CAPSULE ORAL ONCE
Status: COMPLETED | OUTPATIENT
Start: 2024-05-27 | End: 2024-05-27

## 2024-05-27 RX ORDER — ONDANSETRON 4 MG/1
4 TABLET, ORALLY DISINTEGRATING ORAL EVERY 6 HOURS PRN
Qty: 15 TABLET | Refills: 0 | Status: SHIPPED | OUTPATIENT
Start: 2024-05-27 | End: 2024-06-03

## 2024-05-27 RX ADMIN — IOPAMIDOL 85 ML: 612 INJECTION, SOLUTION INTRAVENOUS at 08:29

## 2024-05-27 RX ADMIN — FAMOTIDINE 20 MG: 10 INJECTION, SOLUTION INTRAVENOUS at 08:38

## 2024-05-27 RX ADMIN — ONDANSETRON 4 MG: 2 INJECTION INTRAMUSCULAR; INTRAVENOUS at 08:38

## 2024-05-27 RX ADMIN — SODIUM CHLORIDE 1000 ML: 9 INJECTION, SOLUTION INTRAVENOUS at 08:38

## 2024-05-27 RX ADMIN — DICYCLOMINE HYDROCHLORIDE 20 MG: 10 CAPSULE ORAL at 09:53

## 2024-05-27 RX ADMIN — KETOROLAC TROMETHAMINE 15 MG: 15 INJECTION INTRAMUSCULAR at 09:53
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
## 2024-06-03 ENCOUNTER — OFFICE VISIT (OUTPATIENT)
Dept: ORTHOPEDIC SURGERY | Facility: CLINIC | Age: 34
End: 2024-06-03
Payer: COMMERCIAL

## 2024-06-03 VITALS
SYSTOLIC BLOOD PRESSURE: 140 MMHG | DIASTOLIC BLOOD PRESSURE: 90 MMHG | BODY MASS INDEX: 48.86 KG/M2 | WEIGHT: 304 LBS | HEIGHT: 66 IN

## 2024-06-03 VITALS
WEIGHT: 304 LBS | HEIGHT: 66 IN | BODY MASS INDEX: 48.86 KG/M2 | SYSTOLIC BLOOD PRESSURE: 134 MMHG | DIASTOLIC BLOOD PRESSURE: 84 MMHG

## 2024-06-03 DIAGNOSIS — Z09 SURGERY FOLLOW-UP: Primary | ICD-10-CM
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
## 2024-06-03 DIAGNOSIS — E66.01 OBESITY, MORBID, BMI 40.0-49.9: ICD-10-CM

## 2024-06-03 DIAGNOSIS — M25.561 RIGHT KNEE PAIN, UNSPECIFIED CHRONICITY: Primary | ICD-10-CM

## 2024-06-03 PROCEDURE — 99213 OFFICE O/P EST LOW 20 MIN: CPT | Performed by: ORTHOPAEDIC SURGERY
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
## 2024-06-03 NOTE — PROGRESS NOTES
"                          [facility] Follow Up     Office Follow Up Visit     Date: 06/03/2024   Patient Name: Stef Grey  MRN: [id number]  YOB: 1990  Chief Complaint:   Chief Complaint   Patient presents with    Follow-up     3 month follow up - 6 months S/P Deep hardware removal right ankle (DOS: 1/4/24)     History of Present Illness:   Stef Grey is a 34 y.o. male who is here today for follow up for follow-up following right ankle hardware removal in January 4.  Continues to work at [employer] where he is on his feet walking a lot for most of the day.  Denies any ankle pain.  States occasionally uses cane at work because he thinks it helps with his balance.  States knee pain has improved.  States this can feel the hardware in his wrist and has a appointment scheduled with Dr. Medina to discuss possible hardware removal in his left wrist.  Is overall very happy with his right ankle and has no complaints with regard to his right ankle today.    Subjective   I reviewed the patient's chief complaint, history of present illness, review of systems, past medical history, surgical history, family history, social history, medications and allergy list   Objective    Vital Signs:   Vitals:    06/03/24 1252   BP: 140/90   Weight: (!) 138 kg (304 lb)   Height: 167.6 cm (65.98\")     Body mass index is 49.09 kg/m².    Ortho Exam:  right LE Foot and Ankle Exam:   Leg compartments soft and compressible.  Incision lateral ankle well-healed..  Able to actively plantarflex and dorsiflex ankle and all toes.  Appropriate swelling for this stage of healing about the foot and ankle.  Toes warm and well-perfused.  Brisk cap refill in all toes.  Ambulated in clinic with no discomfort.  Able to do single limb heel rise on the right with no discomfort or trouble.    Results Review:  XR Ankle 3+ View Right  Right Ankle X-Ray 06/03/24   Indication: Pain  Views: 3 weight bearing , " comparison to previous  Findings: xrays reviewed by me today in the office and show ankle mortise   congruent and well-maintained, postsurgical changes visible about the   distal fibula and distal tibia consistent with previous hardware, no acute   osseous abnormality          Assessment / Plan    Assessment/Plan:   Diagnoses and all orders for this visit:    1. Surgery follow-up (Primary)  -     XR Ankle 3+ View Right      Patient is now about 5 months out from his hardware removal in about 8 to 9 months out from his original injury.  Feels like his ankle has made a full recovery.  Denies any pain and is very happy with his progress.  He did talk about having to occasionally use a cane for issues of with balance with extended walking.  I talked with him about returning to physical therapy to work on this however.  He would like to defer at this time.  At this point I will see this patient back in 1 year for repeat x-rays and reevaluation.  Happy with his progress.  Was a pleasure seeing him today.    Follow Up:   Return in about 1 year (around 6/3/2025) for Recheck, F/U with Images.      Venancio Blum MD  [facility] Orthopedic Surgeon

## 2024-06-12 ENCOUNTER — OFFICE VISIT (OUTPATIENT)
Dept: ORTHOPEDIC SURGERY | Facility: CLINIC | Age: 34
End: 2024-06-12
Payer: COMMERCIAL

## 2024-06-12 VITALS
SYSTOLIC BLOOD PRESSURE: 146 MMHG | HEIGHT: 66 IN | WEIGHT: 304 LBS | BODY MASS INDEX: 48.86 KG/M2 | DIASTOLIC BLOOD PRESSURE: 84 MMHG

## 2024-06-12 DIAGNOSIS — Z98.890 S/P ORIF (OPEN REDUCTION INTERNAL FIXATION) FRACTURE: Primary | ICD-10-CM

## 2024-06-12 DIAGNOSIS — Z87.81 S/P ORIF (OPEN REDUCTION INTERNAL FIXATION) FRACTURE: Primary | ICD-10-CM

## 2024-06-12 NOTE — PROGRESS NOTES
[facility]     Follow-up Office Visit       Date: 06/12/2024   Patient Name: Stef Grey  MRN: [id number]  YOB: 1990    Chief Complaint:   Chief Complaint   Patient presents with    Follow-up     7 month f/u; 8 months s/p left open reduction internal fixation distal radius (DOS 10/5/23)       History of Present Illness:   Stef Grey is a 34 y.o. male presents for follow-up of left distal radius status post open reduction internal fixation of October 2023.  Patient has been doing well since the last visit.  He reports he has full range of motion of the wrist.  Reports he does occasionally have some radial sided wrist pain with certain activities.  He is also interested in hardware removal.      Subjective   Review of Systems:   Review of Systems   Constitutional:  Negative for chills, fever, unexpected weight gain and unexpected weight loss.   HENT:  Negative for congestion, postnasal drip and rhinorrhea.    Eyes:  Negative for blurred vision.   Respiratory:  Negative for shortness of breath.    Cardiovascular:  Negative for leg swelling.   Gastrointestinal:  Negative for abdominal pain, nausea and vomiting.   Genitourinary:  Negative for difficulty urinating.   Musculoskeletal:  Positive for arthralgias. Negative for gait problem, joint swelling and myalgias.   Skin:  Negative for skin lesions and wound.   Neurological:  Negative for dizziness, weakness, light-headedness and numbness.   Hematological:  Does not bruise/bleed easily.   Psychiatric/Behavioral:  Negative for depressed mood.         Pertinent review of systems per HPI    I reviewed the patient's chief complaint, history of present illness, review of systems, past medical history, surgical history, family history, social history, medications and allergy list in the EMR on 06/12/2024 and agree with the findings above.    Objective    Vital    Neonatology Daily Progress Note    Paulette Funez is a 2 week old female infant  MRN: [id number]  Gestational Age: 33w5d  Birth weight: 2030 g   DOL: 19 days    PMA: 36w3d    HOSPITAL PROBLEMS:  Principal Problem:     , gestational age 33 completed weeks (CMD)  Active Problems:    Low birth weight or  infant, 5943-9063 grams (CMD)     infant of preeclamptic mother    Slow feeding in   Resolved Problems:    TTN (transient tachypnea of )       INTERVAL EVENTS SINCE PREVIOUS NOTE:  -No acute events overnight  -Weight change: 60 g    PHYSICAL EXAM    Vital signs:     Vital Last Value 24 Hour Range   Temperature 98.2 °F (36.8 °C) (24 0600) Temp  Min: 97.7 °F (36.5 °C)  Max: 98.2 °F (36.8 °C)   Pulse (!) 180 (24) Pulse  Min: 135  Max: 180   Respiratory 40 (24 06) Resp  Min: 28  Max: 78   Non-Invasive  Blood Pressure (!) 83/55 (taken x3) (24 0300) BP  Min: 60/42  Max: 91/59   Pulse Oximetry 98 % (24 06) SpO2  Min: 95 %  Max: 100 %   Arterial   Blood Pressure   No data recorded     General: Well appearing, no acute distress, responds to stimuli  HENT: AFSOF, normocephalic, ears normally set, no cleft, palate intact; NG in place  Neck: supple, full range of motion  CV: RRR, no murmurs, 2+ pulses, good perfusion  Lungs: clear and equal bilaterally, no retractions, no nasal flaring  Abdomen: soft, non-tender, non-distended, no organomegaly  Extremities: negative O/B; FROM x 4  Neuro: good tone  Skin: no rash  : normal for GA; anus patent  Back: no ghislaine, no dimples    Labs (Last 24 hours)  No results found for this or any previous visit (from the past 24 hour(s)).      ASSESSMENT AND PLAN BY SYSTEM       FLUID ELECTROLYTES NUTRITION     Weight Length Head circumference      Wt Readings from Last 2 Encounters:   24 (!) 2230 g (15%, Z= -1.05)*     * Growth percentiles are based on Bradley (Girls, 22-50 Weeks) data.      current - 18.9\"  "Signs:   Vitals:    06/12/24 1421   BP: 146/84   Weight: (!) 138 kg (304 lb)   Height: 167.6 cm (65.98\")     BMI: Body mass index is 49.09 kg/m².     General Appearance: No acute distress. Alert and oriented.     Chest:  Non-labored breathing on room air      Ortho Exam:  Left wrist without obvious deformity.  There is slightly hypertrophic scar from his prior distal radius incision.  There is callus palpable along the dorsal distal radius.  Nontender throughout the distal radius.  Nontender over the radial styloid.  Negative Finkelstein's test.  Nontender over the DRUJ.  Nontender over the distal ulna.  DRUJ stable in pronation and supination.  Fingers are all warm and well-perfused distally.  Sensation intact to light touch in the median, radial and ulnar nerve distributions    Imaging/Studies:   Imaging Results (Last 24 Hours)       Procedure Component Value Units Date/Time    XR Wrist 3+ View Left [[id number]] Resulted: 06/12/24 1432     Updated: 06/12/24 1433    Narrative:      Left Wrist X-Ray    Indication: s/p ORIF    Views:  AP, Lateral, and Oblique     Comparison: 12/22/23    Findings:  Patient is status post ORIF of the distal radius.  The fracture is healed  Normal soft tissues.  Normal joint spaces      Impression:  X-ray of the left wrist personally reviewed by me.  Fracture   of the left distal radius s/p ORIF now with healing of the fracture..            Procedures:  Procedures    Quality Measures:   ACP:   ACP discussion was deferred.    Tobacco:   Stef Grey  reports that he has never smoked. He has been exposed to tobacco smoke. He has never used smokeless tobacco.    Assessment / Plan    Assessment/Plan:      Diagnosis Plan   1. S/P ORIF (open reduction internal fixation) fracture  XR Wrist 3+ View Left    External Facility Surgical/Procedural Request          Presents for follow-up of left distal radius fracture.  He has excellent range of motion and use of the left hand and his " (48 cm) current - 31 cm (12.21\")   Weight change: 60 g          Dosing Weight: 2030 g          No results found    POC GLUCOSE:   No results available in last 24 hours    Alkaline Phosphatase:  No results found       INPUT:    IVF:     No IVFs being administered         Feeding:     Neosure 22kcal 42mL q3H          GIR: NA mg/kg/min        % PO: 30       OUTPUT:    Intake/Output          0700  06/14 0659  0700  /15 0659    P.O. (mL/kg) 98 (43.95)     NG/GT (mL/kg) 234 (104.93)     Total Intake(mL/kg) 332 (148.88)     Urine (mL/kg/hr) 0 (0)     Stool (mL/kg/hr) 1 (0.02)     Total Output(mL/kg) 1 (0.45)     Net +331           Unmeasured Urine Occurrence 6 x     Unmeasured Stool Occurrence 2 x              Assessment:    infant and slow feeding of     Plan:  - Continue feeds of Neosure 22kcal to 42 mL q3H  - ST on consult  - donor consent refused    CENTRAL LINE AND CATHETER DISCUSSION     Central Line  Type of Central Line:   Peripheral IV 24 Left Hand 24 (Inactive )       GI Tube 24 Orogastric Oral cavity (Active)     Line Functioning appropriately: NA  Necessity/Indication: No Central line in place  Continued need for Central Line discussed: NA 2024    Urinary Catheter  Necessity/Indication: N/A  Continued need for Urinary Catheter discussed: N/A      PAIN/SEDATION     NPASS Pain Score  Min: 0  Max: 1    Pain/Sedation Medications: None in use    Plan:   - continue to monitor N-PASS scores      BILIRUBIN     Assessment:No history of hyperbilirubinemia    Baby's blood type:  No results found  Maternal blood type  A+  Information for the patient's mother:  Elvira Funez [[id number]]   No results found Antibody:   Information for the patient's mother:  Elvira Funez [[id number]]   No results found     Last Bilirubin:   No results found     Light level: 13.4 mg/dL    Plan:   -Recheck bilirubin PRN    APNEA/BRADYCARDIA/DESATURATION     Assessment: N/A    24H Events:    Patient Vitals for the past 24 hrs:   Bradycardia Rate Intervention Activity Prior to Event   06/14/24 0151 78 Self limiting Sleeping       Last Significant Event: 6/14/24       -Medications: - No medications  - Caffeine loading dose 20mg/kg/dose - Yes Date 2024    Plan:   - Clinically monitor    RESPIRATORY     Mode: Room Air      Ventilator days: 0  Days on CPAP/HFNC: 5/26-5/27; 5/28-6/3  Days on Oxygen: 5/26-5/27; 5/28-6/3     Assessment: Respiratory distress    Intubation  Necessity/Indication: Not Intubated  Readiness for Extubation discussed: N/A 2024    Plan:   - continue SpO2 monitoring  - Monitor clinically     CARDIOVASCULAR     Assessment: No active cardiac issues     No valid procedures specified.     Plan:  - continue to monitor clinically    HEMATOLOGY     Assessment: No active hematologic issues    No results found      Transfusions:   PRBC: None    Platelet: None    FFP: None     Plan:  - monitor clinically    INFECTIOUS DISEASE     Assessment: Sepsis considered ruled out    No results found      Microbiology Results       None           - Blood culture and CBC on admission:  CBC   - Initial antibiotic course: No Antibiotics were initiated due to low risk of Sepsis - But will initiate later, if clinically indicated.       Plan:  - continue to monitor clinically     NEUROLOGY     Assessment: No active neurologic concerns    Head Ultrasound Day of LIfe 7: Not indicated  Head Ultrasound Day of Life 28: Not indicated    Plan:   - continue to monitor clinically    OPHTHALMOLOGY     Assessment: No active ophthalmology issues    NA  Last Ophthalmologic exam:    Next Ophthalmologic exam due:      Plan:   - ROP Exam as per unit policy @ 4-6 weeks of age    MEDICATIONS     Current Facility-Administered Medications   Medication    hepatitis B VACCINE (RECOMBIVAX-HB) 5 MCG/0.5ML vaccine 5 mcg    pediatric multivitamin (POLY-VI-SOL) oral solution 1 mL        DISCHARGE TASKS         HEALTH MAINTENANCE  fracture is healed on x-ray.  He does have some mild pain along his radial wrist but no evidence of de Quervain's tenosynovitis or tenderness over the fracture site on exam.  The patient would like removal of his left wrist hardware now that the fracture is healed.  We will plan to perform left wrist hardware removal at patient's earliest convenience.    Consent-left wrist deep hardware removal    The risks and benefits of the procedure were discussed with the patient and or appropriate guardian, which include but are not limited to the risk of bleeding, infection, neurovascular damage, post-operative stiffness, recurrence, tendon and/or ligament retears, recurrent instability, continued pain, arthritic pain, need for further revision surgeries in the future, deep venous thrombosis, and general risks from anesthesia. We also discussed the post-operative rehabilitation, the need for physical therapy, and the overall expected outcomes from the procedure. We also discussed the possible use of biologics including allograft. We allowed proper time and answered the patient's questions regarding the procedure. The patient expressed understanding. Knowing what the risks are and what the conservative treatment is, the patient elected to forgo any further conservative treatment options and proceed with the surgical intervention.      Follow Up:   Return for Follow Up after Post Op.        Kevin Medina MD  [facility] Upper Extremity Surgeon   AND DISCHARGE PLANNING     Immunizations:   Most Recent Immunizations   Administered Date(s) Administered    None   Pended Date(s) Pended    Hep B, adolescent or pediatric 2024      Hearing Test:      [place] ROP Eye Exam Needed?: No (24 1200) No    Car Seat Screen:      CCHD Screening:   Screening complete:    Right hand reading %:    Foot reading %:    CHD:      Circumcision:      [place] Screen- date drawn (most recent results):    Last 3 results:      Synagis Candidate:      Pediatrician: TBD    PARENTAL COMMUNICATION     Family present during rounds: No.      Solange Dean MD  PGY-2 Pediatrics  [facility]        2024  8:03 AM    Patient was evaluated by Resident and me. I was present for key portions of the assessment. I have reviewed the EMR, pertinent physical exam findings, and multidisciplinary team recommendations. I have made appropriate addendums and agree with the assessment and plan as outlined in the above note. The medical decision making was ultimately made by me.      Nohelia Treadwell MD  Attending Neonatologist  2024  11:06 AM

## 2024-08-12 ENCOUNTER — TELEPHONE (OUTPATIENT)
Dept: ORTHOPEDIC SURGERY | Facility: CLINIC | Age: 34
End: 2024-08-12
Payer: COMMERCIAL

## 2024-08-12 NOTE — TELEPHONE ENCOUNTER
Calling to get an update on his surgery arrival time. Patient did not want to be transferred to their direct line.

## 2024-08-13 ENCOUNTER — OUTSIDE FACILITY SERVICE (OUTPATIENT)
Dept: ORTHOPEDIC SURGERY | Facility: CLINIC | Age: 34
End: 2024-08-13
Payer: COMMERCIAL

## 2024-08-13 ENCOUNTER — DOCUMENTATION (OUTPATIENT)
Dept: ORTHOPEDIC SURGERY | Facility: CLINIC | Age: 34
End: 2024-08-13

## 2024-08-13 DIAGNOSIS — Z98.890 POST-OPERATIVE STATE: Primary | ICD-10-CM

## 2024-08-13 RX ORDER — OXYCODONE HYDROCHLORIDE 5 MG/1
5 TABLET ORAL EVERY 6 HOURS PRN
Qty: 12 TABLET | Refills: 0 | Status: SHIPPED | OUTPATIENT
Start: 2024-08-13

## 2024-08-13 NOTE — PROGRESS NOTES
DATE OF PROCEDURE: 08/13/2024    LOCATION: [facility]     PROCEDURES PERFORMED:    Removal of hardware left wrist (deep) CPT 56327    SURGEON: Kevin Medina MD      ASSISTANTS:    1. None        * Surgery not found *      ANESTHESIA: General    PREOPERATIVE DIAGNOSES:  Right distal radius hardware     POSTOPERATIVE DIAGNOSES:    Same     ESTIMATED BLOOD LOSS: 5 mL.    SPECIMENS: None    IMPLANTS: None    COMPLICATIONS: None     INDICATIONS:  Stef Grey is a 34 y.o. male who initially presented with with a comminuted intra-articular left distal radius fracture in 2023.  He underwent open reduction internal fixation at that time.  He had no postoperative complications but he desired the hardware removed after adequate healing of his fracture..  The risks, benefits, alternatives and potential complications of surgery were discussed with the patient including but not limited to bleeding scarring, infection, recurrence, stiffness, damage to surrounding structures or postoperative pain.  The patient understands the risks and agreed to proceed with surgery.  A surgical informed consent was signed prior to the procedure.     DESCRIPTION OF PROCEDURE:      The patient was greeted in the pre-operative holding area and the surgical site was marked and consent confirmed prior to bringing the patient to the operating room.  The patient was then taken to the operating room and a timeout was performed including the patient's name, procedure and antibiotic administration prior to the patient receiving general anesthesia.  The patient was positioned supine on the operative room table and a non-sterile tourniquet was applied to the left upper extremity and it was then prepped and draped in the usual sterile fashion.  The patient received the appropriate antibiotics within 1 hour of skin incision.     The left upper extremity was exsanguinated and the tourniquet set to 250 mmHg.       Patient's previous skin incision was incised and careful blunt dissection used to carry the incision down through scar tissue including the FCR sheath until the redraped pronator quadratus was encountered.  This was then incised sharply and blunt dissection used to free the surrounding scar tissue away from the underlying distal radius plate.  All of the screws securing the plate were then removed.  The plate was then removed and any hypertrophied callus was gently debrided with a rongeur.  Plate removal was then confirmed via fluoroscopy.  It was then flexed and extended without crepitus.  The wound was irrigated with copious amounts normal saline solution.  The tourniquet was let down and hemostasis achieved with bipolar electrocautery.  Skin was closed with 4-0 Monocryl in deep dermal fashion followed by 4 Monocryl in running subcuticular fashion.     At the end of the procedure the patient was awoken from anesthesia and transferred to the PACU in stable condition.  I participated in all parts of the case.       POSTOPERATIVE PLAN:  Dressing down at follow-up  X-rays at first follow-up  No lifting greater than 5 to 10 pounds until first follow-up.  A  s needed oxycodone, Tylenol and ibuprofen for pain  Left upper extremity elevation  Begin wrist range of motion as tolerated at first follow-up.

## 2024-08-19 ENCOUNTER — DOCUMENTATION (OUTPATIENT)
Dept: ORTHOPEDIC SURGERY | Facility: CLINIC | Age: 34
End: 2024-08-19
Payer: COMMERCIAL

## 2024-08-19 NOTE — PROGRESS NOTES
Left Wrist Intra-operative Fluoroscopy    Date: 08/13/2024    Location: [facility]    Indication: Intra-operative Fluoroscopy    Views:  AP, Lateral, and Oblique     Comparison: Pre-operative images    Findings:  Patient is status post removal of left wrist distal radius plate.  All hardware is removed  Normal soft tissues.  Normal joint spaces      Impression: Status post removal of left wrist hardware with removal of all hardware confirmed

## 2024-08-28 ENCOUNTER — OFFICE VISIT (OUTPATIENT)
Dept: ORTHOPEDIC SURGERY | Facility: CLINIC | Age: 34
End: 2024-08-28
Payer: COMMERCIAL

## 2024-08-28 VITALS — TEMPERATURE: 97.8 F

## 2024-08-28 DIAGNOSIS — Z98.890 POST-OPERATIVE STATE: Primary | ICD-10-CM

## 2024-08-28 NOTE — PROGRESS NOTES
[facility]     Follow-up Office Visit       Date: 08/28/2024   Patient Name: Stef Grey  MRN: [id number]  YOB: 1990    Chief Complaint:   Chief Complaint   Patient presents with    Post-op     2 wk s/p-Removal of hardware left wrist (deep)        History of Present Illness:   Stef Grey is a 34 y.o. male presents for 2-week follow-up status post removal left distal radius plate from the left wrist.  Patient has been doing well since surgery.  Reports pain is well controlled.  No other concerns at this time.      Subjective   Review of Systems:   Review of Systems   Constitutional:  Negative for chills, fever, unexpected weight gain and unexpected weight loss.   HENT:  Negative for congestion, postnasal drip and rhinorrhea.    Eyes:  Negative for blurred vision.   Respiratory:  Negative for shortness of breath.    Cardiovascular:  Negative for leg swelling.   Gastrointestinal:  Negative for abdominal pain, nausea and vomiting.   Genitourinary:  Negative for difficulty urinating.   Musculoskeletal:  Positive for arthralgias. Negative for gait problem, joint swelling and myalgias.   Skin:  Negative for skin lesions and wound.   Neurological:  Negative for dizziness, weakness, light-headedness and numbness.   Hematological:  Does not bruise/bleed easily.   Psychiatric/Behavioral:  Negative for depressed mood.         Pertinent review of systems per HPI    I reviewed the patient's chief complaint, history of present illness, review of systems, past medical history, surgical history, family history, social history, medications and allergy list in the EMR on 08/28/2024 and agree with the findings above.    Objective    Vital Signs:   Vitals:    08/28/24 1515   Temp: 97.8 °F (36.6 °C)     BMI: There is no height or weight on file to calculate BMI.     General Appearance: No acute distress. Alert and oriented.      Chest:  Non-labored breathing on room air      Ortho Exam:  Left wrist incision clear dry intact and healing appropriately  Fingers warm and well-perfused distally  Sensation intact to light touch in the median, radial and ulnar nerve distributions    Imaging/Studies:   Imaging Results (Last 24 Hours)       Procedure Component Value Units Date/Time    XR Wrist 3+ View Left [[id number]] Resulted: 08/28/24 1530     Updated: 08/28/24 1531    Narrative:      Left Wrist X-Ray    Indication: Pain    Views:  AP, Lateral, and Oblique     Comparison:  6/12/24    Findings:  Status post removal of hardware from left wrist  Normal soft tissues  Normal joint spaces    Impression:   Status post removal of hardware from left wrist              Procedures:  Procedures    Quality Measures:   ACP:   ACP discussion was deferred.    Tobacco:   Stef Grey  reports that he has never smoked. He has been exposed to tobacco smoke. He has never used smokeless tobacco.    Assessment / Plan    Assessment/Plan:      Diagnosis Plan   1. Post-operative state  XR Wrist 3+ View Left          Patient presents 2 weeks status post removal of volar locking plate from the left distal radius.  Patient is doing well postoperatively and has no issues with range of motion or healing.  Recommend home therapy program.  Recommend patient return to work without restrictions next Wednesday.  Recommend patient follow-up with me in 3 months for repeat exam.    Follow Up:   Return in about 3 months (around 11/28/2024).        Kevin Medina MD  [facility] [place] Hand and Upper Extremity Surgeon

## 2024-08-30 ENCOUNTER — APPOINTMENT (OUTPATIENT)
Dept: CT IMAGING | Facility: HOSPITAL | Age: 34
End: 2024-08-30
Payer: COMMERCIAL

## 2024-08-30 ENCOUNTER — HOSPITAL ENCOUNTER (EMERGENCY)
Facility: HOSPITAL | Age: 34
Discharge: HOME | End: 2024-08-30
Attending: EMERGENCY MEDICINE
Payer: COMMERCIAL

## 2024-08-30 VITALS
TEMPERATURE: 98.4 F | SYSTOLIC BLOOD PRESSURE: 151 MMHG | BODY MASS INDEX: 48.21 KG/M2 | WEIGHT: 300 LBS | RESPIRATION RATE: 18 BRPM | HEIGHT: 66 IN | OXYGEN SATURATION: 93 % | HEART RATE: 93 BPM | DIASTOLIC BLOOD PRESSURE: 83 MMHG

## 2024-08-30 DIAGNOSIS — A08.4 VIRAL GASTROENTERITIS: Primary | ICD-10-CM

## 2024-08-30 DIAGNOSIS — R11.2 NAUSEA AND VOMITING, UNSPECIFIED VOMITING TYPE: ICD-10-CM

## 2024-08-30 DIAGNOSIS — R19.5 ABNORMAL STOOL COLOR: ICD-10-CM

## 2024-08-30 LAB
ALBUMIN SERPL-MCNC: 4.6 G/DL (ref 3.5–5.2)
ALBUMIN/GLOB SERPL: 1.4 G/DL
ALP SERPL-CCNC: 89 U/L (ref 39–117)
ALT SERPL W P-5'-P-CCNC: 28 U/L (ref 1–41)
ANION GAP SERPL CALCULATED.3IONS-SCNC: 14 MMOL/L (ref 5–15)
AST SERPL-CCNC: 19 U/L (ref 1–40)
BACTERIA UR QL AUTO: ABNORMAL /HPF
BASOPHILS # BLD AUTO: 0.07 10*3/MM3 (ref 0–0.2)
BASOPHILS NFR BLD AUTO: 0.5 % (ref 0–1.5)
BILIRUB SERPL-MCNC: 0.9 MG/DL (ref 0–1.2)
BILIRUB UR QL STRIP: NEGATIVE
BUN SERPL-MCNC: 17 MG/DL (ref 6–20)
BUN/CREAT SERPL: 17.7 (ref 7–25)
CALCIUM SPEC-SCNC: 9.6 MG/DL (ref 8.6–10.5)
CHLORIDE SERPL-SCNC: 103 MMOL/L (ref 98–107)
CLARITY UR: ABNORMAL
CO2 SERPL-SCNC: 23 MMOL/L (ref 22–29)
COLOR UR: YELLOW
CREAT SERPL-MCNC: 0.96 MG/DL (ref 0.76–1.27)
D-LACTATE SERPL-SCNC: 1.8 MMOL/L (ref 0.5–2)
DEPRECATED RDW RBC AUTO: 41.3 FL (ref 37–54)
DEVELOPER EXPIRATION DATE: NORMAL
DEVELOPER LOT NUMBER: NORMAL
EGFRCR SERPLBLD CKD-EPI 2021: 107.7 ML/MIN/1.73
EOSINOPHIL # BLD AUTO: 0.86 10*3/MM3 (ref 0–0.4)
EOSINOPHIL NFR BLD AUTO: 6.1 % (ref 0.3–6.2)
ERYTHROCYTE [DISTWIDTH] IN BLOOD BY AUTOMATED COUNT: 14.5 % (ref 12.3–15.4)
EXPIRATION DATE: NORMAL
FECAL OCCULT BLOOD SCREEN, POC: NEGATIVE
GLOBULIN UR ELPH-MCNC: 3.3 GM/DL
GLUCOSE SERPL-MCNC: 144 MG/DL (ref 65–99)
GLUCOSE UR STRIP-MCNC: NEGATIVE MG/DL
HCT VFR BLD AUTO: 53 % (ref 37.5–51)
HGB BLD-MCNC: 17.2 G/DL (ref 13–17.7)
HGB UR QL STRIP.AUTO: NEGATIVE
HOLD SPECIMEN: NORMAL
HYALINE CASTS UR QL AUTO: ABNORMAL /LPF
IMM GRANULOCYTES # BLD AUTO: 0.13 10*3/MM3 (ref 0–0.05)
IMM GRANULOCYTES NFR BLD AUTO: 0.9 % (ref 0–0.5)
KETONES UR QL STRIP: ABNORMAL
LEUKOCYTE ESTERASE UR QL STRIP.AUTO: NEGATIVE
LIPASE SERPL-CCNC: 14 U/L (ref 13–60)
LYMPHOCYTES # BLD AUTO: 1.97 10*3/MM3 (ref 0.7–3.1)
LYMPHOCYTES NFR BLD AUTO: 13.9 % (ref 19.6–45.3)
Lab: NORMAL
MCH RBC QN AUTO: 26.6 PG (ref 26.6–33)
MCHC RBC AUTO-ENTMCNC: 32.5 G/DL (ref 31.5–35.7)
MCV RBC AUTO: 82 FL (ref 79–97)
MONOCYTES # BLD AUTO: 1.21 10*3/MM3 (ref 0.1–0.9)
MONOCYTES NFR BLD AUTO: 8.5 % (ref 5–12)
NEGATIVE CONTROL: NEGATIVE
NEUTROPHILS NFR BLD AUTO: 70.1 % (ref 42.7–76)
NEUTROPHILS NFR BLD AUTO: 9.96 10*3/MM3 (ref 1.7–7)
NITRITE UR QL STRIP: NEGATIVE
NRBC BLD AUTO-RTO: 0 /100 WBC (ref 0–0.2)
PH UR STRIP.AUTO: 5.5 [PH] (ref 5–8)
PLATELET # BLD AUTO: 243 10*3/MM3 (ref 140–450)
PMV BLD AUTO: 11.7 FL (ref 6–12)
POSITIVE CONTROL: POSITIVE
POTASSIUM SERPL-SCNC: 3.8 MMOL/L (ref 3.5–5.2)
PROT SERPL-MCNC: 7.9 G/DL (ref 6–8.5)
PROT UR QL STRIP: ABNORMAL
RBC # BLD AUTO: 6.46 10*6/MM3 (ref 4.14–5.8)
RBC # UR STRIP: ABNORMAL /HPF
REF LAB TEST METHOD: ABNORMAL
SODIUM SERPL-SCNC: 140 MMOL/L (ref 136–145)
SP GR UR STRIP: 1.03 (ref 1–1.03)
SQUAMOUS #/AREA URNS HPF: ABNORMAL /HPF
UROBILINOGEN UR QL STRIP: ABNORMAL
WBC # UR STRIP: ABNORMAL /HPF
WBC NRBC COR # BLD AUTO: 14.2 10*3/MM3 (ref 3.4–10.8)
WHOLE BLOOD HOLD COAG: NORMAL
WHOLE BLOOD HOLD SPECIMEN: NORMAL

## 2024-08-30 PROCEDURE — 74177 CT ABD & PELVIS W/CONTRAST: CPT

## 2024-08-30 PROCEDURE — 83690 ASSAY OF LIPASE: CPT | Performed by: EMERGENCY MEDICINE

## 2024-08-30 PROCEDURE — 83605 ASSAY OF LACTIC ACID: CPT | Performed by: PHYSICIAN ASSISTANT

## 2024-08-30 PROCEDURE — 81001 URINALYSIS AUTO W/SCOPE: CPT | Performed by: EMERGENCY MEDICINE

## 2024-08-30 PROCEDURE — 85025 COMPLETE CBC W/AUTO DIFF WBC: CPT | Performed by: EMERGENCY MEDICINE

## 2024-08-30 PROCEDURE — 25010000002 ONDANSETRON PER 1 MG: Performed by: PHYSICIAN ASSISTANT

## 2024-08-30 PROCEDURE — 80053 COMPREHEN METABOLIC PANEL: CPT | Performed by: EMERGENCY MEDICINE

## 2024-08-30 PROCEDURE — 99285 EMERGENCY DEPT VISIT HI MDM: CPT

## 2024-08-30 PROCEDURE — 25510000001 IOPAMIDOL 61 % SOLUTION: Performed by: EMERGENCY MEDICINE

## 2024-08-30 PROCEDURE — 96374 THER/PROPH/DIAG INJ IV PUSH: CPT

## 2024-08-30 PROCEDURE — 25810000003 SODIUM CHLORIDE 0.9 % SOLUTION: Performed by: PHYSICIAN ASSISTANT

## 2024-08-30 PROCEDURE — 82270 OCCULT BLOOD FECES: CPT | Performed by: PHYSICIAN ASSISTANT

## 2024-08-30 RX ORDER — SODIUM CHLORIDE 0.9 % (FLUSH) 0.9 %
10 SYRINGE (ML) INJECTION AS NEEDED
Status: DISCONTINUED | OUTPATIENT
Start: 2024-08-30 | End: 2024-08-31 | Stop reason: HOSPADM

## 2024-08-30 RX ORDER — ONDANSETRON 2 MG/ML
4 INJECTION INTRAMUSCULAR; INTRAVENOUS ONCE
Status: COMPLETED | OUTPATIENT
Start: 2024-08-30 | End: 2024-08-30

## 2024-08-30 RX ORDER — PROMETHAZINE HYDROCHLORIDE 12.5 MG/1
12.5 TABLET ORAL EVERY 8 HOURS PRN
Qty: 9 TABLET | Refills: 0 | Status: SHIPPED | OUTPATIENT
Start: 2024-08-30 | End: 2024-09-02

## 2024-08-30 RX ORDER — IOPAMIDOL 612 MG/ML
100 INJECTION, SOLUTION INTRAVASCULAR
Status: COMPLETED | OUTPATIENT
Start: 2024-08-30 | End: 2024-08-30

## 2024-08-30 RX ORDER — SODIUM CHLORIDE 9 MG/ML
10 INJECTION, SOLUTION INTRAMUSCULAR; INTRAVENOUS; SUBCUTANEOUS AS NEEDED
Status: DISCONTINUED | OUTPATIENT
Start: 2024-08-30 | End: 2024-08-31 | Stop reason: HOSPADM

## 2024-08-30 RX ADMIN — ONDANSETRON 4 MG: 2 INJECTION INTRAMUSCULAR; INTRAVENOUS at 22:36

## 2024-08-30 RX ADMIN — SODIUM CHLORIDE 1000 ML: 9 INJECTION, SOLUTION INTRAVENOUS at 21:25

## 2024-08-30 RX ADMIN — IOPAMIDOL 75 ML: 612 INJECTION, SOLUTION INTRAVENOUS at 20:16
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
## 2024-12-04 ENCOUNTER — OFFICE VISIT (OUTPATIENT)
Dept: ORTHOPEDIC SURGERY | Facility: CLINIC | Age: 34
End: 2024-12-04
Payer: COMMERCIAL

## 2024-12-04 VITALS
DIASTOLIC BLOOD PRESSURE: 90 MMHG | HEIGHT: 66 IN | BODY MASS INDEX: 50.3 KG/M2 | WEIGHT: 313 LBS | SYSTOLIC BLOOD PRESSURE: 158 MMHG

## 2024-12-04 DIAGNOSIS — Z98.890 POST-OPERATIVE STATE: Primary | ICD-10-CM

## 2024-12-04 NOTE — PROGRESS NOTES
"                                                                 [facility] Orthopedic     Follow-up Office Visit       Date: 12/04/2024   Patient Name: Stef Grey  MRN: [id number]  YOB: 1990    Chief Complaint:   Chief Complaint   Patient presents with    Follow-up       3 month follow up - 3.5 month s/p-Removal of hardware left wrist (deep) (DOS 8/19/24)           History of Present Illness:   Stef Grey is a 34 y.o. male presents for follow-up of removal of hardware of left wrist 3 and half months ago.  Has been doing well since his last visit.  He has full use and range of motion of his left wrist without pain.  No new concerns at this time.      Subjective   Review of Systems:   Review of Systems     Pertinent review of systems per HPI    I reviewed the patient's chief complaint, history of present illness, review of systems, past medical history, surgical history, family history, social history, medications and allergy list in the EMR on 12/04/2024 and agree with the findings above.    Objective    Vital Signs:   Vitals:    12/04/24 1543   BP: 158/90   Weight: (!) 142 kg (313 lb)   Height: 167.6 cm (66\")     BMI: Body mass index is 50.52 kg/m².     General Appearance: No acute distress. Alert and oriented.     Chest:  Non-labored breathing on room air      Ortho Exam:  Left distal radius incision well-healed with some hypertrophic scar.  Nontender to palpation throughout the left wrist.  No deformity.  Fingers warm and well-perfused distally  Sensation intact to light touch in the median, radial and ulnar nerve distributions    Imaging/Studies:   Imaging Results (Last 24 Hours)       Procedure Component Value Units Date/Time    XR Wrist 3+ View Left [[id number]] Resulted: 12/04/24 1600     Updated: 12/04/24 1600    Narrative:      Left Wrist X-Ray    Indication: Pain    Views:  AP, Lateral, and Oblique     Comparison:  8/28/24    Findings:  Status post removal of " hardware after left distal radius fracture.    Chronic ulnar styloid fracture.  Normal soft tissues  Normal joint spaces    Impression:   Status post removal hardware.  No evidence of radiocarpal arthritis.    Chronic ulnar styloid fracture visualized.                Procedures:  Procedures    Quality Measures:   ACP:   ACP discussion was deferred.    Tobacco:   Stef Grey  reports that he has never smoked. He has been exposed to tobacco smoke. He has never used smokeless tobacco.    Assessment / Plan    Assessment/Plan:      Diagnosis Plan   1. Post-operative state  XR Wrist 3+ View Left          Patient presents 3 and half month status post removal of hardware of left wrist after comminuted intra-articular left distal radius fracture.  He is doing well postoperatively and has full range of motion use of his left hand without evidence of posttraumatic arthritis or pain.  Recommend follow-up as needed with any future concerns.    Follow Up:   Return if symptoms worsen or fail to improve.        Kevin Medina MD  [facility] Extremity Surgeon

## 2025-06-02 ENCOUNTER — OFFICE VISIT (OUTPATIENT)
Dept: ORTHOPEDIC SURGERY | Facility: CLINIC | Age: 35
End: 2025-06-02
Payer: COMMERCIAL

## 2025-06-02 VITALS — WEIGHT: 299 LBS | BODY MASS INDEX: 48.05 KG/M2 | HEIGHT: 66 IN

## 2025-06-02 DIAGNOSIS — Z87.81 S/P ORIF (OPEN REDUCTION INTERNAL FIXATION) FRACTURE: Primary | ICD-10-CM

## 2025-06-02 DIAGNOSIS — Z98.890 S/P ORIF (OPEN REDUCTION INTERNAL FIXATION) FRACTURE: Primary | ICD-10-CM

## 2025-06-02 NOTE — PROGRESS NOTES
"                          [facility] Follow Up     Office Follow Up Visit     Date: 06/02/2025   Patient Name: Stef Grey  MRN: [id number]  YOB: 1990  Chief Complaint:   Chief Complaint   Patient presents with    Follow-up     1 year recheck- S/P Deep hardware removal right ankle (DOS: 1/4/24), s/p ORIF Right ankle (DOS 10/05/2023)     History of Present Illness:   Stef Grey is a 35 y.o. male who is here today for 1 year follow-up status post syndesmotic ORIF with subsequent hardware removal.  States ankle is doing well.  Has returned to all activities.  No new issues.  Continues to work at [employer].  Happy with how his ankle is doing.  No new complaints.    Subjective   I reviewed the patient's chief complaint, history of present illness, review of systems, past medical history, surgical history, family history, social history, medications and allergy list   Objective    Vital Signs:   Vitals:    06/02/25 1311   Weight: 136 kg (299 lb)   Height: 167.6 cm (65.98\")     Body mass index is 48.28 kg/m².    Ortho Exam:  right LE Foot and Ankle Exam:   Leg compartments soft and compressible.  Incision lateral ankle well-healed..  Able to actively plantarflex and dorsiflex ankle and all toes.  Appropriate swelling for this stage of healing about the foot and ankle.  Toes warm and well-perfused.  Brisk cap refill in all toes.  Ambulated in clinic with no discomfort.  Able to do single limb heel rise on the right with no discomfort or trouble.    Results Review:  XR Ankle 3+ View Right  Right Ankle X-Ray 06/02/25   Indication: Pain  Views: 3 weight bearing , comparison to previous  Findings: xrays reviewed by me today in the office and show postsurgical   changes distal tibia and fibula, ankle mortise congruent well-maintained   with no signs of syndesmotic widening on weightbearing views, normal ankle   joint space          Assessment / Plan    Assessment/Plan: "   Diagnoses and all orders for this visit:    1. S/P ORIF (open reduction internal fixation) fracture (Primary)  -     XR Ankle 3+ View Right      Patient is now about 17 months out from his hardware removal in about 20 to 21 months out from his original injury. Feels like his ankle has made a full recovery. Denies any pain and is very happy with his progress. At this point I will see this patient back in 1 year for repeat x-rays and reevaluation. Happy with his progress. Was a pleasure seeing him today.     Follow Up:  Return in about 1 year (around 6/2/2026) for F/u Recheck with images.      Venancio Blum MD  [facility] Orthopedic Surgeon

## (undated) DEVICE — BIT DRL TI 2.5MM

## (undated) DEVICE — GLV SURG SENSICARE PI MIC PF SZ9 LF STRL

## (undated) DEVICE — DRSNG PAD ABD 8X10IN STRL

## (undated) DEVICE — SPNG GZ WOVN 4X4IN 12PLY 10/BX STRL

## (undated) DEVICE — PK LAP LASR CHOLE 10

## (undated) DEVICE — STRAP POSTN KN/BDY FM 5X72IN DISP

## (undated) DEVICE — TRAP FLD MINIVAC MEGADYNE 100ML

## (undated) DEVICE — UNDERCAST PADDING: Brand: DEROYAL

## (undated) DEVICE — BIT DRL 2.5X110MM

## (undated) DEVICE — DRVR QC UNIV T10

## (undated) DEVICE — 450 ML BOTTLE OF 0.05% CHLORHEXIDINE GLUCONATE IN 99.95% STERILE WATER FOR IRRIGATION, USP AND APPLICATOR.: Brand: IRRISEPT ANTIMICROBIAL WOUND LAVAGE

## (undated) DEVICE — SUT VIC 0 UR6 27IN VCP603H

## (undated) DEVICE — UNDERGLV SURG BIOGEL INDICATOR LF PF 7.5

## (undated) DEVICE — PATIENT RETURN ELECTRODE, SINGLE-USE, CONTACT QUALITY MONITORING, ADULT, WITH 9FT CORD, FOR PATIENTS WEIGING OVER 33LBS. (15KG): Brand: MEGADYNE

## (undated) DEVICE — ENDOPATH XCEL BLADELESS TROCARS WITH STABILITY SLEEVES: Brand: ENDOPATH XCEL

## (undated) DEVICE — BNDG ELAS CO-FLEX SLF ADHR 4IN5YD LF STRL

## (undated) DEVICE — BNDG ELAS ELITE V/CLOSE 4IN 5YD LF STRL

## (undated) DEVICE — PIN FIX BB TAK THRD

## (undated) DEVICE — SUT MNCRYL 3/0 SH 27IN UD MCP416H

## (undated) DEVICE — PK EXTREM UPPR 10

## (undated) DEVICE — ENDOCUT SCISSOR TIP, DISPOSABLE: Brand: RENEW

## (undated) DEVICE — GLV SURG SENSICARE PI ORTHO SZ8 LF STRL

## (undated) DEVICE — PK EXTREM LOWR 10

## (undated) DEVICE — ANTIBACTERIAL UNDYED BRAIDED (POLYGLACTIN 910), SYNTHETIC ABSORBABLE SUTURE: Brand: COATED VICRYL

## (undated) DEVICE — BLANKT WARM UPPR/BDY ARM/OUT 57X196CM

## (undated) DEVICE — APPL CHLORAPREP TINTED 26ML TEAL

## (undated) DEVICE — ELECTRD BLD EZ CLN STD 2.5IN

## (undated) DEVICE — GOWN,PREVENTION PLUS,XXLARGE,STERILE: Brand: MEDLINE

## (undated) DEVICE — BIT DRL SLD SD CUT 2.5X40MM

## (undated) DEVICE — DRP C/ARMOR

## (undated) DEVICE — DRVR AO CONNECT SQ TP 2MM

## (undated) DEVICE — ENDOPATH XCEL UNIVERSAL TROCAR STABLILITY SLEEVES: Brand: ENDOPATH XCEL

## (undated) DEVICE — INTENDED FOR TISSUE SEPARATION, AND OTHER PROCEDURES THAT REQUIRE A SHARP SURGICAL BLADE TO PUNCTURE OR CUT.: Brand: BARD-PARKER ® SAFETYLOCK CARBON RIB-BACK BLADES

## (undated) DEVICE — DRSNG GZ PETROLTM XEROFORM CURAD 1X8IN STRL

## (undated) DEVICE — SNAP KOVER: Brand: UNBRANDED

## (undated) DEVICE — BLANKT WARM LOWR/BDY 100X120CM

## (undated) DEVICE — PAD ARMBRD SURG CONVOL 7.5X20X2IN

## (undated) DEVICE — TBG PENCL TELESCP MEGADYNE SMOKE EVAC 10FT

## (undated) DEVICE — SUT ETHLN 4/0 PS2 18IN 1667H

## (undated) DEVICE — UNDERGLV SURG BIOGEL INDICAT PI SZ8 BLU

## (undated) DEVICE — SUT MNCRYL PLS ANTIB UD 4/0 PS2 18IN

## (undated) DEVICE — GLV SURG SENSICARE PI MIC PF SZ8.5 LF STRL

## (undated) DEVICE — SUT VIC 0 SH 27IN J418H

## (undated) DEVICE — KWIRE STD TP 1.5X127MM
Type: IMPLANTABLE DEVICE | Site: WRIST | Status: NON-FUNCTIONAL
Removed: 2023-10-05

## (undated) DEVICE — [HIGH FLOW INSUFFLATOR,  DO NOT USE IF PACKAGE IS DAMAGED,  KEEP DRY,  KEEP AWAY FROM SUNLIGHT,  PROTECT FROM HEAT AND RADIOACTIVE SOURCES.]: Brand: PNEUMOSURE

## (undated) DEVICE — BIT DRL SLD SD CUT 2.0X40MM

## (undated) DEVICE — LAPAROVUE VISIBILITY SYSTEM LAPAROSCOPIC SOLUTIONS: Brand: LAPAROVUE

## (undated) DEVICE — GLV SURG SENSICARE PI ORTHO SZ7.5 LF STRL

## (undated) DEVICE — GLV SURG SENSICARE PI MIC PF SZ8 LF STRL

## (undated) DEVICE — SUT ETHLN 3/0 PC5 18IN 1893G